# Patient Record
Sex: FEMALE | Race: WHITE | Employment: PART TIME | URBAN - NONMETROPOLITAN AREA
[De-identification: names, ages, dates, MRNs, and addresses within clinical notes are randomized per-mention and may not be internally consistent; named-entity substitution may affect disease eponyms.]

---

## 2017-01-30 ENCOUNTER — OFFICE VISIT (OUTPATIENT)
Dept: BARIATRICS/WEIGHT MGMT | Age: 27
End: 2017-01-30

## 2017-01-30 VITALS
SYSTOLIC BLOOD PRESSURE: 136 MMHG | RESPIRATION RATE: 18 BRPM | DIASTOLIC BLOOD PRESSURE: 84 MMHG | BODY MASS INDEX: 43.4 KG/M2 | HEART RATE: 96 BPM | TEMPERATURE: 99 F | WEIGHT: 293 LBS | HEIGHT: 69 IN

## 2017-01-30 DIAGNOSIS — N97.0 INFERTILITY ASSOCIATED WITH ANOVULATION: ICD-10-CM

## 2017-01-30 DIAGNOSIS — G47.30 SLEEP APNEA, UNSPECIFIED TYPE: ICD-10-CM

## 2017-01-30 DIAGNOSIS — E66.01 MORBID OBESITY DUE TO EXCESS CALORIES (HCC): Primary | ICD-10-CM

## 2017-01-30 DIAGNOSIS — J45.909 UNCOMPLICATED ASTHMA, UNSPECIFIED ASTHMA SEVERITY: ICD-10-CM

## 2017-01-30 DIAGNOSIS — F32.A DEPRESSION, UNSPECIFIED DEPRESSION TYPE: ICD-10-CM

## 2017-01-30 DIAGNOSIS — E11.9 TYPE 2 DIABETES MELLITUS WITHOUT COMPLICATION, UNSPECIFIED LONG TERM INSULIN USE STATUS: ICD-10-CM

## 2017-01-30 DIAGNOSIS — F41.9 ANXIETY: ICD-10-CM

## 2017-01-30 DIAGNOSIS — K21.9 GASTROESOPHAGEAL REFLUX DISEASE, ESOPHAGITIS PRESENCE NOT SPECIFIED: ICD-10-CM

## 2017-01-30 DIAGNOSIS — I10 ESSENTIAL HYPERTENSION: ICD-10-CM

## 2017-01-30 PROCEDURE — 99203 OFFICE O/P NEW LOW 30 MIN: CPT | Performed by: SURGERY

## 2017-01-30 RX ORDER — LABETALOL 100 MG/1
200 TABLET, FILM COATED ORAL 2 TIMES DAILY
Status: ON HOLD | COMMUNITY
Start: 2017-01-29 | End: 2018-03-06 | Stop reason: HOSPADM

## 2017-01-30 RX ORDER — VENLAFAXINE HYDROCHLORIDE 75 MG/1
75 CAPSULE, EXTENDED RELEASE ORAL DAILY
Status: ON HOLD | COMMUNITY
End: 2018-02-14 | Stop reason: HOSPADM

## 2017-01-30 RX ORDER — LEVOTHYROXINE SODIUM 0.07 MG/1
125 TABLET ORAL
COMMUNITY
Start: 2017-01-29 | End: 2018-02-05 | Stop reason: DRUGHIGH

## 2017-01-30 RX ORDER — TRAZODONE HYDROCHLORIDE 100 MG/1
TABLET ORAL
COMMUNITY
Start: 2017-01-29 | End: 2017-03-12 | Stop reason: ALTCHOICE

## 2017-01-30 RX ORDER — OMEPRAZOLE 20 MG/1
20 CAPSULE, DELAYED RELEASE ORAL DAILY
Status: ON HOLD | COMMUNITY
End: 2018-02-14 | Stop reason: HOSPADM

## 2017-01-30 RX ORDER — HYDROCHLOROTHIAZIDE 25 MG/1
TABLET ORAL
COMMUNITY
Start: 2017-01-29 | End: 2017-03-12 | Stop reason: ALTCHOICE

## 2017-01-30 ASSESSMENT — ENCOUNTER SYMPTOMS
EYES NEGATIVE: 1
DIARRHEA: 1
APNEA: 1
SHORTNESS OF BREATH: 0
CONSTIPATION: 1
BACK PAIN: 1
CHEST TIGHTNESS: 0

## 2017-02-22 ENCOUNTER — OFFICE VISIT (OUTPATIENT)
Dept: BARIATRICS/WEIGHT MGMT | Age: 27
End: 2017-02-22

## 2017-02-22 DIAGNOSIS — E66.9 OBESITY, UNSPECIFIED OBESITY SEVERITY, UNSPECIFIED OBESITY TYPE: Primary | ICD-10-CM

## 2017-02-22 DIAGNOSIS — Z13.21 MALNUTRITION SCREEN: Primary | ICD-10-CM

## 2017-02-22 DIAGNOSIS — E66.01 OBESITY, MORBID, BMI 50 OR HIGHER (HCC): ICD-10-CM

## 2017-02-22 DIAGNOSIS — Z01.818 PRE-OP TESTING: ICD-10-CM

## 2017-03-31 ENCOUNTER — OFFICE VISIT (OUTPATIENT)
Dept: BARIATRICS/WEIGHT MGMT | Age: 27
End: 2017-03-31

## 2017-03-31 VITALS
TEMPERATURE: 97.8 F | SYSTOLIC BLOOD PRESSURE: 118 MMHG | DIASTOLIC BLOOD PRESSURE: 78 MMHG | WEIGHT: 293 LBS | HEIGHT: 69 IN | BODY MASS INDEX: 43.4 KG/M2

## 2017-03-31 DIAGNOSIS — E66.01 OBESITY, MORBID, BMI 50 OR HIGHER (HCC): ICD-10-CM

## 2017-03-31 DIAGNOSIS — Z13.21 MALNUTRITION SCREEN: ICD-10-CM

## 2017-03-31 DIAGNOSIS — Z01.818 PRE-OP TESTING: ICD-10-CM

## 2017-03-31 DIAGNOSIS — E66.01 MORBID OBESITY WITH BMI OF 45.0-49.9, ADULT (HCC): Primary | ICD-10-CM

## 2017-03-31 DIAGNOSIS — E11.9 DIABETES MELLITUS TYPE II, NON INSULIN DEPENDENT (HCC): ICD-10-CM

## 2017-03-31 PROCEDURE — 99213 OFFICE O/P EST LOW 20 MIN: CPT | Performed by: PHYSICIAN ASSISTANT

## 2017-04-26 ENCOUNTER — OFFICE VISIT (OUTPATIENT)
Dept: BARIATRICS/WEIGHT MGMT | Age: 27
End: 2017-04-26

## 2017-04-26 VITALS — WEIGHT: 293 LBS | HEIGHT: 69 IN | BODY MASS INDEX: 43.4 KG/M2

## 2017-04-26 DIAGNOSIS — E66.9 OBESITY, UNSPECIFIED OBESITY SEVERITY, UNSPECIFIED OBESITY TYPE: Primary | ICD-10-CM

## 2017-04-27 ENCOUNTER — TELEPHONE (OUTPATIENT)
Age: 27
End: 2017-04-27

## 2017-05-02 ENCOUNTER — TELEPHONE (OUTPATIENT)
Age: 27
End: 2017-05-02

## 2017-05-02 DIAGNOSIS — K21.9 GASTROESOPHAGEAL REFLUX DISEASE, ESOPHAGITIS PRESENCE NOT SPECIFIED: Primary | ICD-10-CM

## 2017-05-02 DIAGNOSIS — E66.01 MORBID OBESITY DUE TO EXCESS CALORIES (HCC): ICD-10-CM

## 2017-05-10 ENCOUNTER — OFFICE VISIT (OUTPATIENT)
Dept: BARIATRICS/WEIGHT MGMT | Age: 27
End: 2017-05-10

## 2017-05-10 VITALS
SYSTOLIC BLOOD PRESSURE: 132 MMHG | TEMPERATURE: 97.8 F | HEIGHT: 69 IN | WEIGHT: 293 LBS | BODY MASS INDEX: 43.4 KG/M2 | HEART RATE: 74 BPM | DIASTOLIC BLOOD PRESSURE: 82 MMHG

## 2017-05-10 DIAGNOSIS — E66.9 OBESITY, UNSPECIFIED OBESITY SEVERITY, UNSPECIFIED OBESITY TYPE: Primary | ICD-10-CM

## 2017-05-10 DIAGNOSIS — E11.9 DIABETES MELLITUS TYPE II, NON INSULIN DEPENDENT (HCC): ICD-10-CM

## 2017-05-10 DIAGNOSIS — E66.01 MORBID OBESITY WITH BMI OF 45.0-49.9, ADULT (HCC): Primary | ICD-10-CM

## 2017-05-10 DIAGNOSIS — K21.9 GASTROESOPHAGEAL REFLUX DISEASE, ESOPHAGITIS PRESENCE NOT SPECIFIED: ICD-10-CM

## 2017-05-10 PROCEDURE — 99213 OFFICE O/P EST LOW 20 MIN: CPT | Performed by: PHYSICIAN ASSISTANT

## 2017-06-12 ENCOUNTER — OFFICE VISIT (OUTPATIENT)
Dept: BARIATRICS/WEIGHT MGMT | Age: 27
End: 2017-06-12

## 2017-06-12 VITALS
BODY MASS INDEX: 43.4 KG/M2 | SYSTOLIC BLOOD PRESSURE: 134 MMHG | HEIGHT: 69 IN | DIASTOLIC BLOOD PRESSURE: 78 MMHG | WEIGHT: 293 LBS | HEART RATE: 76 BPM | TEMPERATURE: 98.2 F

## 2017-06-12 VITALS — HEIGHT: 69 IN | WEIGHT: 293 LBS | BODY MASS INDEX: 43.4 KG/M2

## 2017-06-12 DIAGNOSIS — E66.9 OBESITY, UNSPECIFIED OBESITY SEVERITY, UNSPECIFIED OBESITY TYPE: Primary | ICD-10-CM

## 2017-06-12 DIAGNOSIS — E11.9 DIABETES MELLITUS TYPE II, NON INSULIN DEPENDENT (HCC): ICD-10-CM

## 2017-06-12 DIAGNOSIS — K21.9 GASTROESOPHAGEAL REFLUX DISEASE, ESOPHAGITIS PRESENCE NOT SPECIFIED: ICD-10-CM

## 2017-06-12 DIAGNOSIS — I10 ESSENTIAL HYPERTENSION: ICD-10-CM

## 2017-06-12 DIAGNOSIS — E66.01 MORBID OBESITY WITH BMI OF 45.0-49.9, ADULT (HCC): Primary | ICD-10-CM

## 2017-06-12 PROCEDURE — 99213 OFFICE O/P EST LOW 20 MIN: CPT | Performed by: PHYSICIAN ASSISTANT

## 2017-07-19 ENCOUNTER — HOSPITAL ENCOUNTER (EMERGENCY)
Age: 27
Discharge: HOME | End: 2017-07-20
Attending: FAMILY MEDICINE
Payer: COMMERCIAL

## 2017-07-19 DIAGNOSIS — M25.511 ACUTE PAIN OF RIGHT SHOULDER: Primary | ICD-10-CM

## 2017-07-19 PROCEDURE — 99283 EMERGENCY DEPT VISIT LOW MDM: CPT

## 2017-07-19 ASSESSMENT — PAIN DESCRIPTION - ONSET: ONSET: ON-GOING

## 2017-07-19 ASSESSMENT — PAIN DESCRIPTION - DIRECTION: RADIATING_TOWARDS: DOWN RIGHT ARM

## 2017-07-19 ASSESSMENT — PAIN DESCRIPTION - FREQUENCY: FREQUENCY: CONTINUOUS

## 2017-07-19 ASSESSMENT — PAIN DESCRIPTION - LOCATION: LOCATION: SHOULDER

## 2017-07-19 ASSESSMENT — PAIN DESCRIPTION - ORIENTATION: ORIENTATION: RIGHT

## 2017-07-19 ASSESSMENT — PAIN SCALES - GENERAL: PAINLEVEL_OUTOF10: 10

## 2017-07-19 ASSESSMENT — PAIN DESCRIPTION - PROGRESSION: CLINICAL_PROGRESSION: GRADUALLY WORSENING

## 2017-07-19 ASSESSMENT — PAIN DESCRIPTION - DESCRIPTORS: DESCRIPTORS: ACHING;DULL

## 2017-07-20 ENCOUNTER — APPOINTMENT (OUTPATIENT)
Dept: GENERAL RADIOLOGY | Age: 27
End: 2017-07-20
Payer: COMMERCIAL

## 2017-07-20 VITALS
DIASTOLIC BLOOD PRESSURE: 68 MMHG | SYSTOLIC BLOOD PRESSURE: 132 MMHG | RESPIRATION RATE: 18 BRPM | OXYGEN SATURATION: 100 % | BODY MASS INDEX: 41.95 KG/M2 | WEIGHT: 293 LBS | TEMPERATURE: 98 F | HEART RATE: 70 BPM | HEIGHT: 70 IN

## 2017-07-20 PROCEDURE — 73030 X-RAY EXAM OF SHOULDER: CPT

## 2017-07-20 PROCEDURE — 6370000000 HC RX 637 (ALT 250 FOR IP): Performed by: FAMILY MEDICINE

## 2017-07-20 RX ORDER — IBUPROFEN 800 MG/1
800 TABLET ORAL ONCE
Status: COMPLETED | OUTPATIENT
Start: 2017-07-20 | End: 2017-07-20

## 2017-07-20 RX ORDER — CYCLOBENZAPRINE HCL 10 MG
10 TABLET ORAL 3 TIMES DAILY PRN
Qty: 30 TABLET | Refills: 0 | Status: SHIPPED | OUTPATIENT
Start: 2017-07-20 | End: 2017-07-30

## 2017-07-20 RX ORDER — NAPROXEN 500 MG/1
500 TABLET ORAL 2 TIMES DAILY
Qty: 60 TABLET | Refills: 0 | Status: SHIPPED | OUTPATIENT
Start: 2017-07-20 | End: 2017-08-22

## 2017-07-20 RX ADMIN — IBUPROFEN 800 MG: 800 TABLET, FILM COATED ORAL at 00:34

## 2017-07-20 ASSESSMENT — ENCOUNTER SYMPTOMS
DIARRHEA: 0
BACK PAIN: 0
RHINORRHEA: 0
EYE DISCHARGE: 0
SHORTNESS OF BREATH: 0
EYE PAIN: 0
SORE THROAT: 0
ABDOMINAL PAIN: 0
NAUSEA: 0
COUGH: 0
VOMITING: 0
WHEEZING: 0

## 2017-07-20 ASSESSMENT — PAIN SCALES - GENERAL
PAINLEVEL_OUTOF10: 10
PAINLEVEL_OUTOF10: 10

## 2017-07-20 ASSESSMENT — PAIN DESCRIPTION - ORIENTATION: ORIENTATION: RIGHT

## 2017-07-20 ASSESSMENT — PAIN DESCRIPTION - LOCATION: LOCATION: SHOULDER

## 2017-07-20 ASSESSMENT — PAIN DESCRIPTION - PAIN TYPE: TYPE: CHRONIC PAIN

## 2017-07-24 ENCOUNTER — OFFICE VISIT (OUTPATIENT)
Dept: BARIATRICS/WEIGHT MGMT | Age: 27
End: 2017-07-24

## 2017-07-24 ENCOUNTER — OFFICE VISIT (OUTPATIENT)
Dept: BARIATRICS/WEIGHT MGMT | Age: 27
End: 2017-07-24
Payer: COMMERCIAL

## 2017-07-24 VITALS
RESPIRATION RATE: 18 BRPM | DIASTOLIC BLOOD PRESSURE: 78 MMHG | TEMPERATURE: 98.9 F | BODY MASS INDEX: 43.4 KG/M2 | SYSTOLIC BLOOD PRESSURE: 136 MMHG | HEIGHT: 69 IN | WEIGHT: 293 LBS | HEART RATE: 88 BPM

## 2017-07-24 VITALS — WEIGHT: 293 LBS | BODY MASS INDEX: 43.4 KG/M2 | HEIGHT: 69 IN

## 2017-07-24 DIAGNOSIS — E66.01 MORBID OBESITY WITH BMI OF 50.0-59.9, ADULT (HCC): Primary | ICD-10-CM

## 2017-07-24 DIAGNOSIS — K21.9 GASTROESOPHAGEAL REFLUX DISEASE, ESOPHAGITIS PRESENCE NOT SPECIFIED: ICD-10-CM

## 2017-07-24 DIAGNOSIS — E66.01 MORBID OBESITY, UNSPECIFIED OBESITY TYPE (HCC): Primary | ICD-10-CM

## 2017-07-24 DIAGNOSIS — I10 ESSENTIAL HYPERTENSION: ICD-10-CM

## 2017-07-24 DIAGNOSIS — E11.9 DIABETES MELLITUS TYPE II, NON INSULIN DEPENDENT (HCC): ICD-10-CM

## 2017-07-24 PROCEDURE — 99213 OFFICE O/P EST LOW 20 MIN: CPT | Performed by: PHYSICIAN ASSISTANT

## 2017-08-21 ENCOUNTER — HOSPITAL ENCOUNTER (OUTPATIENT)
Age: 27
Discharge: HOME | End: 2017-08-21
Payer: COMMERCIAL

## 2017-08-21 PROCEDURE — 93005 ELECTROCARDIOGRAM TRACING: CPT

## 2017-08-22 ENCOUNTER — OFFICE VISIT (OUTPATIENT)
Dept: BARIATRICS/WEIGHT MGMT | Age: 27
End: 2017-08-22
Payer: COMMERCIAL

## 2017-08-22 ENCOUNTER — OFFICE VISIT (OUTPATIENT)
Dept: BARIATRICS/WEIGHT MGMT | Age: 27
End: 2017-08-22

## 2017-08-22 VITALS
HEIGHT: 69 IN | SYSTOLIC BLOOD PRESSURE: 126 MMHG | TEMPERATURE: 98.9 F | WEIGHT: 293 LBS | RESPIRATION RATE: 18 BRPM | HEART RATE: 84 BPM | BODY MASS INDEX: 43.4 KG/M2 | DIASTOLIC BLOOD PRESSURE: 80 MMHG

## 2017-08-22 DIAGNOSIS — K21.9 GASTROESOPHAGEAL REFLUX DISEASE, ESOPHAGITIS PRESENCE NOT SPECIFIED: ICD-10-CM

## 2017-08-22 DIAGNOSIS — G25.81 RLS (RESTLESS LEGS SYNDROME): ICD-10-CM

## 2017-08-22 DIAGNOSIS — Z91.89 AT RISK FOR SLEEP APNEA: ICD-10-CM

## 2017-08-22 DIAGNOSIS — E66.01 MORBID OBESITY WITH BMI OF 50.0-59.9, ADULT (HCC): Primary | ICD-10-CM

## 2017-08-22 DIAGNOSIS — E66.01 MORBID OBESITY, UNSPECIFIED OBESITY TYPE (HCC): Primary | ICD-10-CM

## 2017-08-22 DIAGNOSIS — I10 ESSENTIAL HYPERTENSION: ICD-10-CM

## 2017-08-22 DIAGNOSIS — E11.9 DIABETES MELLITUS TYPE II, NON INSULIN DEPENDENT (HCC): ICD-10-CM

## 2017-08-22 LAB
EKG ATRIAL RATE: 70 BPM
EKG P AXIS: 29 DEGREES
EKG P-R INTERVAL: 180 MS
EKG Q-T INTERVAL: 366 MS
EKG QRS DURATION: 88 MS
EKG QTC CALCULATION (BAZETT): 395 MS
EKG R AXIS: 86 DEGREES
EKG T AXIS: 35 DEGREES
EKG VENTRICULAR RATE: 70 BPM

## 2017-08-22 PROCEDURE — 99213 OFFICE O/P EST LOW 20 MIN: CPT | Performed by: PHYSICIAN ASSISTANT

## 2017-09-01 ENCOUNTER — NURSE TRIAGE (OUTPATIENT)
Dept: ADMINISTRATIVE | Age: 27
End: 2017-09-01

## 2017-11-06 ENCOUNTER — OFFICE VISIT (OUTPATIENT)
Dept: BARIATRICS/WEIGHT MGMT | Age: 27
End: 2017-11-06

## 2017-11-06 ENCOUNTER — OFFICE VISIT (OUTPATIENT)
Dept: BARIATRICS/WEIGHT MGMT | Age: 27
End: 2017-11-06
Payer: COMMERCIAL

## 2017-11-06 VITALS
HEART RATE: 96 BPM | TEMPERATURE: 98.4 F | DIASTOLIC BLOOD PRESSURE: 84 MMHG | HEIGHT: 69 IN | BODY MASS INDEX: 43.4 KG/M2 | WEIGHT: 293 LBS | SYSTOLIC BLOOD PRESSURE: 132 MMHG | RESPIRATION RATE: 18 BRPM

## 2017-11-06 DIAGNOSIS — E11.9 DIABETES MELLITUS TYPE II, NON INSULIN DEPENDENT (HCC): ICD-10-CM

## 2017-11-06 DIAGNOSIS — I10 ESSENTIAL HYPERTENSION: ICD-10-CM

## 2017-11-06 DIAGNOSIS — E03.9 HYPOTHYROIDISM, UNSPECIFIED TYPE: ICD-10-CM

## 2017-11-06 DIAGNOSIS — G47.30 SLEEP APNEA, UNSPECIFIED TYPE: ICD-10-CM

## 2017-11-06 DIAGNOSIS — E66.01 MORBID OBESITY WITH BMI OF 50.0-59.9, ADULT (HCC): Primary | ICD-10-CM

## 2017-11-06 DIAGNOSIS — E66.01 MORBID OBESITY DUE TO EXCESS CALORIES (HCC): Primary | ICD-10-CM

## 2017-11-06 DIAGNOSIS — K21.9 GASTROESOPHAGEAL REFLUX DISEASE, ESOPHAGITIS PRESENCE NOT SPECIFIED: ICD-10-CM

## 2017-11-06 PROCEDURE — 3046F HEMOGLOBIN A1C LEVEL >9.0%: CPT | Performed by: PHYSICIAN ASSISTANT

## 2017-11-06 PROCEDURE — G8427 DOCREV CUR MEDS BY ELIG CLIN: HCPCS | Performed by: PHYSICIAN ASSISTANT

## 2017-11-06 PROCEDURE — G8484 FLU IMMUNIZE NO ADMIN: HCPCS | Performed by: PHYSICIAN ASSISTANT

## 2017-11-06 PROCEDURE — 99213 OFFICE O/P EST LOW 20 MIN: CPT | Performed by: PHYSICIAN ASSISTANT

## 2017-11-06 PROCEDURE — 1036F TOBACCO NON-USER: CPT | Performed by: PHYSICIAN ASSISTANT

## 2017-11-06 PROCEDURE — G8417 CALC BMI ABV UP PARAM F/U: HCPCS | Performed by: PHYSICIAN ASSISTANT

## 2017-11-06 NOTE — PATIENT INSTRUCTIONS
Goals: 1. I will increase my physical activity by using stationary bike at home at least two days a week! 2. I will begin using food journal again to record meal times, serving sizes and bring back to next dietitian visit. 3.  Great job on water intake - continue at least 64 oz a day  4. Remember important not to skip meals!

## 2017-11-06 NOTE — PROGRESS NOTES
Occupational 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     Social History Main Topics    Smoking status: Former Smoker     Packs/day: 0.50     Types: Cigarettes     Quit date: 5/18/2014    Smokeless tobacco: Never Used    Alcohol use No    Drug use: No    Sexual activity: Yes     Partners: Male     Other Topics Concern    Not on file     Social History Narrative    No narrative on file        Medications:   Current Outpatient Prescriptions   Medication Sig Dispense Refill    Norgestimate-Eth Estradiol (SPRINTEC 28 PO) Take by mouth      metFORMIN (GLUCOPHAGE) 500 MG tablet Take 500 mg by mouth 4 times daily 2 tablets in the morning, then 2 tablets at night      Calcium Carbonate-Vitamin D (OS-ADIN 500 + D PO) Take by mouth 2 times daily      omeprazole (PRILOSEC) 20 MG delayed release capsule Take 20 mg by mouth daily      venlafaxine (EFFEXOR XR) 75 MG extended release capsule Take 75 mg by mouth daily      levothyroxine (SYNTHROID) 75 MCG tablet 100 mcg       labetalol (NORMODYNE) 100 MG tablet 1.5 pills in morning, then one pill at night.  Albuterol Sulfate (PROVENTIL HFA IN) Inhale into the lungs      EPINEPHrine HCl, Anaphylaxis, (EPIPEN IM) Inject into the muscle       No current facility-administered medications for this visit. Allergies: Allergies   Allergen Reactions    Bee Venom Swelling    Bactrim [Sulfamethoxazole-Trimethoprim]     Mirapex [Pramipexole] Other (See Comments)     Leg cramps    Requip [Ropinirole] Nausea And Vomiting       Subjective:    Review of Systems:  Constitutional: Denies any fever, chills. (+) fatigue. Wound: Denies any rash, skin color changes or wound problems. Resp: Denies any cough, shortness of breath. CV: Denies any chest pain, orthopnea or syncope. GI: Denies any nausea, vomiting. intermittent diarrhea and constipation a/w IBS   : Denies any hematuria, hesitancy or dysuria.      Objective:      /84 (Site: Right Arm, Position: Sitting, Cuff Size: Large Adult)   Pulse 96   Temp 98.4 °F (36.9 °C) (Oral)   Resp 18   Ht 5' 9\" (1.753 m)   Wt (!) 341 lb 6.4 oz (154.9 kg)   BMI 50.42 kg/m²   Physical Examination:   Constitutional: Alert and oriented to person, place and time. Well-developed, well- nourished. Head: Normocephalic and atraumatic  Neck: Supple. Eyes: EOMI b/l. Conjunctivae normal.  No scleral icterus. Respiratory: Effort normal. No respiratory distress. Abd: Benign  Ext:  Movement x 4. No edema  Skin; Warm and dry, no visible rashes, lesions or ulcers. Neuro: Cranial Nerves Grossly Intact; nml coordination  Assessment:      1. Morbid obesity with BMI of 50.0-59.9, adult ([facility] [address]     2. Gastroesophageal reflux disease, esophagitis presence not specified     3. Diabetes mellitus type II, non insulin dependent ([facility] [address]     4. Essential hypertension     5. Sleep apnea, unspecified type     6. Hypothyroidism, unspecified type         Plan:    · Behavior modification discussed in detail in regards to dietary habits. · Nutritional education occurred during visit. Continue following recommendations per dietician  · Improvement in fitness/exercise discussed with patient and the need for this with/without surgery. · Completed orientation with Stuart Segovia, Exercise Physiologist, at the fitness center. · Encouraged to get back on track with dedicated physical activity and proper nutrition  · Completed EGD- wnl/ H. Pylori negative   · Encouraged support groups  · Has attended 3 support groups. · Completed sleep study- mild YOANDY- advised dental device. · Psych eval completed  · LOMN recieved  Return in about 1 month (around 12/6/2017) for Follow up. I spent over 15 minutes with the patient, with greater that 50% of that time spent on education, counseling and coordination of care.      Electronically signed by NYDIA Matta on 11/6/2017 at 1:38 PM

## 2017-11-06 NOTE — PROGRESS NOTES
food.   Snacks: limited snacking. Main Beverages: has reduced tea with splenda, and states water intake has increased and hitting 64 oz per day and greater now  -Impression of Dietary Intake: skipping meals, increased fast food. - Pt  is not  avoiding high fat/high sugar foods. Pt  is not  including protein at meals and snacks. Exercise:  Patient has attended Fitness Orientation  -Physical Activity is:  Does have stationary bike at home and is open to start using this. Little exercise over last couple months       Nutrition Diagnosis: Overweight/Obesity related to currently undergoing MNT as evidenced by BMI of 50.4    Intervention:   Patient has attended support group in the past and encouraged patient to start this again. Pt has been less focused on nutrition behavior changes over last couple months and recognizes this. Reminded patient post surgery implementing non food coping mechanisms in times of stress will be important to keep with weight loss efforts. New goals set with patient including importance of not skipping meals. Handouts given: Food Journal    Patient Instructions   Goals:  1. I will increase my physical activity by using stationary bike at home at least two days a week! 2. I will begin using food journal again to record meal times, serving sizes and bring back to next dietitian visit. 3.  Great job on water intake - continue at least 64 oz a day  4. Remember important not to skip meals!       -Followup visit: 4 weeks with dietitian.      Total time involved in direct patient education: 30 minutes    aMris Coughlin RD, LD  Dietitian- [facility]

## 2017-12-04 ENCOUNTER — OFFICE VISIT (OUTPATIENT)
Dept: BARIATRICS/WEIGHT MGMT | Age: 27
End: 2017-12-04

## 2017-12-04 ENCOUNTER — OFFICE VISIT (OUTPATIENT)
Dept: BARIATRICS/WEIGHT MGMT | Age: 27
End: 2017-12-04
Payer: COMMERCIAL

## 2017-12-04 VITALS
WEIGHT: 293 LBS | BODY MASS INDEX: 43.4 KG/M2 | RESPIRATION RATE: 18 BRPM | HEART RATE: 80 BPM | SYSTOLIC BLOOD PRESSURE: 124 MMHG | HEIGHT: 69 IN | TEMPERATURE: 98.3 F | DIASTOLIC BLOOD PRESSURE: 82 MMHG

## 2017-12-04 DIAGNOSIS — I10 ESSENTIAL HYPERTENSION: ICD-10-CM

## 2017-12-04 DIAGNOSIS — K21.9 GASTROESOPHAGEAL REFLUX DISEASE, ESOPHAGITIS PRESENCE NOT SPECIFIED: ICD-10-CM

## 2017-12-04 DIAGNOSIS — E03.9 HYPOTHYROIDISM, UNSPECIFIED TYPE: ICD-10-CM

## 2017-12-04 DIAGNOSIS — E66.01 MORBID OBESITY DUE TO EXCESS CALORIES (HCC): Primary | ICD-10-CM

## 2017-12-04 DIAGNOSIS — E66.01 MORBID OBESITY WITH BMI OF 50.0-59.9, ADULT (HCC): Primary | ICD-10-CM

## 2017-12-04 DIAGNOSIS — G47.30 SLEEP APNEA, UNSPECIFIED TYPE: ICD-10-CM

## 2017-12-04 DIAGNOSIS — E11.9 DIABETES MELLITUS TYPE II, NON INSULIN DEPENDENT (HCC): ICD-10-CM

## 2017-12-04 PROCEDURE — G8417 CALC BMI ABV UP PARAM F/U: HCPCS | Performed by: PHYSICIAN ASSISTANT

## 2017-12-04 PROCEDURE — G8427 DOCREV CUR MEDS BY ELIG CLIN: HCPCS | Performed by: PHYSICIAN ASSISTANT

## 2017-12-04 PROCEDURE — G8484 FLU IMMUNIZE NO ADMIN: HCPCS | Performed by: PHYSICIAN ASSISTANT

## 2017-12-04 PROCEDURE — 99213 OFFICE O/P EST LOW 20 MIN: CPT | Performed by: PHYSICIAN ASSISTANT

## 2017-12-04 PROCEDURE — 3046F HEMOGLOBIN A1C LEVEL >9.0%: CPT | Performed by: PHYSICIAN ASSISTANT

## 2017-12-04 PROCEDURE — 1036F TOBACCO NON-USER: CPT | Performed by: PHYSICIAN ASSISTANT

## 2017-12-04 NOTE — PROGRESS NOTES
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[employer]     Social History Main Topics    Smoking status: Former Smoker     Packs/day: 0.50     Types: Cigarettes     Quit date: 5/18/2014    Smokeless tobacco: Never Used    Alcohol use No    Drug use: No    Sexual activity: Yes     Partners: Male     Other Topics Concern    Not on file     Social History Narrative    No narrative on file        Medications:   Current Outpatient Prescriptions   Medication Sig Dispense Refill    aspirin 81 MG tablet Take 81 mg by mouth daily      Norgestimate-Eth Estradiol (SPRINTEC 28 PO) Take by mouth      metFORMIN (GLUCOPHAGE) 500 MG tablet Take 500 mg by mouth 4 times daily 2 tablets in the morning, then 2 tablets at night      Calcium Carbonate-Vitamin D (OS-ADIN 500 + D PO) Take by mouth 2 times daily      omeprazole (PRILOSEC) 20 MG delayed release capsule Take 20 mg by mouth daily      venlafaxine (EFFEXOR XR) 75 MG extended release capsule Take 75 mg by mouth daily      levothyroxine (SYNTHROID) 75 MCG tablet 125 mcg       labetalol (NORMODYNE) 100 MG tablet 200 mg 2 times daily 1.5 pills in morning, then one pill at night.  Albuterol Sulfate (PROVENTIL HFA IN) Inhale into the lungs      EPINEPHrine HCl, Anaphylaxis, (EPIPEN IM) Inject into the muscle       No current facility-administered medications for this visit. Allergies: Allergies   Allergen Reactions    Bee Venom Swelling    Bactrim [Sulfamethoxazole-Trimethoprim]     Mirapex [Pramipexole] Other (See Comments)     Leg cramps    Requip [Ropinirole] Nausea And Vomiting       Subjective:    Review of Systems:  Constitutional: Denies any fever, chills. (+) fatigue. Wound: Denies any rash, skin color changes or wound problems. Resp: Denies any cough, shortness of breath. CV: Denies any chest pain, orthopnea or syncope. GI: Denies any nausea, vomiting. intermittent diarrhea and constipation a/w IBS   : Denies any hematuria, hesitancy or dysuria.

## 2017-12-04 NOTE — PROGRESS NOTES
bottles or greater of water  -Impression of Dietary Intake: irregular meal times, skips meals. - Pt  is not consistently avoiding high fat/high sugar foods. Pt  is not consistently including protein at meals and snacks. Exercise:  Patient has attended Fitness Orientation  -Physical Activity is:  Does have stationary bike at home and this month states \"tries\" to use it ~ 2-3 times a week       Nutrition Diagnosis: Overweight/Obesity related to currently undergoing MNT as evidenced by BMI of 50.7    Intervention:   Patient has attended support group in past and strongly encouraged to begin attending again for reinforcement of lifestyle change. Has had some difficulty with consistently following nutrition behavior changes recommended for weight loss success post op. Reminded patient importance of not skipping meals and choosing protein foods first.  Reviwed list of foods to have on hand following surgery. Strategies discussed with split working shift and not going longer than 4-6 waking hours between meals. Suggested bringing protein shake to work. Benefits of regular physical activity reviewed. -  Patient Instructions   Goals:  1. Next support group is Tuesday December 12th at 5:30pm  2. Consistent meal times is important after surgery- can NOT skip meals. Try not to go longer than 4-6 waking hours between meals and remember order of how you want to eat- choose lean protein foods first, followed by vegetables and fruit, then starch food last if still hungry  3. I will aim for regular physical activity- at least three days a week on exercise bike.        -Followup visit: 4 weeks with dietitian and with Dr Justin Bustos next month for re-evaluation following completion of 9 months    Total time involved in direct patient education: 30 minutes    Alex Weldon, JAIRO, LD  Dietitian- [facility]

## 2017-12-04 NOTE — PATIENT INSTRUCTIONS
Goals: 1. Next support group is Tuesday December 12th at 5:30pm  2. Consistent meal times is important after surgery- can NOT skip meals. Try not to go longer than 4-6 waking hours between meals and remember order of how you want to eat- choose lean protein foods first, followed by vegetables and fruit, then starch food last if still hungry  3. I will aim for regular physical activity- at least three days a week on exercise bike.

## 2018-01-09 ENCOUNTER — OFFICE VISIT (OUTPATIENT)
Dept: BARIATRICS/WEIGHT MGMT | Age: 28
End: 2018-01-09

## 2018-01-09 VITALS — BODY MASS INDEX: 43.4 KG/M2 | HEIGHT: 69 IN | WEIGHT: 293 LBS

## 2018-01-09 DIAGNOSIS — E66.01 MORBID OBESITY DUE TO EXCESS CALORIES (HCC): Primary | ICD-10-CM

## 2018-01-09 NOTE — PROGRESS NOTES
of Dietary Intake: on average, 3 meals per day. - Pt  is   avoiding high fat/high sugar foods. Pt  is   including protein at meals and snacks. Exercise:  Patient has attended Fitness Orientation  -Physical Activity is:  Using exercise bike at homefor ~ 15-20 minutes    Frequency of Activity:  2-3 times a week         Nutrition Diagnosis: Overweight/Obesity related to currently undergoing MNT as evidenced by  Body mass index is 51.1 kg/m². .    Intervention:   Healthy behaviors: increase physical activity, not skipping meals and adequate fluid intake  Patient has attended support group times two. Pt advised to eliminate all carbonated beverages. Reviewed out of pocket cost for bariatric vitamins and protein powder. Samples of vitamins given to patient. Questions answered. Handouts given: Support Group and Nutrition Class Schedule, Bariatric Vitamins/Protein Cost and Recommendations and Vitamin Samples, Monthly Recipe    -  Patient Instructions   Goals:  1. Have office note [facility]ed [facility] from your pulmonologist in [facility] that you are cleared for bariatric surgery. 2.  Remember you will need to separate you liquids from solids after surgery. No liquids 30 minutes before your meals and no liquids 30 minutes after your meals. 3. You will need to take your time with meals after surgery and begin practicing this now - chew foods really well and take 20-30 minutes at each meal.  4.  Aim for consistent and set meal times- choose lean protein foods first, followed by vegetables and fruit, then starch food last if still hungry. Consistency is key following bariatric surgery. 5.  Continue regular physical activity- recommend stay as active as you can leading up to surgery and after surgery this will remain important for your weight loss efforts           -Followup visit: *completed 9 months of medical supervision with RD. Meeting with Dr Charlyn Bumpers this Friday.   To attend pre op teaching class upon insurance approval.     Total time involved in direct patient education: 30 minutes    Mercedes Mckeon RD, LD  Dietitian- [facility]

## 2018-01-11 DIAGNOSIS — K21.9 GASTROESOPHAGEAL REFLUX DISEASE WITHOUT ESOPHAGITIS: ICD-10-CM

## 2018-01-11 DIAGNOSIS — E66.01 MORBID OBESITY WITH BMI OF 45.0-49.9, ADULT (HCC): Primary | ICD-10-CM

## 2018-01-11 DIAGNOSIS — I10 HYPERTENSION, UNSPECIFIED TYPE: ICD-10-CM

## 2018-01-11 DIAGNOSIS — E11.8 TYPE 2 DIABETES MELLITUS WITH COMPLICATION, WITHOUT LONG-TERM CURRENT USE OF INSULIN (HCC): ICD-10-CM

## 2018-01-12 ENCOUNTER — OFFICE VISIT (OUTPATIENT)
Dept: BARIATRICS/WEIGHT MGMT | Age: 28
End: 2018-01-12
Payer: COMMERCIAL

## 2018-01-12 ENCOUNTER — HOSPITAL ENCOUNTER (OUTPATIENT)
Dept: GENERAL RADIOLOGY | Age: 28
Discharge: HOME | End: 2018-01-12
Payer: COMMERCIAL

## 2018-01-12 ENCOUNTER — HOSPITAL ENCOUNTER (OUTPATIENT)
Age: 28
Discharge: HOME | End: 2018-01-12
Payer: COMMERCIAL

## 2018-01-12 VITALS
DIASTOLIC BLOOD PRESSURE: 82 MMHG | HEART RATE: 88 BPM | HEIGHT: 69 IN | BODY MASS INDEX: 43.4 KG/M2 | SYSTOLIC BLOOD PRESSURE: 118 MMHG | RESPIRATION RATE: 18 BRPM | WEIGHT: 293 LBS | TEMPERATURE: 97.8 F

## 2018-01-12 DIAGNOSIS — I10 HYPERTENSION, UNSPECIFIED TYPE: ICD-10-CM

## 2018-01-12 DIAGNOSIS — J45.909 UNCOMPLICATED ASTHMA, UNSPECIFIED ASTHMA SEVERITY, UNSPECIFIED WHETHER PERSISTENT: ICD-10-CM

## 2018-01-12 DIAGNOSIS — E11.8 TYPE 2 DIABETES MELLITUS WITH COMPLICATION, WITHOUT LONG-TERM CURRENT USE OF INSULIN (HCC): ICD-10-CM

## 2018-01-12 DIAGNOSIS — K21.9 GASTROESOPHAGEAL REFLUX DISEASE WITHOUT ESOPHAGITIS: ICD-10-CM

## 2018-01-12 DIAGNOSIS — G47.30 SLEEP APNEA, UNSPECIFIED TYPE: ICD-10-CM

## 2018-01-12 DIAGNOSIS — F32.A DEPRESSION, UNSPECIFIED DEPRESSION TYPE: ICD-10-CM

## 2018-01-12 DIAGNOSIS — N97.0 INFERTILITY ASSOCIATED WITH ANOVULATION: ICD-10-CM

## 2018-01-12 DIAGNOSIS — E66.01 MORBID OBESITY WITH BMI OF 45.0-49.9, ADULT (HCC): ICD-10-CM

## 2018-01-12 DIAGNOSIS — G89.29 CHRONIC LOW BACK PAIN WITHOUT SCIATICA, UNSPECIFIED BACK PAIN LATERALITY: ICD-10-CM

## 2018-01-12 DIAGNOSIS — E66.01 MORBID OBESITY WITH BMI OF 45.0-49.9, ADULT (HCC): Primary | ICD-10-CM

## 2018-01-12 DIAGNOSIS — G25.81 RLS (RESTLESS LEGS SYNDROME): ICD-10-CM

## 2018-01-12 DIAGNOSIS — F41.9 ANXIETY: ICD-10-CM

## 2018-01-12 DIAGNOSIS — M54.50 CHRONIC LOW BACK PAIN WITHOUT SCIATICA, UNSPECIFIED BACK PAIN LATERALITY: ICD-10-CM

## 2018-01-12 DIAGNOSIS — E03.9 HYPOTHYROIDISM, UNSPECIFIED TYPE: ICD-10-CM

## 2018-01-12 LAB
ANION GAP SERPL CALCULATED.3IONS-SCNC: 17 MEQ/L (ref 8–16)
BASOPHILS # BLD: 0.9 %
BASOPHILS ABSOLUTE: 0.1 THOU/MM3 (ref 0–0.1)
BUN BLDV-MCNC: 9 MG/DL (ref 7–22)
CALCIUM SERPL-MCNC: 8.9 MG/DL (ref 8.5–10.5)
CHLORIDE BLD-SCNC: 101 MEQ/L (ref 98–111)
CO2: 24 MEQ/L (ref 23–33)
CREAT SERPL-MCNC: 0.7 MG/DL (ref 0.4–1.2)
EOSINOPHIL # BLD: 2.4 %
EOSINOPHILS ABSOLUTE: 0.2 THOU/MM3 (ref 0–0.4)
GFR SERPL CREATININE-BSD FRML MDRD: > 90 ML/MIN/1.73M2
GLUCOSE BLD-MCNC: 134 MG/DL (ref 70–108)
HCT VFR BLD CALC: 38 % (ref 37–47)
HEMOGLOBIN: 12.8 GM/DL (ref 12–16)
LYMPHOCYTES # BLD: 18.6 %
LYMPHOCYTES ABSOLUTE: 1.4 THOU/MM3 (ref 1–4.8)
MCH RBC QN AUTO: 31 PG (ref 27–31)
MCHC RBC AUTO-ENTMCNC: 33.7 GM/DL (ref 33–37)
MCV RBC AUTO: 92.1 FL (ref 81–99)
MONOCYTES # BLD: 9.9 %
MONOCYTES ABSOLUTE: 0.7 THOU/MM3 (ref 0.4–1.3)
NUCLEATED RED BLOOD CELLS: 0 /100 WBC
PDW BLD-RTO: 13.8 % (ref 11.5–14.5)
PLATELET # BLD: 192 THOU/MM3 (ref 130–400)
PMV BLD AUTO: 11.2 MCM (ref 7.4–10.4)
POTASSIUM SERPL-SCNC: 4.1 MEQ/L (ref 3.5–5.2)
RBC # BLD: 4.13 MILL/MM3 (ref 4.2–5.4)
SEG NEUTROPHILS: 68.2 %
SEGMENTED NEUTROPHILS ABSOLUTE COUNT: 5 THOU/MM3 (ref 1.8–7.7)
SODIUM BLD-SCNC: 142 MEQ/L (ref 135–145)
WBC # BLD: 7.3 THOU/MM3 (ref 4.8–10.8)

## 2018-01-12 PROCEDURE — G8484 FLU IMMUNIZE NO ADMIN: HCPCS | Performed by: SURGERY

## 2018-01-12 PROCEDURE — 3046F HEMOGLOBIN A1C LEVEL >9.0%: CPT | Performed by: SURGERY

## 2018-01-12 PROCEDURE — 80048 BASIC METABOLIC PNL TOTAL CA: CPT

## 2018-01-12 PROCEDURE — 1036F TOBACCO NON-USER: CPT | Performed by: SURGERY

## 2018-01-12 PROCEDURE — G8417 CALC BMI ABV UP PARAM F/U: HCPCS | Performed by: SURGERY

## 2018-01-12 PROCEDURE — 71046 X-RAY EXAM CHEST 2 VIEWS: CPT

## 2018-01-12 PROCEDURE — 85025 COMPLETE CBC W/AUTO DIFF WBC: CPT

## 2018-01-12 PROCEDURE — 99213 OFFICE O/P EST LOW 20 MIN: CPT | Performed by: SURGERY

## 2018-01-12 PROCEDURE — 36415 COLL VENOUS BLD VENIPUNCTURE: CPT

## 2018-01-12 PROCEDURE — G8427 DOCREV CUR MEDS BY ELIG CLIN: HCPCS | Performed by: SURGERY

## 2018-01-12 ASSESSMENT — ENCOUNTER SYMPTOMS
APNEA: 1
ALLERGIC/IMMUNOLOGIC NEGATIVE: 1
WHEEZING: 1
GASTROINTESTINAL NEGATIVE: 1
EYES NEGATIVE: 1

## 2018-01-12 NOTE — PROGRESS NOTES
the EG  junction was obtained and I could not definitively see a hiatal hernia. The  scope was straightened was brought back up to the EG junction, which was 38 cm  from the incisors. The scope was withdrawn through the length of the  esophagus that was normal.  The patient tolerated the procedure well.     Liana Morrell M.D. Patient Active Problem List   Diagnosis    Morbid obesity with BMI of 45.0-49.9, adult ([facility] [address]    Morbid obesity due to excess calories ([address]    Diabetes mellitus ([address]    Hypertension    GERD (gastroesophageal reflux disease)     Assessment:   1. Morbid obesity (BMI 50)  2. Diabetes mellitus  3. Sleep apnea  4. Hypertension  5. Asthma  6. GERD  7. Infertility  8. Depression  9. Anxiety    Plan:   1. Discussion again about the pros and cons of weight loss surgery. The risks benefits and alternatives to laparoscopic adjustable band, gastric sleeve and gastric Coby-en-Y bypass were discussed in detail. The pros and cons of robotic assisted, laparoscopic and open techniques were discussed. 2.  Behavior modification discussed in regards to dietary habits. 3.  Nutritional education occurred during visit. Follow-up with dietitian for further evaluation - follow recommendations as directed. 4.  Options for medical management of morbid obesity discussed. 5.  Improvement in fitness/exercise discussed with patient and the need for this with/without surgery. 6.  Medical necessity letter from PCP. 7.  Follow-up in one month at weight management program at [address] [place] [address]. 8.  Signs and symptoms reviewed with patient that would be concerning and need her to return to office for re-evaluation. Patient states she will call if she has questions or concerns. 9. Multivitamin  10. Psychology evaluation completed. Follow-up as needed. 11. EGD completed. Results reviewed. All questions answered. 12.  Encouraged support groups  13.   The pros and cons along with associated risks/complications/side effects of weight loss medications discussed again.   65. Send LOMN    --Patient states that she has not been able to lose enough adequate excess body weight with medical management only and would like to proceed with a sleeve gastrectomy/gastric bypass     -- More than 15 minutes spent with patient today. Greater than 50% of the time was involved with education, counseling and correlating care.

## 2018-01-17 ENCOUNTER — APPOINTMENT (OUTPATIENT)
Dept: GENERAL RADIOLOGY | Age: 28
End: 2018-01-17
Payer: COMMERCIAL

## 2018-01-17 ENCOUNTER — HOSPITAL ENCOUNTER (EMERGENCY)
Age: 28
Discharge: HOME | End: 2018-01-17
Attending: FAMILY MEDICINE
Payer: COMMERCIAL

## 2018-01-17 VITALS
WEIGHT: 293 LBS | DIASTOLIC BLOOD PRESSURE: 95 MMHG | TEMPERATURE: 98.1 F | HEART RATE: 96 BPM | HEIGHT: 70 IN | BODY MASS INDEX: 41.95 KG/M2 | SYSTOLIC BLOOD PRESSURE: 152 MMHG | RESPIRATION RATE: 16 BRPM | OXYGEN SATURATION: 96 %

## 2018-01-17 DIAGNOSIS — J45.21 MILD INTERMITTENT ASTHMA WITH EXACERBATION: Primary | ICD-10-CM

## 2018-01-17 LAB
FLU A ANTIGEN: NEGATIVE
FLU B ANTIGEN: NEGATIVE

## 2018-01-17 PROCEDURE — 87804 INFLUENZA ASSAY W/OPTIC: CPT

## 2018-01-17 PROCEDURE — 6370000000 HC RX 637 (ALT 250 FOR IP): Performed by: FAMILY MEDICINE

## 2018-01-17 PROCEDURE — 6360000002 HC RX W HCPCS

## 2018-01-17 PROCEDURE — 99285 EMERGENCY DEPT VISIT HI MDM: CPT

## 2018-01-17 PROCEDURE — 71046 X-RAY EXAM CHEST 2 VIEWS: CPT

## 2018-01-17 RX ORDER — OSELTAMIVIR PHOSPHATE 75 MG/1
75 CAPSULE ORAL ONCE
Status: COMPLETED | OUTPATIENT
Start: 2018-01-17 | End: 2018-01-17

## 2018-01-17 RX ORDER — OSELTAMIVIR PHOSPHATE 75 MG/1
75 CAPSULE ORAL 2 TIMES DAILY
Qty: 10 CAPSULE | Refills: 0 | Status: SHIPPED | OUTPATIENT
Start: 2018-01-17 | End: 2018-01-22

## 2018-01-17 RX ORDER — ALBUTEROL SULFATE 2.5 MG/3ML
SOLUTION RESPIRATORY (INHALATION)
Status: COMPLETED
Start: 2018-01-17 | End: 2018-01-17

## 2018-01-17 RX ORDER — IPRATROPIUM BROMIDE AND ALBUTEROL SULFATE 2.5; .5 MG/3ML; MG/3ML
1 SOLUTION RESPIRATORY (INHALATION) ONCE
Status: COMPLETED | OUTPATIENT
Start: 2018-01-17 | End: 2018-01-17

## 2018-01-17 RX ADMIN — ALBUTEROL SULFATE 2.5 MG: 2.5 SOLUTION RESPIRATORY (INHALATION) at 18:23

## 2018-01-17 RX ADMIN — OSELTAMIVIR PHOSPHATE 75 MG: 75 CAPSULE ORAL at 19:01

## 2018-01-17 RX ADMIN — IPRATROPIUM BROMIDE AND ALBUTEROL SULFATE 1 AMPULE: .5; 3 SOLUTION RESPIRATORY (INHALATION) at 17:49

## 2018-01-17 ASSESSMENT — PAIN SCALES - GENERAL: PAINLEVEL_OUTOF10: 4

## 2018-01-17 ASSESSMENT — PAIN DESCRIPTION - DESCRIPTORS: DESCRIPTORS: ACHING

## 2018-01-17 ASSESSMENT — PAIN DESCRIPTION - LOCATION: LOCATION: THROAT;CHEST

## 2018-01-17 ASSESSMENT — PAIN DESCRIPTION - PAIN TYPE: TYPE: ACUTE PAIN

## 2018-01-17 ASSESSMENT — PAIN DESCRIPTION - FREQUENCY: FREQUENCY: CONTINUOUS

## 2018-01-18 ASSESSMENT — ENCOUNTER SYMPTOMS
COUGH: 1
CHEST TIGHTNESS: 0
CONSTIPATION: 0
COLOR CHANGE: 0
NAUSEA: 0
DIARRHEA: 0
EYE PAIN: 0
FACIAL SWELLING: 0
BACK PAIN: 0
EYE REDNESS: 0
EYE DISCHARGE: 0
WHEEZING: 1
ABDOMINAL PAIN: 0
PHOTOPHOBIA: 0
VOMITING: 0
SORE THROAT: 1
RHINORRHEA: 0
SHORTNESS OF BREATH: 0
STRIDOR: 0
VOICE CHANGE: 0
ABDOMINAL DISTENTION: 0

## 2018-01-18 NOTE — ED PROVIDER NOTES
[address] [employer]       Chief Complaint   Patient presents with    Cough    Pharyngitis       Nurses Notes reviewed and I agree except as noted in the HPI. HISTORY OF PRESENT ILLNESS    Holger Reece is a 32 y.o. female who presents with worsening cough,mild sore throat. Patient notes wheezing. Patient only recently seen by PCP and started on prednisone. Patient continues to have wheezing. Admits to intermittent fevers. Denies nausea, vomiting. REVIEW OF SYSTEMS     Review of Systems   Constitutional: Positive for activity change and fever. Negative for appetite change, chills and diaphoresis. HENT: Positive for congestion and sore throat. Negative for dental problem, ear pain, facial swelling, rhinorrhea, tinnitus and voice change. Eyes: Negative for photophobia, pain, discharge and redness. Respiratory: Positive for cough and wheezing. Negative for chest tightness, shortness of breath and stridor. Cardiovascular: Negative for chest pain, palpitations and leg swelling. Gastrointestinal: Negative for abdominal distention, abdominal pain, constipation, diarrhea, nausea and vomiting. Genitourinary: Negative for difficulty urinating, dysuria, flank pain, genital sores, pelvic pain, urgency, vaginal bleeding and vaginal discharge. Musculoskeletal: Negative for arthralgias, back pain, joint swelling, myalgias and neck stiffness. Skin: Negative for color change and rash. Neurological: Negative for dizziness, tremors, seizures, syncope, weakness, numbness and headaches. Psychiatric/Behavioral: Negative for agitation, hallucinations, sleep disturbance and suicidal ideas. The patient is not nervous/anxious. All other systems reviewed and are negative. PAST MEDICAL HISTORY    has a past medical history of Anxiety;  Asthma; Chronic back pain; Depression; Diabetes mellitus ([facility] [address] GERD (gastroesophageal reflux disease); GERD (gastroesophageal reflux disease); Headache; Hypertension; Infertility, female; Ulcer ([address] and UTI (urinary tract infection). SURGICAL HISTORY      has a past surgical history that includes Breast surgery; Colonoscopy; and Upper gastrointestinal endoscopy. CURRENT MEDICATIONS       Discharge Medication List as of 2018  7:01 PM      CONTINUE these medications which have NOT CHANGED    Details   aspirin 81 MG tablet Take 81 mg by mouth dailyHistorical Med      Norgestimate-Eth Estradiol (SPRINTEC 28 PO) Take by mouthHistorical Med      metFORMIN (GLUCOPHAGE) 500 MG tablet Take 500 mg by mouth 4 times daily 2 tablets in the morning, then 2 tablets at nightHistorical Med      Calcium Carbonate-Vitamin D (OS-ADIN 500 + D PO) Take by mouth 2 times daily      omeprazole (PRILOSEC) 20 MG delayed release capsule Take 20 mg by mouth daily      venlafaxine (EFFEXOR XR) 75 MG extended release capsule Take 75 mg by mouth daily      levothyroxine (SYNTHROID) 75 MCG tablet 125 mcg Historical Med      labetalol (NORMODYNE) 100 MG tablet 200 mg 2 times daily 1.5 pills in morning, then one pill at night. Historical Med      Albuterol Sulfate (PROVENTIL HFA IN) Inhale into the lungs      EPINEPHrine HCl, Anaphylaxis, (EPIPEN IM) Inject into the muscle             ALLERGIES     is allergic to bee venom; bactrim [sulfamethoxazole-trimethoprim]; mirapex [pramipexole]; and requip [ropinirole]. FAMILY HISTORY     indicated that her mother is alive. She indicated that her father is alive. She indicated that her brother is alive. She indicated that her maternal grandmother is . She indicated that her maternal grandfather is . She indicated that her paternal grandmother is . She indicated that her paternal grandfather is .     family history includes Birth Defects in her mother; Diabetes in her father and maternal grandmother; Heart Disease in her maternal grandfather; High Blood Pressure in her brother and father; Obesity in her brother, father, and mother. SOCIAL HISTORY      reports that she quit smoking about 3 years ago. Her smoking use included Cigarettes. She smoked 0.50 packs per day. She has never used smokeless tobacco. She reports that she does not drink alcohol or use drugs. PHYSICAL EXAM     INITIAL VITALS:  height is 5' 9.5\" (1.765 m) and weight is 342 lb (155.1 kg) (abnormal). Her oral temperature is 98.1 °F (36.7 °C). Her blood pressure is 152/95 (abnormal) and her pulse is 96. Her respiration is 16 and oxygen saturation is 96%. Physical Exam   Constitutional: She is oriented to person, place, and time. She appears well-developed and well-nourished. No distress. HENT:   Head: Normocephalic and atraumatic. Right Ear: External ear normal.   Left Ear: External ear normal.   Nose: Nose normal.   Mouth/Throat: Oropharynx is clear and moist.   Eyes: Conjunctivae and EOM are normal. Pupils are equal, round, and reactive to light. Right eye exhibits no discharge. Left eye exhibits no discharge. No scleral icterus. Neck: Normal range of motion. Neck supple. No JVD present. No tracheal deviation present. No thyromegaly present. Cardiovascular: Normal rate, regular rhythm, normal heart sounds and intact distal pulses. Exam reveals no gallop and no friction rub. No murmur heard. Pulmonary/Chest: Effort normal. No stridor. No respiratory distress. She has wheezes. She has no rales. She exhibits no tenderness. Abdominal: Soft. Bowel sounds are normal. She exhibits no distension and no mass. There is no tenderness. There is no rebound and no guarding. Musculoskeletal: Normal range of motion. She exhibits no edema or tenderness. Lymphadenopathy:     She has no cervical adenopathy. Neurological: She is alert and oriented to person, place, and time. She has normal reflexes. No cranial nerve deficit. She exhibits normal muscle tone. Coordination normal.   Skin: Skin is warm and dry.  No rash (on

## 2018-01-25 ENCOUNTER — TELEPHONE (OUTPATIENT)
Dept: BARIATRICS/WEIGHT MGMT | Age: 28
End: 2018-01-25

## 2018-01-25 NOTE — TELEPHONE ENCOUNTER
Called LVM letting pt know she was approved for surgery, asking her to call us back to we can get a few things scheduled.

## 2018-01-29 ENCOUNTER — OFFICE VISIT (OUTPATIENT)
Dept: BARIATRICS/WEIGHT MGMT | Age: 28
End: 2018-01-29

## 2018-01-29 DIAGNOSIS — E66.01 MORBID OBESITY DUE TO EXCESS CALORIES (HCC): Primary | ICD-10-CM

## 2018-02-05 RX ORDER — LEVOTHYROXINE SODIUM 0.12 MG/1
100 TABLET ORAL EVERY MORNING
COMMUNITY

## 2018-02-05 RX ORDER — PHENOL 1.4 %
1 AEROSOL, SPRAY (ML) MUCOUS MEMBRANE NIGHTLY
COMMUNITY
End: 2018-08-14

## 2018-02-09 ENCOUNTER — OFFICE VISIT (OUTPATIENT)
Dept: BARIATRICS/WEIGHT MGMT | Age: 28
End: 2018-02-09
Payer: COMMERCIAL

## 2018-02-09 VITALS
TEMPERATURE: 97.9 F | DIASTOLIC BLOOD PRESSURE: 78 MMHG | SYSTOLIC BLOOD PRESSURE: 114 MMHG | HEIGHT: 69 IN | BODY MASS INDEX: 43.4 KG/M2 | HEART RATE: 86 BPM | WEIGHT: 293 LBS | RESPIRATION RATE: 18 BRPM

## 2018-02-09 DIAGNOSIS — N97.0 INFERTILITY ASSOCIATED WITH ANOVULATION: ICD-10-CM

## 2018-02-09 DIAGNOSIS — E11.8 TYPE 2 DIABETES MELLITUS WITH COMPLICATION, WITHOUT LONG-TERM CURRENT USE OF INSULIN (HCC): ICD-10-CM

## 2018-02-09 DIAGNOSIS — J45.909 UNCOMPLICATED ASTHMA, UNSPECIFIED ASTHMA SEVERITY, UNSPECIFIED WHETHER PERSISTENT: ICD-10-CM

## 2018-02-09 DIAGNOSIS — M54.50 CHRONIC LOW BACK PAIN WITHOUT SCIATICA, UNSPECIFIED BACK PAIN LATERALITY: ICD-10-CM

## 2018-02-09 DIAGNOSIS — E66.01 MORBID OBESITY DUE TO EXCESS CALORIES (HCC): Primary | ICD-10-CM

## 2018-02-09 DIAGNOSIS — G89.29 CHRONIC LOW BACK PAIN WITHOUT SCIATICA, UNSPECIFIED BACK PAIN LATERALITY: ICD-10-CM

## 2018-02-09 DIAGNOSIS — E03.9 HYPOTHYROIDISM, UNSPECIFIED TYPE: ICD-10-CM

## 2018-02-09 DIAGNOSIS — G47.30 SLEEP APNEA, UNSPECIFIED TYPE: ICD-10-CM

## 2018-02-09 DIAGNOSIS — F32.A DEPRESSION, UNSPECIFIED DEPRESSION TYPE: ICD-10-CM

## 2018-02-09 DIAGNOSIS — K21.9 GASTROESOPHAGEAL REFLUX DISEASE WITHOUT ESOPHAGITIS: ICD-10-CM

## 2018-02-09 DIAGNOSIS — G25.81 RLS (RESTLESS LEGS SYNDROME): ICD-10-CM

## 2018-02-09 DIAGNOSIS — I10 HYPERTENSION, UNSPECIFIED TYPE: ICD-10-CM

## 2018-02-09 DIAGNOSIS — F41.9 ANXIETY: ICD-10-CM

## 2018-02-09 PROCEDURE — G8427 DOCREV CUR MEDS BY ELIG CLIN: HCPCS | Performed by: SURGERY

## 2018-02-09 PROCEDURE — G8484 FLU IMMUNIZE NO ADMIN: HCPCS | Performed by: SURGERY

## 2018-02-09 PROCEDURE — 99213 OFFICE O/P EST LOW 20 MIN: CPT | Performed by: SURGERY

## 2018-02-09 PROCEDURE — 1036F TOBACCO NON-USER: CPT | Performed by: SURGERY

## 2018-02-09 PROCEDURE — G8417 CALC BMI ABV UP PARAM F/U: HCPCS | Performed by: SURGERY

## 2018-02-09 PROCEDURE — 3046F HEMOGLOBIN A1C LEVEL >9.0%: CPT | Performed by: SURGERY

## 2018-02-09 ASSESSMENT — ENCOUNTER SYMPTOMS
DIARRHEA: 1
CONSTIPATION: 1
NAUSEA: 0
VOMITING: 0
APNEA: 1
ALLERGIC/IMMUNOLOGIC NEGATIVE: 1
SHORTNESS OF BREATH: 1
EYES NEGATIVE: 1

## 2018-02-09 NOTE — PROGRESS NOTES
regular rhythm, S1 normal, S2 normal, normal heart sounds, intact distal pulses and normal pulses. Exam reveals no gallop. No murmur heard. Pulmonary/Chest: Effort normal and breath sounds normal. No stridor. No respiratory distress. She has no decreased breath sounds. She has no wheezes. She has no rales. She exhibits no tenderness and no deformity. Abdominal: Soft. Bowel sounds are normal. She exhibits no distension, no fluid wave, no abdominal bruit, no pulsatile midline mass and no mass. There is no hepatosplenomegaly. There is no tenderness. There is no rigidity, no rebound, no guarding and no CVA tenderness. No hernia. Hernia confirmed negative in the ventral area, confirmed negative in the right inguinal area and confirmed negative in the left inguinal area. Musculoskeletal: Normal range of motion. She exhibits no edema or tenderness. Lymphadenopathy:     She has no cervical adenopathy. She has no axillary adenopathy. Right: No inguinal and no supraclavicular adenopathy present. Left: No inguinal and no supraclavicular adenopathy present. Neurological: She is alert and oriented to person, place, and time. She has normal strength. No cranial nerve deficit or sensory deficit. Gait normal.   Skin: Skin is warm and dry. No abrasion, no bruising, no burn, no laceration and no rash noted. She is not diaphoretic. No cyanosis or erythema. No pallor. Nails show no clubbing. Psychiatric: She has a normal mood and affect.  Her speech is normal and behavior is normal. Judgment and thought content normal. Cognition and memory are normal.       CBC   Lab Results   Component Value Date    WBC 7.3 01/12/2018    RBC 4.13 01/12/2018    HGB 12.8 01/12/2018    HCT 38.0 01/12/2018    MCV 92.1 01/12/2018    MCH 31.0 01/12/2018    MCHC 33.7 01/12/2018    RDW 13.8 01/12/2018     01/12/2018    MPV 11.2 01/12/2018    RBCMORP NORMAL 11/03/2016    SEGSPCT 68.2 01/12/2018    LABLYMP 18.6 01/12/2018 degenerative changes are present in the thoracic spine.  Soft tissues are unremarkable.           Impression   Minimal left basilar atelectasis/infiltrate.      EGD:  DATE OF PROCEDURE:  05/16/2017     PREOPERATIVE DIAGNOSIS:  Morbid obesity.     POSTOPERATIVE DIAGNOSIS:  Normal esophagogastroduodenoscopy, no evidence of  gastric masses or ulcers, no evidence of hiatal hernia seen.     OPERATION:  EGD with biopsies for RAQUEL.     SURGEON: Aubrie Prater M.D.     ANESTHESIA:  General.     COMPLICATIONS:  None.     INDICATIONS FOR PROCEDURE:  The patient is a 20-year-old white female in the  weight management program under the direction of Dr. Jett Mckeon patient  is here for pre-gastric sleeve EGD.     FINDINGS:  The patient had no evidence of hiatal hernia.  The EG junction was  38 cm from the incisors.  There were no gastric masses or ulcers.  The pylorus  was patent.  Duodenum was normal.  Biopsies were taken for RAQUEL.     PROCEDURE:  The patient was brought into the endoscopy suite, placed in the  left lateral decubitus position.  After adequate IV anesthetic with propofol  was performed, the Olympus endoscope was inserted into the back of the throat  and easily slid down through the esophagus down to the EG junction, into the  body of stomach, where a patent pylorus was seen and the scope was passed  through this into the duodenum, which was normal.  Scope was withdrawn back  into the antrum where biopsies were taken for RAQUEL.  Retroflexed view of the EG  junction was obtained and I could not definitively see a hiatal hernia.  The  scope was straightened was brought back up to the EG junction, which was 38 cm  from the incisors.  The scope was withdrawn through the length of the  esophagus that was normal.  The patient tolerated the procedure well.     Aubrie Prater M.D.       Patient Active Problem List   Diagnosis    Morbid obesity with BMI of 45.0-49.9, adult ([facility] [address]    Morbid obesity due to excess discussed. All questions answered. She understands and wishes to proceed with surgical intervention. 16. Restrictions discussed with Jessica Began and she expresses understanding. 18. She is advised to call back directly if there are further questions/concerns, or if her symptoms worsen prior to surgery.     --Patient states that she has not been able to lose enough adequate excess body weight with medical management only and would like to proceed with a sleeve gastrectomy/gastric bypass     -- More than 15 minutes spent with patient today.  Greater than 50% of the time was involved with education, counseling and correlating care.

## 2018-02-10 NOTE — H&P
Father      High Blood Pressure Father      Obesity Father      High Blood Pressure Brother      Obesity Brother      Diabetes Maternal Grandmother      Heart Disease Maternal Grandfather              Social History   Social History            Social History    Marital status: Single       Spouse name: N/A    Number of children: N/A    Years of education: 15           Occupational 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[employer]            Social History Main Topics    Smoking status: Former Smoker       Packs/day: 0.50       Types: Cigarettes       Quit date: 5/18/2014    Smokeless tobacco: Never Used    Alcohol use No    Drug use: No    Sexual activity: Yes       Partners: Male           Other Topics Concern    Not on file          Social History Narrative    No narrative on file         /78 (Site: Right Arm, Position: Sitting, Cuff Size: Large Adult)   Pulse 86   Temp 97.9 °F (36.6 °C) (Oral)   Resp 18   Ht 5' 9\" (1.753 m)   Wt (!) 337 lb 6.4 oz (153 kg)   LMP 01/06/2018 (Exact Date)   BMI 49.83 kg/m²      Body mass index is 49.83 kg/m².     Objective:   Physical Exam   Constitutional: She is oriented to person, place, and time. She appears well-developed and well-nourished. She is active and cooperative. Non-toxic appearance. She does not appear ill. No distress. HENT:   Head: Normocephalic and atraumatic. Not macrocephalic and not microcephalic. Head is without raccoon's eyes, without Ferguson's sign, without abrasion, without contusion, without laceration, without right periorbital erythema and without left periorbital erythema. Right Ear: External ear normal.   Left Ear: External ear normal.   Nose: Nose normal.   Mouth/Throat: Oropharynx is clear and moist and mucous membranes are normal. No oropharyngeal exudate. Eyes: Conjunctivae and EOM are normal. Pupils are equal, round, and reactive to light. Right eye exhibits no discharge. Left eye exhibits no discharge.  No scleral icterus. Neck: Trachea normal, normal range of motion, full passive range of motion without pain and phonation normal. Neck supple. No JVD present. No tracheal tenderness present. No tracheal deviation present. No thyroid mass and no thyromegaly present. Cardiovascular: Normal rate, regular rhythm, S1 normal, S2 normal, normal heart sounds, intact distal pulses and normal pulses. Exam reveals no gallop. No murmur heard. Pulmonary/Chest: Effort normal and breath sounds normal. No stridor. No respiratory distress. She has no decreased breath sounds. She has no wheezes. She has no rales. She exhibits no tenderness and no deformity. Abdominal: Soft. Bowel sounds are normal. She exhibits no distension, no fluid wave, no abdominal bruit, no pulsatile midline mass and no mass. There is no hepatosplenomegaly. There is no tenderness. There is no rigidity, no rebound, no guarding and no CVA tenderness. No hernia. Hernia confirmed negative in the ventral area, confirmed negative in the right inguinal area and confirmed negative in the left inguinal area. Musculoskeletal: Normal range of motion. She exhibits no edema or tenderness. Lymphadenopathy:     She has no cervical adenopathy. She has no axillary adenopathy. Right: No inguinal and no supraclavicular adenopathy present. Left: No inguinal and no supraclavicular adenopathy present. Neurological: She is alert and oriented to person, place, and time. She has normal strength. No cranial nerve deficit or sensory deficit. Gait normal.   Skin: Skin is warm and dry. No abrasion, no bruising, no burn, no laceration and no rash noted. She is not diaphoretic. No cyanosis or erythema. No pallor. Nails show no clubbing. Psychiatric: She has a normal mood and affect.  Her speech is normal and behavior is normal. Judgment and thought content normal. Cognition and memory are normal.         CBC         Lab Results   Component Value Date     WBC 7.3

## 2018-02-12 ENCOUNTER — HOSPITAL ENCOUNTER (INPATIENT)
Age: 28
LOS: 2 days | Discharge: HOME | DRG: 403 | End: 2018-02-14
Attending: SURGERY | Admitting: SURGERY
Payer: COMMERCIAL

## 2018-02-12 ENCOUNTER — ANESTHESIA (OUTPATIENT)
Dept: OPERATING ROOM | Age: 28
DRG: 403 | End: 2018-02-12
Payer: COMMERCIAL

## 2018-02-12 ENCOUNTER — ANESTHESIA EVENT (OUTPATIENT)
Dept: OPERATING ROOM | Age: 28
DRG: 403 | End: 2018-02-12
Payer: COMMERCIAL

## 2018-02-12 VITALS — DIASTOLIC BLOOD PRESSURE: 129 MMHG | SYSTOLIC BLOOD PRESSURE: 201 MMHG | OXYGEN SATURATION: 93 % | TEMPERATURE: 96.8 F

## 2018-02-12 DIAGNOSIS — Z98.84 S/P LAPAROSCOPIC SLEEVE GASTRECTOMY: ICD-10-CM

## 2018-02-12 DIAGNOSIS — E66.01 MORBID OBESITY WITH BMI OF 45.0-49.9, ADULT (HCC): Primary | ICD-10-CM

## 2018-02-12 DIAGNOSIS — G89.18 ACUTE POSTOPERATIVE PAIN: Primary | ICD-10-CM

## 2018-02-12 PROBLEM — E66.9 OBESITY: Status: ACTIVE | Noted: 2018-02-12

## 2018-02-12 LAB
GLUCOSE BLD-MCNC: 110 MG/DL (ref 70–108)
GLUCOSE BLD-MCNC: 113 MG/DL (ref 70–108)
GLUCOSE BLD-MCNC: 95 MG/DL (ref 70–108)
POTASSIUM REFLEX MAGNESIUM: 4.2 MEQ/L (ref 3.5–5.2)
PREGNANCY, URINE: NEGATIVE

## 2018-02-12 PROCEDURE — 2500000003 HC RX 250 WO HCPCS: Performed by: SURGERY

## 2018-02-12 PROCEDURE — 6370000000 HC RX 637 (ALT 250 FOR IP): Performed by: SURGERY

## 2018-02-12 PROCEDURE — 8E0W3CZ ROBOTIC ASSISTED PROCEDURE OF TRUNK REGION, PERCUTANEOUS APPROACH: ICD-10-PCS | Performed by: SURGERY

## 2018-02-12 PROCEDURE — S2900 ROBOTIC SURGICAL SYSTEM: HCPCS | Performed by: SURGERY

## 2018-02-12 PROCEDURE — 6360000002 HC RX W HCPCS: Performed by: NURSE ANESTHETIST, CERTIFIED REGISTERED

## 2018-02-12 PROCEDURE — 84132 ASSAY OF SERUM POTASSIUM: CPT

## 2018-02-12 PROCEDURE — 1200000000 HC SEMI PRIVATE

## 2018-02-12 PROCEDURE — 6360000002 HC RX W HCPCS: Performed by: SURGERY

## 2018-02-12 PROCEDURE — 2720000010 HC SURG SUPPLY STERILE: Performed by: SURGERY

## 2018-02-12 PROCEDURE — 2580000003 HC RX 258: Performed by: SURGERY

## 2018-02-12 PROCEDURE — C9113 INJ PANTOPRAZOLE SODIUM, VIA: HCPCS | Performed by: SURGERY

## 2018-02-12 PROCEDURE — 3700000001 HC ADD 15 MINUTES (ANESTHESIA): Performed by: SURGERY

## 2018-02-12 PROCEDURE — 3600000019 HC SURGERY ROBOT ADDTL 15MIN: Performed by: SURGERY

## 2018-02-12 PROCEDURE — 81025 URINE PREGNANCY TEST: CPT

## 2018-02-12 PROCEDURE — 7100000001 HC PACU RECOVERY - ADDTL 15 MIN: Performed by: SURGERY

## 2018-02-12 PROCEDURE — 6360000002 HC RX W HCPCS: Performed by: ANESTHESIOLOGY

## 2018-02-12 PROCEDURE — 6360000002 HC RX W HCPCS

## 2018-02-12 PROCEDURE — S0028 INJECTION, FAMOTIDINE, 20 MG: HCPCS | Performed by: SURGERY

## 2018-02-12 PROCEDURE — 2500000003 HC RX 250 WO HCPCS: Performed by: NURSE ANESTHETIST, CERTIFIED REGISTERED

## 2018-02-12 PROCEDURE — 82948 REAGENT STRIP/BLOOD GLUCOSE: CPT

## 2018-02-12 PROCEDURE — 2780000010 HC IMPLANT OTHER: Performed by: SURGERY

## 2018-02-12 PROCEDURE — 43775 LAP SLEEVE GASTRECTOMY: CPT | Performed by: SURGERY

## 2018-02-12 PROCEDURE — 3600000009 HC SURGERY ROBOT BASE: Performed by: SURGERY

## 2018-02-12 PROCEDURE — 88300 SURGICAL PATH GROSS: CPT

## 2018-02-12 PROCEDURE — 2500000003 HC RX 250 WO HCPCS

## 2018-02-12 PROCEDURE — 0DB64Z3 EXCISION OF STOMACH, PERCUTANEOUS ENDOSCOPIC APPROACH, VERTICAL: ICD-10-PCS | Performed by: SURGERY

## 2018-02-12 PROCEDURE — 3700000000 HC ANESTHESIA ATTENDED CARE: Performed by: SURGERY

## 2018-02-12 PROCEDURE — 36415 COLL VENOUS BLD VENIPUNCTURE: CPT

## 2018-02-12 PROCEDURE — 7100000000 HC PACU RECOVERY - FIRST 15 MIN: Performed by: SURGERY

## 2018-02-12 PROCEDURE — C9250 ARTISS FIBRIN SEALANT: HCPCS | Performed by: SURGERY

## 2018-02-12 RX ORDER — ALBUTEROL SULFATE 90 UG/1
2 AEROSOL, METERED RESPIRATORY (INHALATION) EVERY 6 HOURS PRN
Status: DISCONTINUED | OUTPATIENT
Start: 2018-02-12 | End: 2018-02-14 | Stop reason: HOSPADM

## 2018-02-12 RX ORDER — ACETAMINOPHEN 325 MG/1
650 TABLET ORAL EVERY 4 HOURS PRN
Status: DISCONTINUED | OUTPATIENT
Start: 2018-02-12 | End: 2018-02-14 | Stop reason: HOSPADM

## 2018-02-12 RX ORDER — HYOSCYAMINE SULFATE 0.125 MG
125 TABLET,DISINTEGRATING ORAL EVERY 4 HOURS PRN
Status: DISCONTINUED | OUTPATIENT
Start: 2018-02-12 | End: 2018-02-14 | Stop reason: HOSPADM

## 2018-02-12 RX ORDER — LABETALOL HYDROCHLORIDE 5 MG/ML
5 INJECTION, SOLUTION INTRAVENOUS EVERY 10 MIN PRN
Status: DISCONTINUED | OUTPATIENT
Start: 2018-02-12 | End: 2018-02-12 | Stop reason: HOSPADM

## 2018-02-12 RX ORDER — SODIUM CHLORIDE 9 MG/ML
INJECTION, SOLUTION INTRAVENOUS CONTINUOUS
Status: DISCONTINUED | OUTPATIENT
Start: 2018-02-12 | End: 2018-02-12

## 2018-02-12 RX ORDER — ONDANSETRON 4 MG/1
4 TABLET, FILM COATED ORAL EVERY 4 HOURS PRN
Qty: 30 TABLET | Refills: 0 | Status: ON HOLD | OUTPATIENT
Start: 2018-02-12 | End: 2018-03-06 | Stop reason: HOSPADM

## 2018-02-12 RX ORDER — SODIUM CHLORIDE 0.9 % (FLUSH) 0.9 %
10 SYRINGE (ML) INJECTION PRN
Status: DISCONTINUED | OUTPATIENT
Start: 2018-02-12 | End: 2018-02-12 | Stop reason: HOSPADM

## 2018-02-12 RX ORDER — OMEPRAZOLE 40 MG/1
40 CAPSULE, DELAYED RELEASE ORAL DAILY
Qty: 30 CAPSULE | Refills: 2 | Status: ON HOLD | OUTPATIENT
Start: 2018-02-12 | End: 2018-03-02

## 2018-02-12 RX ORDER — MIDAZOLAM HYDROCHLORIDE 1 MG/ML
2 INJECTION INTRAMUSCULAR; INTRAVENOUS ONCE
Status: COMPLETED | OUTPATIENT
Start: 2018-02-12 | End: 2018-02-12

## 2018-02-12 RX ORDER — PROMETHAZINE HYDROCHLORIDE 25 MG/ML
6.25 INJECTION, SOLUTION INTRAMUSCULAR; INTRAVENOUS EVERY 6 HOURS PRN
Status: DISCONTINUED | OUTPATIENT
Start: 2018-02-12 | End: 2018-02-14 | Stop reason: HOSPADM

## 2018-02-12 RX ORDER — SCOLOPAMINE TRANSDERMAL SYSTEM 1 MG/1
1 PATCH, EXTENDED RELEASE TRANSDERMAL
Status: DISCONTINUED | OUTPATIENT
Start: 2018-02-12 | End: 2018-02-14 | Stop reason: HOSPADM

## 2018-02-12 RX ORDER — FENTANYL CITRATE 50 UG/ML
INJECTION, SOLUTION INTRAMUSCULAR; INTRAVENOUS PRN
Status: DISCONTINUED | OUTPATIENT
Start: 2018-02-12 | End: 2018-02-12 | Stop reason: SDUPTHER

## 2018-02-12 RX ORDER — PROMETHAZINE HYDROCHLORIDE 25 MG/ML
INJECTION, SOLUTION INTRAMUSCULAR; INTRAVENOUS
Status: COMPLETED
Start: 2018-02-12 | End: 2018-02-12

## 2018-02-12 RX ORDER — BUPIVACAINE HYDROCHLORIDE AND EPINEPHRINE 5; 5 MG/ML; UG/ML
INJECTION, SOLUTION EPIDURAL; INTRACAUDAL; PERINEURAL PRN
Status: DISCONTINUED | OUTPATIENT
Start: 2018-02-12 | End: 2018-02-12 | Stop reason: HOSPADM

## 2018-02-12 RX ORDER — SODIUM CHLORIDE 9 MG/ML
INJECTION, SOLUTION INTRAVENOUS CONTINUOUS
Status: DISCONTINUED | OUTPATIENT
Start: 2018-02-12 | End: 2018-02-14 | Stop reason: HOSPADM

## 2018-02-12 RX ORDER — SCOLOPAMINE TRANSDERMAL SYSTEM 1 MG/1
1 PATCH, EXTENDED RELEASE TRANSDERMAL
Status: DISCONTINUED | OUTPATIENT
Start: 2018-02-12 | End: 2018-02-12

## 2018-02-12 RX ORDER — DEXTROSE AND SODIUM CHLORIDE 5; .9 G/100ML; G/100ML
100 INJECTION, SOLUTION INTRAVENOUS PRN
Status: DISCONTINUED | OUTPATIENT
Start: 2018-02-12 | End: 2018-02-14 | Stop reason: HOSPADM

## 2018-02-12 RX ORDER — METOCLOPRAMIDE HYDROCHLORIDE 5 MG/ML
10 INJECTION INTRAMUSCULAR; INTRAVENOUS EVERY 6 HOURS PRN
Status: DISCONTINUED | OUTPATIENT
Start: 2018-02-12 | End: 2018-02-14 | Stop reason: HOSPADM

## 2018-02-12 RX ORDER — SUCCINYLCHOLINE CHLORIDE 20 MG/ML
INJECTION INTRAMUSCULAR; INTRAVENOUS PRN
Status: DISCONTINUED | OUTPATIENT
Start: 2018-02-12 | End: 2018-02-12 | Stop reason: SDUPTHER

## 2018-02-12 RX ORDER — SODIUM CHLORIDE 0.9 % (FLUSH) 0.9 %
10 SYRINGE (ML) INJECTION PRN
Status: DISCONTINUED | OUTPATIENT
Start: 2018-02-12 | End: 2018-02-14 | Stop reason: HOSPADM

## 2018-02-12 RX ORDER — ASPIRIN 81 MG
100 TABLET, DELAYED RELEASE (ENTERIC COATED) ORAL 2 TIMES DAILY
Qty: 30 TABLET | Refills: 1 | Status: ON HOLD | OUTPATIENT
Start: 2018-02-12 | End: 2018-03-06 | Stop reason: HOSPADM

## 2018-02-12 RX ORDER — NEOSTIGMINE METHYLSULFATE 1 MG/ML
INJECTION, SOLUTION INTRAVENOUS PRN
Status: DISCONTINUED | OUTPATIENT
Start: 2018-02-12 | End: 2018-02-12 | Stop reason: SDUPTHER

## 2018-02-12 RX ORDER — KETAMINE HYDROCHLORIDE 50 MG/ML
INJECTION, SOLUTION, CONCENTRATE INTRAMUSCULAR; INTRAVENOUS PRN
Status: DISCONTINUED | OUTPATIENT
Start: 2018-02-12 | End: 2018-02-12 | Stop reason: SDUPTHER

## 2018-02-12 RX ORDER — ONDANSETRON 2 MG/ML
INJECTION INTRAMUSCULAR; INTRAVENOUS PRN
Status: DISCONTINUED | OUTPATIENT
Start: 2018-02-12 | End: 2018-02-12 | Stop reason: SDUPTHER

## 2018-02-12 RX ORDER — DEXAMETHASONE SODIUM PHOSPHATE 4 MG/ML
INJECTION, SOLUTION INTRA-ARTICULAR; INTRALESIONAL; INTRAMUSCULAR; INTRAVENOUS; SOFT TISSUE PRN
Status: DISCONTINUED | OUTPATIENT
Start: 2018-02-12 | End: 2018-02-12 | Stop reason: SDUPTHER

## 2018-02-12 RX ORDER — DEXTROSE MONOHYDRATE 25 G/50ML
12.5 INJECTION, SOLUTION INTRAVENOUS PRN
Status: DISCONTINUED | OUTPATIENT
Start: 2018-02-12 | End: 2018-02-14 | Stop reason: HOSPADM

## 2018-02-12 RX ORDER — SODIUM CHLORIDE 0.9 % (FLUSH) 0.9 %
10 SYRINGE (ML) INJECTION EVERY 12 HOURS SCHEDULED
Status: DISCONTINUED | OUTPATIENT
Start: 2018-02-12 | End: 2018-02-14 | Stop reason: HOSPADM

## 2018-02-12 RX ORDER — 0.9 % SODIUM CHLORIDE 0.9 %
10 VIAL (ML) INJECTION DAILY
Status: DISCONTINUED | OUTPATIENT
Start: 2018-02-12 | End: 2018-02-14 | Stop reason: SDUPTHER

## 2018-02-12 RX ORDER — METOCLOPRAMIDE 10 MG/1
10 TABLET ORAL EVERY 6 HOURS PRN
Qty: 30 TABLET | Refills: 0 | Status: ON HOLD | OUTPATIENT
Start: 2018-02-12 | End: 2018-03-06 | Stop reason: HOSPADM

## 2018-02-12 RX ORDER — PROMETHAZINE HYDROCHLORIDE 25 MG/ML
25 INJECTION, SOLUTION INTRAMUSCULAR; INTRAVENOUS ONCE
Status: COMPLETED | OUTPATIENT
Start: 2018-02-12 | End: 2018-02-12

## 2018-02-12 RX ORDER — LIDOCAINE HYDROCHLORIDE 20 MG/ML
INJECTION, SOLUTION INFILTRATION; PERINEURAL PRN
Status: DISCONTINUED | OUTPATIENT
Start: 2018-02-12 | End: 2018-02-12 | Stop reason: SDUPTHER

## 2018-02-12 RX ORDER — PROMETHAZINE HYDROCHLORIDE 25 MG/1
25 SUPPOSITORY RECTAL EVERY 6 HOURS PRN
Status: DISCONTINUED | OUTPATIENT
Start: 2018-02-12 | End: 2018-02-14 | Stop reason: HOSPADM

## 2018-02-12 RX ORDER — NICOTINE POLACRILEX 4 MG
15 LOZENGE BUCCAL PRN
Status: DISCONTINUED | OUTPATIENT
Start: 2018-02-12 | End: 2018-02-14 | Stop reason: HOSPADM

## 2018-02-12 RX ORDER — ROCURONIUM BROMIDE 10 MG/ML
INJECTION, SOLUTION INTRAVENOUS PRN
Status: DISCONTINUED | OUTPATIENT
Start: 2018-02-12 | End: 2018-02-12 | Stop reason: SDUPTHER

## 2018-02-12 RX ORDER — MORPHINE SULFATE 4 MG/ML
4 INJECTION, SOLUTION INTRAMUSCULAR; INTRAVENOUS
Status: DISCONTINUED | OUTPATIENT
Start: 2018-02-12 | End: 2018-02-14 | Stop reason: HOSPADM

## 2018-02-12 RX ORDER — PANTOPRAZOLE SODIUM 40 MG/10ML
40 INJECTION, POWDER, LYOPHILIZED, FOR SOLUTION INTRAVENOUS DAILY
Status: DISCONTINUED | OUTPATIENT
Start: 2018-02-12 | End: 2018-02-14 | Stop reason: SDUPTHER

## 2018-02-12 RX ORDER — MIDAZOLAM HYDROCHLORIDE 1 MG/ML
INJECTION INTRAMUSCULAR; INTRAVENOUS PRN
Status: DISCONTINUED | OUTPATIENT
Start: 2018-02-12 | End: 2018-02-12 | Stop reason: SDUPTHER

## 2018-02-12 RX ORDER — SODIUM CHLORIDE 0.9 % (FLUSH) 0.9 %
10 SYRINGE (ML) INJECTION EVERY 12 HOURS SCHEDULED
Status: DISCONTINUED | OUTPATIENT
Start: 2018-02-12 | End: 2018-02-12 | Stop reason: HOSPADM

## 2018-02-12 RX ORDER — KETOROLAC TROMETHAMINE 30 MG/ML
30 INJECTION, SOLUTION INTRAMUSCULAR; INTRAVENOUS EVERY 6 HOURS
Status: DISCONTINUED | OUTPATIENT
Start: 2018-02-12 | End: 2018-02-14 | Stop reason: HOSPADM

## 2018-02-12 RX ORDER — KETOROLAC TROMETHAMINE 30 MG/ML
INJECTION, SOLUTION INTRAMUSCULAR; INTRAVENOUS PRN
Status: DISCONTINUED | OUTPATIENT
Start: 2018-02-12 | End: 2018-02-12 | Stop reason: SDUPTHER

## 2018-02-12 RX ORDER — GLYCOPYRROLATE 0.2 MG/ML
INJECTION INTRAMUSCULAR; INTRAVENOUS PRN
Status: DISCONTINUED | OUTPATIENT
Start: 2018-02-12 | End: 2018-02-12 | Stop reason: SDUPTHER

## 2018-02-12 RX ORDER — LABETALOL HYDROCHLORIDE 5 MG/ML
INJECTION, SOLUTION INTRAVENOUS
Status: COMPLETED
Start: 2018-02-12 | End: 2018-02-12

## 2018-02-12 RX ORDER — MORPHINE SULFATE 2 MG/ML
2 INJECTION, SOLUTION INTRAMUSCULAR; INTRAVENOUS
Status: DISCONTINUED | OUTPATIENT
Start: 2018-02-12 | End: 2018-02-14 | Stop reason: HOSPADM

## 2018-02-12 RX ORDER — ONDANSETRON 2 MG/ML
4 INJECTION INTRAMUSCULAR; INTRAVENOUS ONCE
Status: COMPLETED | OUTPATIENT
Start: 2018-02-12 | End: 2018-02-12

## 2018-02-12 RX ORDER — LEVOTHYROXINE SODIUM 0.12 MG/1
125 TABLET ORAL EVERY MORNING
Status: DISCONTINUED | OUTPATIENT
Start: 2018-02-13 | End: 2018-02-14 | Stop reason: HOSPADM

## 2018-02-12 RX ORDER — ONDANSETRON 2 MG/ML
4 INJECTION INTRAMUSCULAR; INTRAVENOUS EVERY 6 HOURS
Status: DISCONTINUED | OUTPATIENT
Start: 2018-02-12 | End: 2018-02-14 | Stop reason: HOSPADM

## 2018-02-12 RX ADMIN — KETOROLAC TROMETHAMINE 30 MG: 30 INJECTION, SOLUTION INTRAMUSCULAR; INTRAVENOUS at 13:34

## 2018-02-12 RX ADMIN — METOCLOPRAMIDE 10 MG: 5 INJECTION, SOLUTION INTRAMUSCULAR; INTRAVENOUS at 21:43

## 2018-02-12 RX ADMIN — GLYCOPYRROLATE 0.8 MG: 0.2 INJECTION, SOLUTION INTRAMUSCULAR; INTRAVENOUS at 13:34

## 2018-02-12 RX ADMIN — CEFOXITIN 2 G: 2 INJECTION, POWDER, FOR SOLUTION INTRAVENOUS at 12:40

## 2018-02-12 RX ADMIN — HYDROMORPHONE HYDROCHLORIDE 0.5 MG: 1 INJECTION, SOLUTION INTRAMUSCULAR; INTRAVENOUS; SUBCUTANEOUS at 14:05

## 2018-02-12 RX ADMIN — ONDANSETRON 4 MG: 2 INJECTION INTRAMUSCULAR; INTRAVENOUS at 16:57

## 2018-02-12 RX ADMIN — FENTANYL CITRATE 50 MCG: 50 INJECTION INTRAMUSCULAR; INTRAVENOUS at 13:36

## 2018-02-12 RX ADMIN — MIDAZOLAM HYDROCHLORIDE 2 MG: 1 INJECTION, SOLUTION INTRAMUSCULAR; INTRAVENOUS at 12:30

## 2018-02-12 RX ADMIN — FENTANYL CITRATE 100 MCG: 50 INJECTION INTRAMUSCULAR; INTRAVENOUS at 12:33

## 2018-02-12 RX ADMIN — MORPHINE SULFATE 2 MG: 2 INJECTION, SOLUTION INTRAMUSCULAR; INTRAVENOUS at 15:58

## 2018-02-12 RX ADMIN — ONDANSETRON 4 MG: 2 INJECTION INTRAMUSCULAR; INTRAVENOUS at 12:41

## 2018-02-12 RX ADMIN — LABETALOL HYDROCHLORIDE 5 MG: 5 INJECTION INTRAVENOUS at 14:50

## 2018-02-12 RX ADMIN — PROMETHAZINE HYDROCHLORIDE 25 MG: 25 INJECTION, SOLUTION INTRAMUSCULAR; INTRAVENOUS at 14:10

## 2018-02-12 RX ADMIN — MORPHINE SULFATE 2 MG: 2 INJECTION, SOLUTION INTRAMUSCULAR; INTRAVENOUS at 16:53

## 2018-02-12 RX ADMIN — DEXAMETHASONE SODIUM PHOSPHATE 8 MG: 4 INJECTION, SOLUTION INTRAMUSCULAR; INTRAVENOUS at 12:41

## 2018-02-12 RX ADMIN — SUCCINYLCHOLINE CHLORIDE 160 MG: 20 INJECTION, SOLUTION INTRAMUSCULAR; INTRAVENOUS at 12:33

## 2018-02-12 RX ADMIN — HYDROMORPHONE HYDROCHLORIDE 0.5 MG: 1 INJECTION, SOLUTION INTRAMUSCULAR; INTRAVENOUS; SUBCUTANEOUS at 14:30

## 2018-02-12 RX ADMIN — HYDROMORPHONE HYDROCHLORIDE 0.5 MG: 1 INJECTION, SOLUTION INTRAMUSCULAR; INTRAVENOUS; SUBCUTANEOUS at 14:20

## 2018-02-12 RX ADMIN — SODIUM CHLORIDE: 9 INJECTION, SOLUTION INTRAVENOUS at 21:42

## 2018-02-12 RX ADMIN — PROPOFOL 200 MG: 10 INJECTION, EMULSION INTRAVENOUS at 12:33

## 2018-02-12 RX ADMIN — PANTOPRAZOLE SODIUM 40 MG: 40 INJECTION, POWDER, FOR SOLUTION INTRAVENOUS at 16:58

## 2018-02-12 RX ADMIN — LIDOCAINE HYDROCHLORIDE 60 MG: 20 INJECTION, SOLUTION INFILTRATION; PERINEURAL at 12:33

## 2018-02-12 RX ADMIN — FAMOTIDINE 20 MG: 10 INJECTION, SOLUTION INTRAVENOUS at 10:33

## 2018-02-12 RX ADMIN — SODIUM CHLORIDE: 9 INJECTION, SOLUTION INTRAVENOUS at 13:00

## 2018-02-12 RX ADMIN — MORPHINE SULFATE 4 MG: 2 INJECTION, SOLUTION INTRAMUSCULAR; INTRAVENOUS at 19:34

## 2018-02-12 RX ADMIN — HYOSCYAMINE SULFATE 125 MCG: 0.12 TABLET, ORALLY DISINTEGRATING ORAL at 16:35

## 2018-02-12 RX ADMIN — SODIUM CHLORIDE: 9 INJECTION, SOLUTION INTRAVENOUS at 14:40

## 2018-02-12 RX ADMIN — MIDAZOLAM 2 MG: 1 INJECTION INTRAMUSCULAR; INTRAVENOUS at 11:32

## 2018-02-12 RX ADMIN — Medication 40 MG: at 12:40

## 2018-02-12 RX ADMIN — ONDANSETRON 4 MG: 2 INJECTION INTRAMUSCULAR; INTRAVENOUS at 10:27

## 2018-02-12 RX ADMIN — SODIUM CHLORIDE: 9 INJECTION, SOLUTION INTRAVENOUS at 10:32

## 2018-02-12 RX ADMIN — Medication 10 MG: at 13:15

## 2018-02-12 RX ADMIN — SODIUM CHLORIDE: 9 INJECTION, SOLUTION INTRAVENOUS at 12:26

## 2018-02-12 RX ADMIN — Medication 10 ML: at 16:58

## 2018-02-12 RX ADMIN — KETOROLAC TROMETHAMINE 30 MG: 30 INJECTION, SOLUTION INTRAMUSCULAR at 18:42

## 2018-02-12 RX ADMIN — PROMETHAZINE HYDROCHLORIDE 25 MG: 25 INJECTION INTRAMUSCULAR; INTRAVENOUS at 14:10

## 2018-02-12 RX ADMIN — KETAMINE HYDROCHLORIDE 50 MG: 50 INJECTION, SOLUTION INTRAMUSCULAR; INTRAVENOUS at 12:42

## 2018-02-12 RX ADMIN — HYDROMORPHONE HYDROCHLORIDE 0.5 MG: 1 INJECTION, SOLUTION INTRAMUSCULAR; INTRAVENOUS; SUBCUTANEOUS at 14:10

## 2018-02-12 RX ADMIN — LABETALOL HYDROCHLORIDE 5 MG: 5 INJECTION, SOLUTION INTRAVENOUS at 14:50

## 2018-02-12 RX ADMIN — HYOSCYAMINE SULFATE 125 MCG: 0.12 TABLET, ORALLY DISINTEGRATING ORAL at 20:34

## 2018-02-12 RX ADMIN — FENTANYL CITRATE 50 MCG: 50 INJECTION INTRAMUSCULAR; INTRAVENOUS at 13:53

## 2018-02-12 RX ADMIN — NEOSTIGMINE METHYLSULFATE 4 MG: 1 INJECTION, SOLUTION INTRAVENOUS at 13:34

## 2018-02-12 ASSESSMENT — PULMONARY FUNCTION TESTS
PIF_VALUE: 28
PIF_VALUE: 24
PIF_VALUE: 2
PIF_VALUE: 31
PIF_VALUE: 1
PIF_VALUE: 21
PIF_VALUE: 27
PIF_VALUE: 25
PIF_VALUE: 27
PIF_VALUE: 27
PIF_VALUE: 26
PIF_VALUE: 3
PIF_VALUE: 24
PIF_VALUE: 32
PIF_VALUE: 2
PIF_VALUE: 27
PIF_VALUE: 3
PIF_VALUE: 27
PIF_VALUE: 28
PIF_VALUE: 28
PIF_VALUE: 3
PIF_VALUE: 1
PIF_VALUE: 27
PIF_VALUE: 24
PIF_VALUE: 26
PIF_VALUE: 27
PIF_VALUE: 28
PIF_VALUE: 29
PIF_VALUE: 31
PIF_VALUE: 7
PIF_VALUE: 21
PIF_VALUE: 13
PIF_VALUE: 24
PIF_VALUE: 24
PIF_VALUE: 2
PIF_VALUE: 26
PIF_VALUE: 27
PIF_VALUE: 24
PIF_VALUE: 5
PIF_VALUE: 7
PIF_VALUE: 27
PIF_VALUE: 1
PIF_VALUE: 4
PIF_VALUE: 28
PIF_VALUE: 26
PIF_VALUE: 27
PIF_VALUE: 27
PIF_VALUE: 25
PIF_VALUE: 2
PIF_VALUE: 25
PIF_VALUE: 20
PIF_VALUE: 28
PIF_VALUE: 27
PIF_VALUE: 27
PIF_VALUE: 0
PIF_VALUE: 0
PIF_VALUE: 27
PIF_VALUE: 26
PIF_VALUE: 29
PIF_VALUE: 21
PIF_VALUE: 27
PIF_VALUE: 23
PIF_VALUE: 26
PIF_VALUE: 27
PIF_VALUE: 27
PIF_VALUE: 24
PIF_VALUE: 31
PIF_VALUE: 24
PIF_VALUE: 2
PIF_VALUE: 3
PIF_VALUE: 19
PIF_VALUE: 27
PIF_VALUE: 23
PIF_VALUE: 29
PIF_VALUE: 26
PIF_VALUE: 24
PIF_VALUE: 27
PIF_VALUE: 27
PIF_VALUE: 26
PIF_VALUE: 26
PIF_VALUE: 2
PIF_VALUE: 27
PIF_VALUE: 8

## 2018-02-12 ASSESSMENT — PAIN SCALES - GENERAL
PAINLEVEL_OUTOF10: 10
PAINLEVEL_OUTOF10: 8
PAINLEVEL_OUTOF10: 10
PAINLEVEL_OUTOF10: 8
PAINLEVEL_OUTOF10: 10

## 2018-02-12 ASSESSMENT — PAIN DESCRIPTION - FREQUENCY: FREQUENCY: INTERMITTENT

## 2018-02-12 ASSESSMENT — PAIN DESCRIPTION - PAIN TYPE
TYPE: SURGICAL PAIN

## 2018-02-12 ASSESSMENT — PAIN DESCRIPTION - DESCRIPTORS
DESCRIPTORS: ACHING;DISCOMFORT
DESCRIPTORS: ACHING;DISCOMFORT

## 2018-02-12 ASSESSMENT — PAIN DESCRIPTION - ORIENTATION
ORIENTATION: MID
ORIENTATION: MID

## 2018-02-12 ASSESSMENT — PAIN DESCRIPTION - LOCATION
LOCATION: ABDOMEN

## 2018-02-12 ASSESSMENT — PAIN - FUNCTIONAL ASSESSMENT: PAIN_FUNCTIONAL_ASSESSMENT: 0-10

## 2018-02-12 NOTE — PROGRESS NOTES
1040: Patient admitted to [facility] room 17. Family at bedside. Arm bands applied. Bilat. Socks, SCD's applied. Call light in reach. Consent signed.

## 2018-02-12 NOTE — BRIEF OP NOTE
Brief Postoperative Note    Lula Salinas  YOB: 1990  [id number]    Pre-operative Diagnosis: 1. Morbid obesity 2. BMI 48   3. Sleep apnea   4. Diabetes mellitus   5. Hypertension    Post-operative Diagnosis: Same    Procedure: 1. Robotic sleeve gastrectomy    Anesthesia: General and Local    Surgeons/Assistants: Drake. Alu    Estimated Blood Loss: 10 ml    Complications: None    Specimens: Was Obtained: stomach    Findings: as above    Electronically signed by Yanet Abdi MD on 2/12/2018 at 6:32 AM

## 2018-02-12 NOTE — INTERVAL H&P NOTE
[address]  History and Physical Update    Pt Name: Jamaal Caceres  MRN: [id number]  YOB: 1990  Date of evaluation: 2/12/2018    [x] I have examined the patient and reviewed the H&P/Consult and there are no changes to the patient or plans.     [] I have examined the patient and reviewed the H&P/Consult and have noted the following changes:        Delicia Trinh  Electronically signed 2/12/2018 at 11:39 AM

## 2018-02-12 NOTE — ANESTHESIA PRE PROCEDURE
142 01/12/2018    K 4.2 02/12/2018     01/12/2018    CO2 24 01/12/2018    BUN 9 01/12/2018    CREATININE 0.7 01/12/2018    LABGLOM >90 01/12/2018    GLUCOSE 134 01/12/2018    PROT 7.3 11/03/2016    CALCIUM 8.9 01/12/2018    BILITOT 0.3 11/03/2016    ALKPHOS 76 11/03/2016    AST 31 11/03/2016    ALT 31 11/03/2016       POC Tests:   Recent Labs      02/12/18   0936   POCGLU  95       Coags:   Lab Results   Component Value Date    INR 0.88 03/31/2017    APTT 28.2 03/31/2017       HCG (If Applicable):   Lab Results   Component Value Date    PREGTESTUR NEGATIVE 02/12/2018    PREGSERUM NEGATIVE 11/03/2016        ABGs: No results found for: PHART, PO2ART, PQM9BSO, VLO3FGM, BEART, N0YFCIAV     Type & Screen (If Applicable):  No results found for: LABABO, [address]    Anesthesia Evaluation  Patient summary reviewed and Nursing notes reviewed no history of anesthetic complications:   Airway: Mallampati: II  TM distance: >3 FB   Neck ROM: full  Mouth opening: > = 3 FB Dental:          Pulmonary: breath sounds clear to auscultation  (+) asthma:                            Cardiovascular:  Exercise tolerance: good (>4 METS),   (+) hypertension:,         Rhythm: regular  Rate: normal                    Neuro/Psych:   (+) headaches:, psychiatric history:            GI/Hepatic/Renal:   (+) GERD:,           Endo/Other:                     Abdominal:       Abdomen: soft. Vascular:                                        Anesthesia Plan      general     ASA 3       Induction: intravenous. MIPS: Postoperative opioids intended and Prophylactic antiemetics administered. Anesthetic plan and risks discussed with patient and spouse.       Plan discussed with CRNA, attending and surgical team.                  Inna Munoz DO   2/12/2018

## 2018-02-13 ENCOUNTER — APPOINTMENT (OUTPATIENT)
Dept: GENERAL RADIOLOGY | Age: 28
DRG: 403 | End: 2018-02-13
Attending: SURGERY
Payer: COMMERCIAL

## 2018-02-13 LAB
ANION GAP SERPL CALCULATED.3IONS-SCNC: 14 MEQ/L (ref 8–16)
BUN BLDV-MCNC: 8 MG/DL (ref 7–22)
CALCIUM SERPL-MCNC: 8.7 MG/DL (ref 8.5–10.5)
CHLORIDE BLD-SCNC: 105 MEQ/L (ref 98–111)
CO2: 19 MEQ/L (ref 23–33)
CREAT SERPL-MCNC: 0.7 MG/DL (ref 0.4–1.2)
GFR SERPL CREATININE-BSD FRML MDRD: > 90 ML/MIN/1.73M2
GLUCOSE BLD-MCNC: 101 MG/DL (ref 70–108)
GLUCOSE BLD-MCNC: 81 MG/DL (ref 70–108)
GLUCOSE BLD-MCNC: 91 MG/DL (ref 70–108)
HCT VFR BLD CALC: 37.2 % (ref 37–47)
HEMOGLOBIN: 12.4 GM/DL (ref 12–16)
POTASSIUM REFLEX MAGNESIUM: 4.3 MEQ/L (ref 3.5–5.2)
SODIUM BLD-SCNC: 138 MEQ/L (ref 135–145)

## 2018-02-13 PROCEDURE — 74240 X-RAY XM UPR GI TRC 1CNTRST: CPT

## 2018-02-13 PROCEDURE — 85018 HEMOGLOBIN: CPT

## 2018-02-13 PROCEDURE — 36415 COLL VENOUS BLD VENIPUNCTURE: CPT

## 2018-02-13 PROCEDURE — 2580000003 HC RX 258: Performed by: SURGERY

## 2018-02-13 PROCEDURE — 6370000000 HC RX 637 (ALT 250 FOR IP): Performed by: NURSE PRACTITIONER

## 2018-02-13 PROCEDURE — 2700000000 HC OXYGEN THERAPY PER DAY

## 2018-02-13 PROCEDURE — 1200000000 HC SEMI PRIVATE

## 2018-02-13 PROCEDURE — 6360000002 HC RX W HCPCS: Performed by: SURGERY

## 2018-02-13 PROCEDURE — 80048 BASIC METABOLIC PNL TOTAL CA: CPT

## 2018-02-13 PROCEDURE — 82948 REAGENT STRIP/BLOOD GLUCOSE: CPT

## 2018-02-13 PROCEDURE — 99024 POSTOP FOLLOW-UP VISIT: CPT | Performed by: NURSE PRACTITIONER

## 2018-02-13 PROCEDURE — C9113 INJ PANTOPRAZOLE SODIUM, VIA: HCPCS | Performed by: SURGERY

## 2018-02-13 PROCEDURE — 6360000004 HC RX CONTRAST MEDICATION: Performed by: SURGERY

## 2018-02-13 PROCEDURE — 85014 HEMATOCRIT: CPT

## 2018-02-13 PROCEDURE — 6370000000 HC RX 637 (ALT 250 FOR IP): Performed by: SURGERY

## 2018-02-13 PROCEDURE — 2500000003 HC RX 250 WO HCPCS: Performed by: SURGERY

## 2018-02-13 RX ORDER — LABETALOL 200 MG/1
200 TABLET, FILM COATED ORAL 2 TIMES DAILY
Status: DISCONTINUED | OUTPATIENT
Start: 2018-02-13 | End: 2018-02-14 | Stop reason: HOSPADM

## 2018-02-13 RX ORDER — DOCUSATE SODIUM 100 MG/1
100 CAPSULE, LIQUID FILLED ORAL 2 TIMES DAILY
Status: DISCONTINUED | OUTPATIENT
Start: 2018-02-13 | End: 2018-02-14 | Stop reason: HOSPADM

## 2018-02-13 RX ORDER — OXYCODONE HYDROCHLORIDE 5 MG/1
5 TABLET ORAL EVERY 4 HOURS PRN
Status: DISCONTINUED | OUTPATIENT
Start: 2018-02-13 | End: 2018-02-14 | Stop reason: HOSPADM

## 2018-02-13 RX ADMIN — ONDANSETRON 4 MG: 2 INJECTION INTRAMUSCULAR; INTRAVENOUS at 18:26

## 2018-02-13 RX ADMIN — MORPHINE SULFATE 4 MG: 2 INJECTION, SOLUTION INTRAMUSCULAR; INTRAVENOUS at 14:54

## 2018-02-13 RX ADMIN — MORPHINE SULFATE 4 MG: 2 INJECTION, SOLUTION INTRAMUSCULAR; INTRAVENOUS at 04:21

## 2018-02-13 RX ADMIN — MORPHINE SULFATE 4 MG: 2 INJECTION, SOLUTION INTRAMUSCULAR; INTRAVENOUS at 00:57

## 2018-02-13 RX ADMIN — OXYCODONE HYDROCHLORIDE 5 MG: 5 TABLET ORAL at 21:01

## 2018-02-13 RX ADMIN — HYOSCYAMINE SULFATE 125 MCG: 0.12 TABLET, ORALLY DISINTEGRATING ORAL at 06:52

## 2018-02-13 RX ADMIN — HYOSCYAMINE SULFATE 125 MCG: 0.12 TABLET, ORALLY DISINTEGRATING ORAL at 18:34

## 2018-02-13 RX ADMIN — OXYCODONE HYDROCHLORIDE 5 MG: 5 TABLET ORAL at 11:46

## 2018-02-13 RX ADMIN — LABETALOL HCL 200 MG: 200 TABLET, FILM COATED ORAL at 12:51

## 2018-02-13 RX ADMIN — MORPHINE SULFATE 4 MG: 2 INJECTION, SOLUTION INTRAMUSCULAR; INTRAVENOUS at 08:18

## 2018-02-13 RX ADMIN — DIATRIZOATE MEGLUMINE AND DIATRIZOATE SODIUM 30 ML: 600; 100 SOLUTION ORAL; RECTAL at 09:38

## 2018-02-13 RX ADMIN — ONDANSETRON 4 MG: 2 INJECTION INTRAMUSCULAR; INTRAVENOUS at 06:44

## 2018-02-13 RX ADMIN — PANTOPRAZOLE SODIUM 40 MG: 40 INJECTION, POWDER, FOR SOLUTION INTRAVENOUS at 12:53

## 2018-02-13 RX ADMIN — METOCLOPRAMIDE 10 MG: 5 INJECTION, SOLUTION INTRAMUSCULAR; INTRAVENOUS at 08:27

## 2018-02-13 RX ADMIN — KETOROLAC TROMETHAMINE 30 MG: 30 INJECTION, SOLUTION INTRAMUSCULAR at 18:26

## 2018-02-13 RX ADMIN — KETOROLAC TROMETHAMINE 30 MG: 30 INJECTION, SOLUTION INTRAMUSCULAR at 00:34

## 2018-02-13 RX ADMIN — Medication 10 ML: at 12:52

## 2018-02-13 RX ADMIN — SODIUM CHLORIDE: 9 INJECTION, SOLUTION INTRAVENOUS at 17:45

## 2018-02-13 RX ADMIN — LABETALOL HCL 200 MG: 200 TABLET, FILM COATED ORAL at 21:01

## 2018-02-13 RX ADMIN — ENOXAPARIN SODIUM 40 MG: 40 INJECTION SUBCUTANEOUS at 12:52

## 2018-02-13 RX ADMIN — DOCUSATE SODIUM 100 MG: 100 CAPSULE ORAL at 12:51

## 2018-02-13 RX ADMIN — KETOROLAC TROMETHAMINE 30 MG: 30 INJECTION, SOLUTION INTRAMUSCULAR at 12:52

## 2018-02-13 RX ADMIN — ENOXAPARIN SODIUM 40 MG: 40 INJECTION SUBCUTANEOUS at 19:34

## 2018-02-13 RX ADMIN — BARIUM SULFATE 60 ML: 0.6 SUSPENSION ORAL at 09:38

## 2018-02-13 RX ADMIN — ONDANSETRON 4 MG: 2 INJECTION INTRAMUSCULAR; INTRAVENOUS at 12:52

## 2018-02-13 RX ADMIN — ONDANSETRON 4 MG: 2 INJECTION INTRAMUSCULAR; INTRAVENOUS at 00:34

## 2018-02-13 RX ADMIN — SODIUM CHLORIDE: 9 INJECTION, SOLUTION INTRAVENOUS at 05:39

## 2018-02-13 RX ADMIN — LEVOTHYROXINE SODIUM 125 MCG: 125 TABLET ORAL at 06:45

## 2018-02-13 RX ADMIN — KETOROLAC TROMETHAMINE 30 MG: 30 INJECTION, SOLUTION INTRAMUSCULAR at 06:47

## 2018-02-13 ASSESSMENT — PAIN DESCRIPTION - ORIENTATION
ORIENTATION: MID

## 2018-02-13 ASSESSMENT — PAIN DESCRIPTION - PAIN TYPE
TYPE: SURGICAL PAIN

## 2018-02-13 ASSESSMENT — PAIN SCALES - GENERAL
PAINLEVEL_OUTOF10: 7
PAINLEVEL_OUTOF10: 8
PAINLEVEL_OUTOF10: 7
PAINLEVEL_OUTOF10: 8
PAINLEVEL_OUTOF10: 7

## 2018-02-13 ASSESSMENT — PAIN DESCRIPTION - DESCRIPTORS
DESCRIPTORS: ACHING;DISCOMFORT

## 2018-02-13 ASSESSMENT — PAIN DESCRIPTION - LOCATION
LOCATION: ABDOMEN

## 2018-02-13 ASSESSMENT — PAIN DESCRIPTION - FREQUENCY: FREQUENCY: INTERMITTENT

## 2018-02-13 NOTE — PROGRESS NOTES
[facility]  Postoperative Bariatric Progress Note    Pt Name: Rito Vernon  Medical Record Number: [id number]  Date of Birth 1990   Today's Date: 2/13/2018    ASSESSMENT   1. POD # 1 S/p robotic assisted sleeve gastrectomy  2. Morbid obesity (BMI 48)  3. Sleep apnea  4. Hypertension  5. Diabetes Mellitus    has a past medical history of Anxiety; Asthma; Chronic back pain; Depression; Diabetes mellitus ([address] GERD (gastroesophageal reflux disease); Headache; Hypertension; Infertility, female; Thyroid disease; Ulcer ([address] and UTI (urinary tract infection). PLAN   1. Upper GI (-) reviewed without evidence of leak or obstruction  2. Okay to advance to sugar free clear liquids. Strict I&Os. 3. IV hydration  4. Chem ACHS with SS insulin as needed  5. Repeat blood pressure home medications   6. Encouraged incentive spirometer   7. Voiding spontaneously   8. DVT & GI prophylaxis  9. Routine incisional care  10. Pain & nausea control  11. Stool softeners  12. Ambulation  13. Labs reviewed. Repeat as needed. 14. Clinically, looks good. If tolerating liquids well and pain controlled then okay for discharge later today. Will check on patient later. SUBJECTIVE   Patient was stable overnight. Chart reviewed. Updated by nursing staff. Denies chest discomfort or dyspnea. No N/V this morning. (+) belching. No flatus or BM. Tolerating Diet NPO Effective Now Exceptions are: Ice Chips, Sips with Meds diet. Pain controlled with Morphine. Up ad sarah. Incisions are clean, dry and intact. Abdominal binder in place.    CURRENT MEDICATIONS   Scheduled Meds:   levothyroxine  125 mcg Oral QAM    sodium chloride flush  10 mL Intravenous 2 times per day    ketorolac  30 mg Intravenous Q6H    ondansetron  4 mg Intravenous Q6H    scopolamine  1 patch Transdermal Q72H    enoxaparin  40 mg Subcutaneous 2 times per day    pantoprazole  40 mg Intravenous Daily    And    sodium chloride (PF)  10 mL Intravenous Daily    insulin lispro  0-6 Units Subcutaneous TID WC    insulin lispro  0-3 Units Subcutaneous Nightly     Continuous Infusions:   sodium chloride 125 mL/hr at 18 0539    dextrose 5 % and 0.9 % NaCl       PRN Meds:.diatrizoate meglumine-sodium, albuterol sulfate HFA, sodium chloride flush, acetaminophen, morphine **OR** morphine, promethazine, promethazine, metoclopramide, hyoscyamine, glucose, dextrose, glucagon (rDNA), dextrose 5 % and 0.9 % NaCl  OBJECTIVE   CURRENT VITALS:  height is 5' 9.5\" (1.765 m) and weight is 335 lb (152 kg) (abnormal). Her oral temperature is 98.4 °F (36.9 °C). Her blood pressure is 128/68 and her pulse is 104. Her respiration is 20 and oxygen saturation is 93%. Temperature Range (24h):Temp: 98.4 °F (36.9 °C) Temp  Av.5 °F (36.4 °C)  Min: 96.8 °F (36 °C)  Max: 98.6 °F (37 °C)  BP Range (42X): Systolic (37XWV), NCS:952 , Min:105 , XLK:202     Diastolic (58RYW), ASC:34, Min:49, Max:129    Pulse Range (24h): Pulse  Av  Min: 61  Max: 104  Respiration Range (24h): Resp  Av.5  Min: 0  Max: 38  Current Pulse Ox (24h):  SpO2: 93 %  Pulse Ox Range (24h):  SpO2  Av.2 %  Min: 82 %  Max: 100 %  Oxygen Amount and Delivery: O2 Flow Rate (L/min): 3 L/min  Incentive Spirometry Tx:   Treatment Tolerance: Other (Comment) (encouraged pt to use)        GENERAL: alert, no distress  LUNGS: clear to ausculation, without wheezes, rales or rhonci  HEART: normal rate and regular rhythm  ABDOMEN: non-distended, soft, incisional tenderness, bowel sounds hypoactive in all 4 quadrants and no guarding or peritoneal signs  INCISION: healing well, no significant drainage, no significant erythema  EXTERMITY: no cyanosis, clubbing or edema  In: 3070.2 [P.O.:220; I.V.:2850.2]  Out: 550 [Urine:550]    Date 18 0000 - 18 0094   Shift 7237-8221 2313-8949 2756-5932 24 Hour Total   I  N  T  A  K  E   P. O. 50 20  70    I.V.  (mL/kg/hr) 983  (0.8) 382.2  1365.2    Shift Total  (mL/kg) 1033  (6.8) 402.2  (2.6)  1435.2  (9.4)   O  U  T  P  U  T   Urine  (mL/kg/hr) 300  (0.2)   300    Shift Total  (mL/kg) 300  (2)   300  (2)   Weight (kg) 152 152 152 152     LABS     Recent Labs      02/12/18   0937  02/13/18   0547   HGB   --   12.4   HCT   --   37.2   NA   --   138   K  4.2  4.3   CL   --   105   CO2   --   19*   BUN   --   8   CREATININE   --   0.7   CALCIUM   --   8.7      RADIOLOGY     PROCEDURE: FL UGI       CLINICAL INFORMATION: Obesity, status post sleeve gastrectomy       TECHNIQUE: A preliminary image of the abdomen was obtained. Following the oral ingestion of dilute Gastrografin and thin barium, the anatomy of the distal esophagus, stomach and duodenum were evaluated in a limited upper GI examination. A total of 12    images were obtained. Total fluoroscopy time 1.0 minutes.       COMPARISON: None       FINDINGS:        Preliminary image demonstrates a nonobstructive bowel gas pattern. There is surgical suture material in the left upper quadrant of the abdomen. Osseous structures are unremarkable.       There is no stenosis or gastroesophageal reflux in the distal esophagus. No large hiatal hernia is identified. There are surgical changes in the stomach of prior sleeve gastrectomy. There is normal transit of contrast through the stomach and into the    duodenum. There is no extravasation of contrast to suggest a leak. The duodenal sweep is normal.           Impression       No evidence of obstruction or leak following sleeve gastrectomy.       Final report electronically signed by Dr. Rudy Hodges on 2/13/2018 10:01 AM         Electronically signed by Rubén Galaviz CNP on 2/13/2018 at 10:47 AM     Patient seen and examined independently by me early this AM. Above discussed and I agree with Fred Lainez CNP. See my additional comments below for updated orders and plan. Labs, cultures, and radiographs where available were reviewed.   I discussed patient concerns with the patient's nurse and instructions were given. Please see our orders for the updated patient care plan. - Upper GI okay. Clear liquids. DVT prophylaxis. Voiding spontaneously. IV fluid hydration. Ambulating. Monitor diabetes and treat as needed with sliding scale insulin. Pain control. Nausea control. Home later today/tomorrow depending how she progresses.     Electronically signed by Frandy Lobo MD on 2/13/2018 at 11:21 AM

## 2018-02-13 NOTE — OP NOTE
was signed and placed  on the chart, the patient was taken back to the operating room and placed  supine on the operating room table. General anesthesia was induced. She  tolerated this well throughout the case. All pressure points were padded. She was on preoperative antibiotics. Bilateral lower extremity sequential  compression devices were placed prior to the incision. Her abdomen and  pelvis were prepped and draped in the usual sterile standard fashion. A  time-out occurred prior to the operation, which not only identified the  patient but also the planned procedure to be performed. At the end of the  time-out, there were no questions or concerns. I began the operation  by making a small transverse incision just above  into the right of the umbilicus with the 09-BUDBW scalpel. Using low-flow  insufflation and the Optiview, the abdomen was entered into without  difficulty. Abdomen was insufflated to a pressure of approximately 15 mmHg  with carbon dioxide gas. The patient tolerated the insufflation well. Laparoscope was then reinserted. Upon initial evaluation, there was no  hollow viscus, solid organ, or major vascular injury with the first trocar  placement. Four separate 8-mm trocars then were placed under direct vision  in the standard location. A 5-mm trocar was placed in the far right  lateral abdominal region under direct vision. Liver retractor was brought  in and placed under direct vision without difficulty. The 11-mm trocar was  then replaced by the 15-mm trocar for the stapler. The patient was placed  in steep reverse Trendelenburg. Robot brought in and docked. Instruments  were placed by myself under direct vision. Once everything was aligned and  in order, I then unscrubbed and went back to the console. I began the operation first by evaluating the hiatus. There appeared to be  no significant hiatal hernia.   The lesser sac was then entered into with  the vessel sealer going through the greater curvature mesentery. This was  then unzipped distally to about 5 to 6 cm from the pylorus. It was then  unzipped proximal up to and around the fundus all way to the hiatus. The  left ke was visualized. Short gastrics were all taken down. Hemostasis  was adequate. Fundus was then completely mobilized and floppy. A 36-Macedonian bougie was then placed under direct vision by Anesthesia and  positioned at the appropriate spot. A 60 Reinforced black cartridge was  then fired distally hugging the bougie, but then ensuring not to encroach  there at the gastric angle distally. Several firings of the [employer] App  purple Reinforced 60 cartridges were then fired next to the bougie heading  more proximal towards the left ke. The last firing ensured to stay close  to the bougie and take off all the excess fundus but also not to encroach  there at GE junction. Gastric remnant was then placed off to the side. Staple line was then tested under irrigation with it being clamped distally  and air insufflation through the bougie. No air bubble was identified. There was no significant/concern for leak. Irrigation was then removed. Bougie was then taken off of suction after the air had been removed and  then bougie itself was removed itself. Evicel was then placed around the  anastomosis to ensure hemostasis and also to give it some added support. An 0 Ethibond was then placed to the gastric remnant and brought up to the  15-mm trocar. No other abnormalities were identified. Instruments were  removed. Robot undocked and I scrubbed back into the case. Gastric  remnant was then removed from the 15-mm trocar site and sent to pathology  for permanent. Using the Storz suture passer and 0 Vicryl suture, I placed  this through-and-through the fascia at the large trocar site under direct  vision. This was done x2.   These were then tied and at the completion of  this there were no fascial

## 2018-02-13 NOTE — CARE COORDINATION
MD  Readmission:   no  Risk Score: 9.75     Discharge Planning  Current Residence:  Private Residence  Living Arrangements:  Spouse/Significant Other   Support Systems:  Spouse/Significant Other, Family Members  Current Services PTA:     Potential Assistance Needed:  N/A  Potential Assistance Purchasing Medications:  No  Does patient want to participate in local refill/ meds to beds program?  No  Type of Home Care Services:  None  Patient expects to be discharged to:  home  Expected Discharge date:  02/14/18  Follow Up Appointment: Best Day/ Time: Tuesday AM    Discharge Plan: Mckenziepranav Hien said that she is going home with her SO at discharge. [facility] nursing visit ordered and I called referral to [facility]. No other discharge needs voiced.       Evaluation: no

## 2018-02-14 ENCOUNTER — TELEPHONE (OUTPATIENT)
Dept: BARIATRICS/WEIGHT MGMT | Age: 28
End: 2018-02-14

## 2018-02-14 VITALS
RESPIRATION RATE: 18 BRPM | WEIGHT: 293 LBS | HEART RATE: 83 BPM | TEMPERATURE: 98.1 F | HEIGHT: 70 IN | BODY MASS INDEX: 41.95 KG/M2 | DIASTOLIC BLOOD PRESSURE: 71 MMHG | OXYGEN SATURATION: 94 % | SYSTOLIC BLOOD PRESSURE: 140 MMHG

## 2018-02-14 LAB
GLUCOSE BLD-MCNC: 103 MG/DL (ref 70–108)
GLUCOSE BLD-MCNC: 89 MG/DL (ref 70–108)

## 2018-02-14 PROCEDURE — 6370000000 HC RX 637 (ALT 250 FOR IP): Performed by: NURSE PRACTITIONER

## 2018-02-14 PROCEDURE — 6370000000 HC RX 637 (ALT 250 FOR IP): Performed by: SURGERY

## 2018-02-14 PROCEDURE — 6360000002 HC RX W HCPCS: Performed by: SURGERY

## 2018-02-14 PROCEDURE — 2580000003 HC RX 258: Performed by: SURGERY

## 2018-02-14 PROCEDURE — 99024 POSTOP FOLLOW-UP VISIT: CPT | Performed by: NURSE PRACTITIONER

## 2018-02-14 PROCEDURE — 82948 REAGENT STRIP/BLOOD GLUCOSE: CPT

## 2018-02-14 RX ORDER — PANTOPRAZOLE SODIUM 40 MG/1
40 TABLET, DELAYED RELEASE ORAL
Status: DISCONTINUED | OUTPATIENT
Start: 2018-02-14 | End: 2018-02-14 | Stop reason: HOSPADM

## 2018-02-14 RX ORDER — OXYCODONE HYDROCHLORIDE 5 MG/1
5 TABLET ORAL EVERY 6 HOURS PRN
Qty: 28 TABLET | Refills: 0 | Status: SHIPPED | OUTPATIENT
Start: 2018-02-14 | End: 2018-02-21

## 2018-02-14 RX ORDER — KETOROLAC TROMETHAMINE 10 MG/1
10 TABLET, FILM COATED ORAL EVERY 8 HOURS PRN
Qty: 20 TABLET | Refills: 0 | Status: ON HOLD | OUTPATIENT
Start: 2018-02-14 | End: 2018-03-06 | Stop reason: HOSPADM

## 2018-02-14 RX ADMIN — KETOROLAC TROMETHAMINE 30 MG: 30 INJECTION, SOLUTION INTRAMUSCULAR at 06:49

## 2018-02-14 RX ADMIN — OXYCODONE HYDROCHLORIDE 5 MG: 5 TABLET ORAL at 05:12

## 2018-02-14 RX ADMIN — DOCUSATE SODIUM 100 MG: 100 CAPSULE ORAL at 08:09

## 2018-02-14 RX ADMIN — LEVOTHYROXINE SODIUM 125 MCG: 125 TABLET ORAL at 06:49

## 2018-02-14 RX ADMIN — LABETALOL HCL 200 MG: 200 TABLET, FILM COATED ORAL at 08:09

## 2018-02-14 RX ADMIN — KETOROLAC TROMETHAMINE 30 MG: 30 INJECTION, SOLUTION INTRAMUSCULAR at 00:55

## 2018-02-14 RX ADMIN — SODIUM CHLORIDE: 9 INJECTION, SOLUTION INTRAVENOUS at 01:47

## 2018-02-14 RX ADMIN — PANTOPRAZOLE SODIUM 40 MG: 40 TABLET, DELAYED RELEASE ORAL at 12:06

## 2018-02-14 RX ADMIN — HYOSCYAMINE SULFATE 125 MCG: 0.12 TABLET, ORALLY DISINTEGRATING ORAL at 00:03

## 2018-02-14 RX ADMIN — ENOXAPARIN SODIUM 40 MG: 40 INJECTION SUBCUTANEOUS at 08:12

## 2018-02-14 RX ADMIN — ONDANSETRON 4 MG: 2 INJECTION INTRAMUSCULAR; INTRAVENOUS at 06:49

## 2018-02-14 RX ADMIN — ONDANSETRON 4 MG: 2 INJECTION INTRAMUSCULAR; INTRAVENOUS at 00:55

## 2018-02-14 ASSESSMENT — PAIN DESCRIPTION - PAIN TYPE
TYPE: SURGICAL PAIN

## 2018-02-14 ASSESSMENT — PAIN DESCRIPTION - DESCRIPTORS
DESCRIPTORS: ACHING
DESCRIPTORS: ACHING;DISCOMFORT

## 2018-02-14 ASSESSMENT — PAIN SCALES - GENERAL
PAINLEVEL_OUTOF10: 7
PAINLEVEL_OUTOF10: 8

## 2018-02-14 ASSESSMENT — PAIN DESCRIPTION - LOCATION
LOCATION: ABDOMEN

## 2018-02-14 ASSESSMENT — PAIN DESCRIPTION - ORIENTATION
ORIENTATION: MID
ORIENTATION: MID

## 2018-02-14 NOTE — TELEPHONE ENCOUNTER
----- Message from Frandy Lobo MD sent at 2/14/2018  2:11 PM EST -----  Ok to take  ----- Message -----  From: Tanner Torres MA  Sent: 2/14/2018   2:09 PM  To: Frandy Lobo MD    Patient was discharged post surgery today. Her mother called and wants to clarify whether patient is to resume taking Effexor. Her discharge papers state she is to stop that med, however mom states you told her that patient could resume taking at home. Please advise.

## 2018-02-14 NOTE — PROGRESS NOTES
Subcutaneous TID WC    insulin lispro  0-3 Units Subcutaneous Nightly     Continuous Infusions:   sodium chloride 125 mL/hr at 18 0147    dextrose 5 % and 0.9 % NaCl       PRN Meds:.diatrizoate meglumine-sodium, oxyCODONE, albuterol sulfate HFA, sodium chloride flush, acetaminophen, morphine **OR** morphine, promethazine, promethazine, metoclopramide, hyoscyamine, glucose, dextrose, glucagon (rDNA), dextrose 5 % and 0.9 % NaCl  OBJECTIVE   CURRENT VITALS:  height is 5' 9.5\" (1.765 m) and weight is 335 lb (152 kg) (abnormal). Her oral temperature is 98.1 °F (36.7 °C). Her blood pressure is 140/71 (abnormal) and her pulse is 83. Her respiration is 18 and oxygen saturation is 94%.    Temperature Range (24h):Temp: 98.1 °F (36.7 °C) Temp  Av.8 °F (37.1 °C)  Min: 98.1 °F (36.7 °C)  Max: 101.2 °F (38.4 °C)  BP Range (67S): Systolic (96OAQ), TJC:519 , Min:131 , VDQ:984     Diastolic (12DEZ), QQM:30, Min:66, Max:72    Pulse Range (24h): Pulse  Av.6  Min: 80  Max: 83  Respiration Range (24h): Resp  Av.4  Min: 16  Max: 20  Current Pulse Ox (24h):  SpO2: 94 %  Pulse Ox Range (24h):  SpO2  Av %  Min: 93 %  Max: 98 %  Oxygen Amount and Delivery: O2 Flow Rate (L/min): 3 L/min  Incentive Spirometry Tx:   Treatment Tolerance: Other (Comment) (encouraged pt to use)        GENERAL: alert, no distress  LUNGS: clear to ausculation, without wheezes, rales or rhonci  HEART: normal rate and regular rhythm  ABDOMEN: non-distended, soft, incisional tenderness, bowel sounds active in all 4 quadrants and no guarding or peritoneal signs  INCISION: healing well, no significant drainage, no significant erythema  EXTERMITY: no cyanosis, clubbing or edema  In: 3129 [P.O.:1060; I.V.:]  Out: 300 [Urine:300]    Date 18 0000 - 18   Shift  24 Hour Total   I  N  T  A  K  E   P.O.  (mL/kg/hr) 100  (0.1)   100    I.V.  (mL/kg) 1152  (7.6)   1152  (7.6)    Shift Total  (mL/kg)

## 2018-02-15 ENCOUNTER — APPOINTMENT (OUTPATIENT)
Dept: CT IMAGING | Age: 28
End: 2018-02-15
Payer: COMMERCIAL

## 2018-02-15 ENCOUNTER — TELEPHONE (OUTPATIENT)
Dept: BARIATRICS/WEIGHT MGMT | Age: 28
End: 2018-02-15

## 2018-02-15 ENCOUNTER — HOSPITAL ENCOUNTER (EMERGENCY)
Age: 28
Discharge: HOME | End: 2018-02-15
Attending: EMERGENCY MEDICINE
Payer: COMMERCIAL

## 2018-02-15 DIAGNOSIS — S36.029A SPLEEN HEMATOMA, INITIAL ENCOUNTER: ICD-10-CM

## 2018-02-15 DIAGNOSIS — R50.9 FEVER, UNSPECIFIED FEVER CAUSE: Primary | ICD-10-CM

## 2018-02-15 DIAGNOSIS — J18.9 PNEUMONIA DUE TO ORGANISM: ICD-10-CM

## 2018-02-15 LAB
ALBUMIN SERPL-MCNC: 2.9 GM/DL (ref 3.4–5)
ALP BLD-CCNC: 62 U/L (ref 46–116)
ALT SERPL-CCNC: 17 U/L (ref 14–63)
AMORPHOUS: ABNORMAL
ANION GAP: 12 MEQ/L (ref 8–16)
AST SERPL-CCNC: 15 U/L (ref 15–37)
BACTERIA: ABNORMAL
BASOPHILS # BLD: 0.6 % (ref 0–3)
BILIRUB SERPL-MCNC: 0.7 MG/DL (ref 0.2–1)
BILIRUBIN URINE: ABNORMAL
BLOOD, URINE: ABNORMAL
BUN BLDV-MCNC: 9 MG/DL (ref 7–18)
CASTS UA: ABNORMAL /LPF
CHARACTER, URINE: CLEAR
CHLORIDE BLD-SCNC: 104 MEQ/L (ref 98–107)
CO2: 24 MEQ/L (ref 21–32)
COLOR: ABNORMAL
CREAT SERPL-MCNC: 0.8 MG/DL (ref 0.6–1.3)
CRYSTALS, UA: ABNORMAL
EOSINOPHILS RELATIVE PERCENT: 2.7 % (ref 0–4)
EPITHELIAL CELLS, UA: ABNORMAL /HPF
FLU A ANTIGEN: NEGATIVE
FLU B ANTIGEN: NEGATIVE
GFR, ESTIMATED: 90 ML/MIN/1.73M2
GLUCOSE BLD-MCNC: 87 MG/DL (ref 74–106)
GLUCOSE, URINE: NEGATIVE MG/DL
HCT VFR BLD CALC: 34.3 % (ref 37–47)
HEMOGLOBIN: 11.7 GM/DL (ref 12–16)
ICTOTEST: NEGATIVE
KETONES, URINE: >= 80
LEUKOCYTE ESTERASE, URINE: NEGATIVE
LYMPHOCYTES # BLD: 18.3 % (ref 15–47)
MCH RBC QN AUTO: 31.1 PG (ref 27–31)
MCHC RBC AUTO-ENTMCNC: 34.1 GM/DL (ref 33–37)
MCV RBC AUTO: 91.2 FL (ref 81–99)
MONOCYTES: 10.3 % (ref 0–12)
MUCUS: ABNORMAL
NITRITE, URINE: NEGATIVE
PDW BLD-RTO: 11.9 % (ref 11.5–14.5)
PH UA: 6 (ref 5–9)
PLATELET # BLD: 204 THOU/MM3 (ref 130–400)
PMV BLD AUTO: 8.8 FL (ref 7.4–10.4)
POC CALCIUM: 9 MG/DL (ref 8.5–10.1)
POTASSIUM SERPL-SCNC: 3.3 MEQ/L (ref 3.5–5.1)
PROTEIN UA: 30 MG/DL
RBC # BLD: 3.76 MILL/MM3 (ref 4.2–5.4)
RBC UA: ABNORMAL /HPF
REFLEX TO URINE C & S: ABNORMAL
SEGS: 68.1 % (ref 43–75)
SODIUM BLD-SCNC: 140 MEQ/L (ref 136–145)
SPECIFIC GRAVITY UA: < 1.005 (ref 1–1.03)
TOTAL PROTEIN: 7.2 GM/DL (ref 6.4–8.2)
UROBILINOGEN, URINE: 0.2 EU/DL (ref 0–1)
WBC # BLD: 11.1 THOU/MM3 (ref 4.8–10.8)
WBC UA: ABNORMAL /HPF

## 2018-02-15 PROCEDURE — 2580000003 HC RX 258: Performed by: EMERGENCY MEDICINE

## 2018-02-15 PROCEDURE — 81001 URINALYSIS AUTO W/SCOPE: CPT

## 2018-02-15 PROCEDURE — 80053 COMPREHEN METABOLIC PANEL: CPT

## 2018-02-15 PROCEDURE — 6360000004 HC RX CONTRAST MEDICATION: Performed by: EMERGENCY MEDICINE

## 2018-02-15 PROCEDURE — 87804 INFLUENZA ASSAY W/OPTIC: CPT

## 2018-02-15 PROCEDURE — 87040 BLOOD CULTURE FOR BACTERIA: CPT

## 2018-02-15 PROCEDURE — 96367 TX/PROPH/DG ADDL SEQ IV INF: CPT

## 2018-02-15 PROCEDURE — 96365 THER/PROPH/DIAG IV INF INIT: CPT

## 2018-02-15 PROCEDURE — 85025 COMPLETE CBC W/AUTO DIFF WBC: CPT

## 2018-02-15 PROCEDURE — 99284 EMERGENCY DEPT VISIT MOD MDM: CPT

## 2018-02-15 PROCEDURE — 74177 CT ABD & PELVIS W/CONTRAST: CPT

## 2018-02-15 PROCEDURE — 6360000002 HC RX W HCPCS: Performed by: EMERGENCY MEDICINE

## 2018-02-15 PROCEDURE — 36415 COLL VENOUS BLD VENIPUNCTURE: CPT

## 2018-02-15 PROCEDURE — 71275 CT ANGIOGRAPHY CHEST: CPT

## 2018-02-15 RX ORDER — 0.9 % SODIUM CHLORIDE 0.9 %
1000 INTRAVENOUS SOLUTION INTRAVENOUS ONCE
Status: COMPLETED | OUTPATIENT
Start: 2018-02-15 | End: 2018-02-15

## 2018-02-15 RX ORDER — LEVOFLOXACIN 500 MG/1
500 TABLET, FILM COATED ORAL DAILY
Qty: 10 TABLET | Refills: 0 | Status: ON HOLD | OUTPATIENT
Start: 2018-02-15 | End: 2018-03-06 | Stop reason: HOSPADM

## 2018-02-15 RX ORDER — LEVOFLOXACIN 5 MG/ML
500 INJECTION, SOLUTION INTRAVENOUS ONCE
Status: COMPLETED | OUTPATIENT
Start: 2018-02-15 | End: 2018-02-15

## 2018-02-15 RX ADMIN — WATER 1 G: 1 INJECTION INTRAMUSCULAR; INTRAVENOUS; SUBCUTANEOUS at 22:28

## 2018-02-15 RX ADMIN — IOHEXOL 50 ML: 240 INJECTION, SOLUTION INTRATHECAL; INTRAVASCULAR; INTRAVENOUS; ORAL at 20:49

## 2018-02-15 RX ADMIN — LEVOFLOXACIN 500 MG: 5 INJECTION, SOLUTION INTRAVENOUS at 22:29

## 2018-02-15 RX ADMIN — IOPAMIDOL 100 ML: 755 INJECTION, SOLUTION INTRAVENOUS at 20:48

## 2018-02-15 RX ADMIN — SODIUM CHLORIDE 1000 ML: 9 INJECTION, SOLUTION INTRAVENOUS at 19:57

## 2018-02-15 ASSESSMENT — PAIN DESCRIPTION - LOCATION: LOCATION: ABDOMEN

## 2018-02-15 ASSESSMENT — ENCOUNTER SYMPTOMS
WHEEZING: 0
SHORTNESS OF BREATH: 0
BLOOD IN STOOL: 0
ABDOMINAL PAIN: 1
EYE PAIN: 0
NAUSEA: 0
EYE DISCHARGE: 0
DIARRHEA: 0
VOMITING: 0

## 2018-02-15 ASSESSMENT — PAIN DESCRIPTION - PAIN TYPE: TYPE: ACUTE PAIN

## 2018-02-15 ASSESSMENT — PAIN SCALES - GENERAL: PAINLEVEL_OUTOF10: 5

## 2018-02-15 NOTE — TELEPHONE ENCOUNTER
----- Message from NYDIA Maya sent at 2/15/2018 11:10 AM EST -----  Contact: [phone]  Advised to continue monitoring temperature- if increases, let us know. If other symptoms come up, let us know.  ----- Message -----  From: Carissa Miles MA  Sent: 2/15/2018   9:30 AM  To: NYDIA Maya    Patient's mother called stating patient has chills this morning and running fever of 99.9 degrees. Wants to know if this is anything to be concerned about. Please advise.

## 2018-02-15 NOTE — PROGRESS NOTES
Discharge teaching done with pt and son who verbalized understanding. Left floor via wheelchair en route to home with transport.

## 2018-02-15 NOTE — DISCHARGE SUMMARY
02/12/2018  PROVIDER:     Kaycee Hughes M.D.     DATE OF PROCEDURE:  02/12/2018     PREOPERATIVE DIAGNOSES:  1.  Morbid obesity. 2.  BMI 48.  3.  Sleep apnea. 4.  Diabetes mellitus. 5.  Hypertension.     POSTOPERATIVE DIAGNOSES:  1.  Morbid obesity. 2.  BMI 48.  3.  Sleep apnea. 4.  Diabetes mellitus. 5.  Hypertension.     PROCEDURE:  Robotic sleeve gastrectomy.     SURGEON:  Kaycee Hughes M.D.     ASSISTANT:  Darius Dove. Lau, [facility]     ANESTHESIA:  General/local.     ESTIMATED BLOOD LOSS:  10 mL.     DRAINS:  None.     COMPLICATIONS:  None.     DISPOSITION:  Stable to the recovery room.     INDICATIONS:  The patient is a 68-year-old female who I had been seen in  the weight management program several times secondary to her morbid  obesity. She was not able to lose enough adequate excess body weight with  medical management only and is wishing to proceed with a robotic sleeve  gastrectomy. Risks of surgery were then further discussed. Some of the  risks included but were not limited to bleeding, infection, the need for  reoperation, severe chronic postoperative pain or numbness, major vascular  or nerve injury, cardiopulmonary complications, anesthetic complications,  seroma/hematoma formation, wound breakdown, trocar site herniation, staple  line breakdown/leak, staple line bleed, stricture, chronic pain, chronic  nausea, and death. After all of the questions were answered in their  entirety and the patient was completely aware of the current situation, she  elected to proceed with the procedure.     DESCRIPTION OF THE PROCEDURE:  After informed consent was signed and placed  on the chart, the patient was taken back to the operating room and placed  supine on the operating room table. General anesthesia was induced. She  tolerated this well throughout the case. All pressure points were padded. She was on preoperative antibiotics.   Bilateral lower extremity sequential  compression devices were the injection of local anesthetic without difficulty. Sponge, needle, and instrumentation count was correct at the end of the  procedure. The patient tolerated the procedure well with no apparent  complications and only about 5 to 10 mL of blood loss. She was easily  brought out of general anesthesia and transferred to the postanesthesia  care unit in stable condition.     PROCEDURE: FL UGI       CLINICAL INFORMATION: Obesity, status post sleeve gastrectomy       TECHNIQUE: A preliminary image of the abdomen was obtained. Following the oral ingestion of dilute Gastrografin and thin barium, the anatomy of the distal esophagus, stomach and duodenum were evaluated in a limited upper GI examination. A total of 12    images were obtained. Total fluoroscopy time 1.0 minutes.       COMPARISON: None       FINDINGS:        Preliminary image demonstrates a nonobstructive bowel gas pattern. There is surgical suture material in the left upper quadrant of the abdomen. Osseous structures are unremarkable.       There is no stenosis or gastroesophageal reflux in the distal esophagus. No large hiatal hernia is identified. There are surgical changes in the stomach of prior sleeve gastrectomy. There is normal transit of contrast through the stomach and into the    duodenum. There is no extravasation of contrast to suggest a leak. The duodenal sweep is normal.           Impression       No evidence of obstruction or leak following sleeve gastrectomy.       Final report electronically signed by Dr. Madeline Estes on 2/13/2018 10:01 AM       Hospital Course: Derrell Hardy is a 77-year-old female patient who presented to the weight management program for her morbid obesity, BMI 48. She decided to electively undergo robotic assisted sleeve gastrectomy with Dr. Mahin Rehman on 2/12/18.  She was admitted to [facility] for postoperative care and was initially managed with bowel rest, analgesics for pain control, IV fluid hydration, GI and DVT medications. You may also experience constipation which can be relieved with stool softners or laxatives. [x]You may resume your daily prescription medication schedule unless otherwise specified. [x]Do not take 325mg Aspirin or other blood thinners such as Coumadin or Plavix for 5 days. Do not resume your diabetic medications unless your blood glucose level is consistently greater than 200 mg/dL. Please make an appointment to see the physician who manages your diabetes. Take the Zofran exactly as prescribed to control nausea and vomiting. Take the Reglan exactly as prescribed to control nausea and vomiting. Take the Lovenox exactly as prescribed. Use Colace and MiraLAX as prescribed to prevent constipation. Do not allow yourself to become constipated. Drink at least 64 ounces of liquids per day. If constipated despite adequate fluid intake and no results with Colace and MiraLAX, may use magnesium citrate 1 bottle as needed. Poor over ice and sip at slowly over several hours. WOUND/DRESSING INSTRUCTIONS:  Always ensure you and your care giver clean hands before and after caring for the wound. [] Keep dressing clean and dry for 48 hours. Change when soiled or wet. [x] Allow steri-strips to fall off on their own. [x] Ice operative site for 20 minutes 4 times a day as needed. [x] May wash over incision in shower, but do not soak in a bath.  [] Take sitz bath for 20 minutes twice daily and after bowel movements. [x] Keep the abdominal binder in place during the day. May remove to shower and at night. ABDOMINAL/LAPAROSCOPIC SURGERY  [x]You are encouraged to get up and move around as this helps with circulation, prevents blood clots from forming and speeds up the healing process. Call the office if you develop pain or swelling in your legs. Do not massage sore muscles in the legs. [x]Breath deeply and cough from time to time.  This helps to clear your lungs and helps prevent oxyCODONE 5 MG immediate release tablet  Commonly known as:  ROXICODONE  Take 1 tablet by mouth every 6 hours as needed for Pain for up to 7 days.         CHANGE how you take these medications    omeprazole 40 MG delayed release capsule  Commonly known as:  PRILOSEC  Take 1 capsule by mouth daily Open capsule and take in liquid  What changed:  · medication strength  · how much to take  · additional instructions        CONTINUE taking these medications    EPIPEN IM     FLAXSEED OIL PO     labetalol 100 MG tablet  Commonly known as:  NORMODYNE     levothyroxine 125 MCG tablet  Commonly known as:  SYNTHROID     Lysine 500 MG Tabs     Melatonin 10 MG Tabs     OS-ADIN 500 + D PO     PRE- PO     PROVENTIL HFA IN     SPRINTEC 28 PO        STOP taking these medications    aspirin 81 MG tablet     metFORMIN 500 MG tablet  Commonly known as:  GLUCOPHAGE     venlafaxine 75 MG extended release capsule  Commonly known as:  EFFEXOR XR           Where to Get Your Medications      These medications were sent to [facility] [phone]  [address] [id number]    Phone:  [phone]   · Docusate Sodium 100 MG Tabs  · enoxaparin 40 MG/0.4ML injection  · ketorolac 10 MG tablet  · metoclopramide 10 MG tablet  · omeprazole 40 MG delayed release capsule  · ondansetron 4 MG tablet  · oxyCODONE 5 MG immediate release tablet       Diet: Clear Liquid diet as tolerated    Activity: no lifting more than 10-20 lbs for next two weeks    Wound Care: as directed     Follow-up:  in 7 days with Caitlin Ward MD  Follow up: in weight management in 1 week     Aristeo Redmond CNP   Electronincally signed 2/15/2018 at 2:04 PM

## 2018-02-16 ENCOUNTER — TELEPHONE (OUTPATIENT)
Dept: BARIATRICS/WEIGHT MGMT | Age: 28
End: 2018-02-16

## 2018-02-16 VITALS
DIASTOLIC BLOOD PRESSURE: 78 MMHG | BODY MASS INDEX: 43.4 KG/M2 | RESPIRATION RATE: 18 BRPM | HEIGHT: 69 IN | TEMPERATURE: 99.9 F | OXYGEN SATURATION: 96 % | WEIGHT: 293 LBS | HEART RATE: 78 BPM | SYSTOLIC BLOOD PRESSURE: 147 MMHG

## 2018-02-16 NOTE — TELEPHONE ENCOUNTER
Lula's mom calls \"Lula has a temp of 101.9\"  I asked if she had taken any liquid tylenol \"no-can she take that? \"  Yes- she can take liquid tylenol to reduce her fever. Pt received IV antibiotic(Patient received IV fluids, Rocephin and Levaquin IV.)   in ED last evening . Mother asked if she can take generic brand of tylenol \"yes\". I asked if pt had used her IS today \"no\". Advised patient to use IS four times daily (schedule twice before noon and twice after noon). Asked mother if there were any other concerns-\"no\".

## 2018-02-16 NOTE — ED PROVIDER NOTES
[facility]  eMERGENCY dEPARTMENT eNCOUnter          [address] [facility]       Chief Complaint   Patient presents with    Fever       Nurses Notes reviewed and I agree except as noted in the HPI. HISTORY OF PRESENT ILLNESS    Brenda Dockery is a 25 y.o. female who presented via private vehicle with the chief complaint mentioned above. She is status post laparoscopic gastric sleeve surgery by Dr. Cristina Morgan  3 days ago. She was discharged from the hospital yesterday. She presented with intermittent fever and chills since yesterday. Her abdominal pain has not changed. She has no shortness of breath or cough. She has no dysuria, hematuria. REVIEW OF SYSTEMS     Review of Systems   Constitutional: Positive for chills and fever. Eyes: Negative for pain and discharge. Respiratory: Negative for shortness of breath and wheezing. Cardiovascular: Negative for chest pain and palpitations. Gastrointestinal: Positive for abdominal pain. Negative for blood in stool, diarrhea, nausea and vomiting. Genitourinary: Negative for dysuria and hematuria. Musculoskeletal: Negative for neck pain and neck stiffness. Neurological: Negative for seizures, syncope and headaches. Psychiatric/Behavioral: Negative for confusion. PAST MEDICAL HISTORY    has a past medical history of Anxiety; Asthma; Chronic back pain; Depression; Diabetes mellitus ([address] GERD (gastroesophageal reflux disease); Headache; Hypertension; Infertility, female; Thyroid disease; Ulcer ([address] and UTI (urinary tract infection). SURGICAL HISTORY      has a past surgical history that includes Upper gastrointestinal endoscopy (2015?); Breast surgery (Right, 2016); Colonoscopy (2015?); Upper gastrointestinal endoscopy (2017); and lap,stomach,other,w/o tube (N/A, 2/12/2018).     CURRENT MEDICATIONS       Previous Medications    ALBUTEROL SULFATE (PROVENTIL HFA IN)    Inhale 2 puffs into the lungs every 4-6 hours as CULTURE - Abnormal; Notable for the following:     Bilirubin Urine SMALL (*)     Blood, Urine LARGE (*)     Protein, UA 30 (*)     Color, UA DARK YELLOW (*)     All other components within normal limits   COMPREHENSIVE METABOLIC PANEL - Abnormal; Notable for the following:     Potassium 3.3 (*)     Alb 2.9 (*)     All other components within normal limits   RAPID INFLUENZA A/B ANTIGENS   CULTURE BLOOD #1   CULTURE BLOOD #2   GLOMERULAR FILTRATION RATE, ESTIMATED   ANION GAP   ICTOTEST, URINE       EMERGENCY DEPARTMENT COURSE:   Vitals:    Vitals:    02/15/18 1918 02/15/18 2000 02/15/18 2153   BP: (!) 160/75 (!) 160/85 (!) 157/88   Pulse: 96 85 87   Resp: 20 20 20   Temp: 98.3 °F (36.8 °C) 98.7 °F (37.1 °C) 98.9 °F (37.2 °C)   TempSrc: Oral Oral Oral   SpO2: 97% 96% 95%   Weight: (!) 335 lb (152 kg)     Height: 5' 9\" (1.753 m)       Patient received IV fluids, Rocephin and Levaquin IV. She remained clinically stable. She had no respiratory distress. She wanted to be treated as an outpatient    CONSULTS:  I discussed the case, including CT findings with Dr. Taina Mack said he feels comfortable seeing the patient in the office in 4 days. FINAL IMPRESSION      1. Fever, unspecified fever cause    2. Pneumonia due to organism    3. Spleen hematoma, initial encounter          DISPOSITION/PLAN   She was discharged home in stable condition, advised to return if worse or new symptoms.     PATIENT REFERRED TO:  Aldo Hobson MD  [address]  [phone]    In 4 days        DISCHARGE MEDICATIONS:  New Prescriptions    LEVOFLOXACIN (LEVAQUIN) 500 MG TABLET    Take 1 tablet by mouth daily for 10 days       (Please note that portions of this note were completed with a voice recognition program.  Efforts were made to edit the dictations but occasionally words are mis-transcribed.)    Sherial Klinefelter, MD Sherial Klinefelter, MD  02/16/18 0035

## 2018-02-19 ENCOUNTER — OFFICE VISIT (OUTPATIENT)
Dept: BARIATRICS/WEIGHT MGMT | Age: 28
End: 2018-02-19

## 2018-02-19 VITALS
SYSTOLIC BLOOD PRESSURE: 145 MMHG | WEIGHT: 293 LBS | BODY MASS INDEX: 43.4 KG/M2 | TEMPERATURE: 97.7 F | DIASTOLIC BLOOD PRESSURE: 75 MMHG | RESPIRATION RATE: 18 BRPM | HEIGHT: 69 IN | HEART RATE: 91 BPM

## 2018-02-19 DIAGNOSIS — L25.8 CONTACT DERMATITIS DUE TO OTHER AGENT, UNSPECIFIED CONTACT DERMATITIS TYPE: ICD-10-CM

## 2018-02-19 DIAGNOSIS — E66.01 MORBID OBESITY WITH BMI OF 45.0-49.9, ADULT (HCC): Primary | ICD-10-CM

## 2018-02-19 DIAGNOSIS — Z98.84 S/P LAPAROSCOPIC SLEEVE GASTRECTOMY: ICD-10-CM

## 2018-02-19 PROCEDURE — 99024 POSTOP FOLLOW-UP VISIT: CPT | Performed by: PHYSICIAN ASSISTANT

## 2018-02-19 NOTE — PROGRESS NOTES
[facility]  [facility]  [address] , [address]  [address] [address]  Dept: [phone]  Loc: [phone]      Visit Date:  2/19/2018  Weight Management Postop Follow-up    HPI:      Marilou Rubalcava is a 25 y.o. female who is here today for 1 weeks follow up since  robotic-assisted sleeve gastrectomy performed by Dr. Josh García  on 2/12/18. Doing better today. She went to the ER over the weekend and received IV fluids and dx with pneumonia. Started antibiotics. Denies any cough or shortness of breath. No fever since going to the ER. Using incentive spirometer. No abdominal pain. Pt reports that she is drinking 30 oz of fluid and 46 grams of protein daily. Gave patient a 2 oz cup of water and kristian to tolerate all at once. Feels that this will help her get fluids and protein shakes in. Sleeping 11-12 hours per night. Advised to not go more than 8 hours without drinking. Set an alarm. No issue with bowel movements. No nausea- as long as she is taking nausea meds. No emesis in last few days. Denies heartburn/ GERD - continues to take PPI. Denies sx of dehydration. Denies CP/SOB. No Abdominal pain. No incisional discomfort. C/o rash to abdominal area- has been using hydrocortisone cream and taking benadryl with benefit. Taking 2 calcium and 1-2 MV and B12 daily. Struggling with the taste of vitamins. Off all pain meds. Taking Lovenox, as rx. Total weight loss since surgery is 15 pounds. Physical Activity:  walking  Days per week: daily  Time per session: 30 minutes    Current BMI: Body mass index is 47.58 kg/m².   Current Weight:   Wt Readings from Last 3 Encounters:   02/19/18 (!) 322 lb 3.2 oz (146.1 kg)   02/15/18 (!) 335 lb (152 kg)   02/12/18 (!) 335 lb (152 kg)     Pre-op Body Weight : 337      Past Medical History:  Past Medical History:   Diagnosis Date    Anxiety     Asthma     Chronic back pain     Depression     Diabetes mellitus (HCC)     GERD (gastroesophageal reflux disease)     Headache     Hypertension     Infertility, female     Thyroid disease     Ulcer ([address]     UTI (urinary tract infection)        Past Surgical History:  Past Surgical History:   Procedure Laterality Date    BREAST SURGERY Right 2016    biopsy    COLONOSCOPY  2015? Dr. Juan Ni LAP,STOMACH,OTHER,W/O TUBE N/A 2/12/2018    GASTRECTOMY SLEEVE LAPAROSCOPIC ROBOTIC performed by Delicia Trinh MD at [facility]  2015?     Dr. Alexis Siddiqi  2017    Dr. Jarad Gutierres       Past Social History:  Social History     Social History    Marital status: Single     Spouse name: N/A    Number of children: N/A    Years of education: 12     Occupational 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[employer]     Social History Main Topics    Smoking status: Former Smoker     Packs/day: 0.50     Types: Cigarettes     Quit date: 5/18/2014    Smokeless tobacco: Never Used    Alcohol use No    Drug use: No    Sexual activity: Yes     Partners: Male     Other Topics Concern    Not on file     Social History Narrative    No narrative on file        Medications:   Current Outpatient Prescriptions   Medication Sig Dispense Refill    levofloxacin (LEVAQUIN) 500 MG tablet Take 1 tablet by mouth daily for 10 days 10 tablet 0    metoclopramide (REGLAN) 10 MG tablet Take 1 tablet by mouth every 6 hours as needed (nausea/vomiting) 30 tablet 0    enoxaparin (LOVENOX) 40 MG/0.4ML injection Inject 0.4 mLs into the skin daily for 14 days 14 Syringe 0    Docusate Sodium 100 MG TABS Take 100 mg by mouth 2 times daily Take as needed to prevent constipation 30 tablet 1    omeprazole (PRILOSEC) 40 MG delayed release capsule Take 1 capsule by mouth daily Open capsule and take in liquid 30 capsule 2    ondansetron (ZOFRAN) 4 MG tablet Take 1 tablet by mouth every 4 hours as needed for Nausea or Vomiting 30 tablet 0    levothyroxine (SYNTHROID) 125 MCG tablet Take 125 mcg by mouth every morning      Melatonin 10 MG TABS Take 1 tablet by mouth nightly      Norgestimate-Eth Estradiol (SPRINTEC 28 PO) Take by mouth daily       Calcium Carbonate-Vitamin D (OS-ADIN 500 + D PO) Take by mouth 2 times daily      labetalol (NORMODYNE) 100 MG tablet 200 mg 2 times daily       Albuterol Sulfate (PROVENTIL HFA IN) Inhale 2 puffs into the lungs every 4-6 hours as needed       EPINEPHrine HCl, Anaphylaxis, (EPIPEN IM) Inject into the muscle      oxyCODONE (ROXICODONE) 5 MG immediate release tablet Take 1 tablet by mouth every 6 hours as needed for Pain for up to 7 days. 28 tablet 0    ketorolac (TORADOL) 10 MG tablet Take 1 tablet by mouth every 8 hours as needed for Pain 20 tablet 0    Prenatal Multivit-Min-Fe-FA (PRE-CHANTEL PO) Take 1 tablet by mouth daily      Flaxseed, Linseed, (FLAXSEED OIL PO) Take 1,500 mg by mouth every morning      Lysine 500 MG TABS Take 500 mg by mouth nightly       No current facility-administered medications for this visit. Allergies: Allergies   Allergen Reactions    Bee Venom Swelling    Bactrim [Sulfamethoxazole-Trimethoprim] Hives    Mirapex [Pramipexole] Other (See Comments)     Leg cramps    Oxycodone Rash    Requip [Ropinirole] Nausea And Vomiting       Subjective:    Review of Systems:  Constitutional: Denies any fever, chills,(+) fatigue. Wound: Denies any rash, skin color changes or wound problems. Resp: Denies any cough, shortness of breath. CV: Denies any chest pain, orthopnea or syncope. MS: Denies myalgias, arthralgias. GI: Denies any nausea, vomiting, diarrhea, constipation, melena, hematochezia. MInimal incisional discomfort. : Denies any hematuria, hesitancy or dysuria. NEURO: Denies seizures, headache.   Objective:    BP (!) 145/75 (Site: Right Arm, Position: Sitting, Cuff Size: Large Adult)   Pulse 91   Temp 97.7 °F (36.5 °C) (Oral)   Resp 18   Ht 5' 9\" (1.753 m)   Wt (!) VITD25 32 03/31/2017        VITAMIN B1/ THIAMINE   Lab Results   Component Value Date    NBWQ6PZIQOD 113 03/31/2017        RBC FOLATE   Lab Results   Component Value Date    FOLATE > 20.0 03/31/2017        LIPID SCREEN (FASTING)   Lab Results   Component Value Date    CHOL 184 03/31/2017    TRIG 200 03/31/2017    HDL 53 03/31/2017    LDLCALC 91 03/31/2017   ,     HGA1C (T2DM ONLY)   No results found for: LABA1C, AVGG     TSH   Lab Results   Component Value Date    TSH 1.500 03/31/2017        IRON   Lab Results   Component Value Date    IRON 88 03/31/2017        IONIZED CALCIUM   No results found for: DAVIS GALLEGOS      Assessment:      1. Morbid obesity with BMI of 45.0-49.9, adult ([address]     2. S/P laparoscopic sleeve gastrectomy     3. Contact dermatitis due to other agent, unspecified contact dermatitis type       Plan:    Stay well hydrated. Drink a minimum of 64 oz of non-carbonated, non-caffeinated fluids daily. Nutritional education occurred during visit. Tolerating diet. Continue following  with dietitian and follow their recommendations as  directed. Continue  60-80  grams of protein each day. Signs and symptoms reviewed with patient that would be concerning and need her to return to office for re-evaluation. Patient will call if any questions or  concerns arrise. No lifting, pushing, pulling over 20#  No abdominal exercises  Wear abdominal binder prn  Complete Lovenox as rx  Importance of physical activity discussed with patient. Advised to increase activity as tolerated. Take Multivitamin, Calcium and B12 as directed- may blend vitamins into protein shake. Continue PPI for at least 3 months post-op. Encouraged to attend support groups  May start soft foods over weekend- yogurt, cottage cheese, mashed potatoes, applesauce  Follow-up in 1 week with provider and dietitian  Continue hydrocortisone cream and Benadryl prn for contact dermatitis. May use cold compress to decrease itching.    2 oz cup given. Use prn. Can drink 2 oz every 15 minutes. Advised to not go longer than 8 hours without drinking. Use incentive spirometer 10 times daily. Return in about 1 week (around 2/26/2018) for postop follow up. I spent over 15 minutes with the patient, with greater that 50% of that time spent on face counseling for nutrition and exercise.     Electronically signed by NYDIA Black on 2/19/2018 at 4:13 PM

## 2018-02-20 ENCOUNTER — TELEPHONE (OUTPATIENT)
Dept: BARIATRICS/WEIGHT MGMT | Age: 28
End: 2018-02-20

## 2018-02-21 ENCOUNTER — APPOINTMENT (OUTPATIENT)
Dept: GENERAL RADIOLOGY | Age: 28
DRG: 711 | End: 2018-02-21
Payer: COMMERCIAL

## 2018-02-21 ENCOUNTER — HOSPITAL ENCOUNTER (EMERGENCY)
Age: 28
Discharge: HOME | DRG: 711 | End: 2018-02-21
Attending: EMERGENCY MEDICINE
Payer: COMMERCIAL

## 2018-02-21 ENCOUNTER — TELEPHONE (OUTPATIENT)
Dept: BARIATRICS/WEIGHT MGMT | Age: 28
End: 2018-02-21

## 2018-02-21 VITALS
OXYGEN SATURATION: 95 % | RESPIRATION RATE: 24 BRPM | HEART RATE: 97 BPM | DIASTOLIC BLOOD PRESSURE: 91 MMHG | WEIGHT: 293 LBS | BODY MASS INDEX: 41.95 KG/M2 | TEMPERATURE: 100.3 F | HEIGHT: 70 IN | SYSTOLIC BLOOD PRESSURE: 134 MMHG

## 2018-02-21 DIAGNOSIS — K91.2 POSTOPERATIVE INTESTINAL MALABSORPTION: ICD-10-CM

## 2018-02-21 DIAGNOSIS — E11.8 TYPE 2 DIABETES MELLITUS WITH COMPLICATION, WITHOUT LONG-TERM CURRENT USE OF INSULIN (HCC): ICD-10-CM

## 2018-02-21 DIAGNOSIS — Z98.84 S/P LAPAROSCOPIC SLEEVE GASTRECTOMY: Primary | ICD-10-CM

## 2018-02-21 DIAGNOSIS — E86.0 DEHYDRATION: ICD-10-CM

## 2018-02-21 DIAGNOSIS — Z98.84 S/P LAPAROSCOPIC SLEEVE GASTRECTOMY: ICD-10-CM

## 2018-02-21 DIAGNOSIS — E66.01 MORBID OBESITY WITH BMI OF 45.0-49.9, ADULT (HCC): ICD-10-CM

## 2018-02-21 DIAGNOSIS — E86.0 DEHYDRATION: Primary | ICD-10-CM

## 2018-02-21 DIAGNOSIS — E86.0 DEHYDRATION, MILD: Primary | ICD-10-CM

## 2018-02-21 LAB
ANION GAP SERPL CALCULATED.3IONS-SCNC: 14 MEQ/L (ref 8–16)
BASOPHILS # BLD: 0.8 %
BASOPHILS ABSOLUTE: 0 THOU/MM3 (ref 0–0.1)
BUN BLDV-MCNC: 9 MG/DL (ref 7–22)
CALCIUM SERPL-MCNC: 9.3 MG/DL (ref 8.5–10.5)
CHLORIDE BLD-SCNC: 100 MEQ/L (ref 98–111)
CO2: 25 MEQ/L (ref 23–33)
CREAT SERPL-MCNC: 0.8 MG/DL (ref 0.4–1.2)
EKG ATRIAL RATE: 101 BPM
EKG P AXIS: 7 DEGREES
EKG P-R INTERVAL: 172 MS
EKG Q-T INTERVAL: 332 MS
EKG QRS DURATION: 94 MS
EKG QTC CALCULATION (BAZETT): 430 MS
EKG R AXIS: 56 DEGREES
EKG T AXIS: 2 DEGREES
EKG VENTRICULAR RATE: 101 BPM
EOSINOPHIL # BLD: 0.4 %
EOSINOPHILS ABSOLUTE: 0 THOU/MM3 (ref 0–0.4)
GFR SERPL CREATININE-BSD FRML MDRD: 84 ML/MIN/1.73M2
GLUCOSE BLD-MCNC: 115 MG/DL (ref 70–108)
HCT VFR BLD CALC: 40.1 % (ref 37–47)
HEMOGLOBIN: 13.6 GM/DL (ref 12–16)
LYMPHOCYTES # BLD: 10.3 %
LYMPHOCYTES ABSOLUTE: 0.6 THOU/MM3 (ref 1–4.8)
MCH RBC QN AUTO: 31.1 PG (ref 27–31)
MCHC RBC AUTO-ENTMCNC: 34 GM/DL (ref 33–37)
MCV RBC AUTO: 91.5 FL (ref 81–99)
MONOCYTES # BLD: 7.5 %
MONOCYTES ABSOLUTE: 0.4 THOU/MM3 (ref 0.4–1.3)
NUCLEATED RED BLOOD CELLS: 0 /100 WBC
OSMOLALITY CALCULATION: 277.1 MOSMOL/KG (ref 275–300)
PDW BLD-RTO: 13.4 % (ref 11.5–14.5)
PLATELET # BLD: 208 THOU/MM3 (ref 130–400)
PMV BLD AUTO: 10.1 FL (ref 7.4–10.4)
POTASSIUM SERPL-SCNC: 4.4 MEQ/L (ref 3.5–5.2)
RBC # BLD: 4.38 MILL/MM3 (ref 4.2–5.4)
SEG NEUTROPHILS: 81 %
SEGMENTED NEUTROPHILS ABSOLUTE COUNT: 4.5 THOU/MM3 (ref 1.8–7.7)
SODIUM BLD-SCNC: 139 MEQ/L (ref 135–145)
WBC # BLD: 5.6 THOU/MM3 (ref 4.8–10.8)

## 2018-02-21 PROCEDURE — 96374 THER/PROPH/DIAG INJ IV PUSH: CPT

## 2018-02-21 PROCEDURE — 99284 EMERGENCY DEPT VISIT MOD MDM: CPT

## 2018-02-21 PROCEDURE — 6360000002 HC RX W HCPCS: Performed by: EMERGENCY MEDICINE

## 2018-02-21 PROCEDURE — 6370000000 HC RX 637 (ALT 250 FOR IP): Performed by: EMERGENCY MEDICINE

## 2018-02-21 PROCEDURE — 96361 HYDRATE IV INFUSION ADD-ON: CPT

## 2018-02-21 PROCEDURE — 93005 ELECTROCARDIOGRAM TRACING: CPT | Performed by: EMERGENCY MEDICINE

## 2018-02-21 PROCEDURE — 36415 COLL VENOUS BLD VENIPUNCTURE: CPT

## 2018-02-21 PROCEDURE — 85025 COMPLETE CBC W/AUTO DIFF WBC: CPT

## 2018-02-21 PROCEDURE — 2580000003 HC RX 258: Performed by: EMERGENCY MEDICINE

## 2018-02-21 PROCEDURE — 80048 BASIC METABOLIC PNL TOTAL CA: CPT

## 2018-02-21 PROCEDURE — 71045 X-RAY EXAM CHEST 1 VIEW: CPT

## 2018-02-21 RX ORDER — ACETAMINOPHEN 160 MG/5ML
650 SOLUTION ORAL ONCE
Status: COMPLETED | OUTPATIENT
Start: 2018-02-21 | End: 2018-02-21

## 2018-02-21 RX ORDER — ONDANSETRON 2 MG/ML
4 INJECTION INTRAMUSCULAR; INTRAVENOUS ONCE
Status: COMPLETED | OUTPATIENT
Start: 2018-02-21 | End: 2018-02-21

## 2018-02-21 RX ORDER — 0.9 % SODIUM CHLORIDE 0.9 %
500 INTRAVENOUS SOLUTION INTRAVENOUS ONCE
Status: COMPLETED | OUTPATIENT
Start: 2018-02-21 | End: 2018-02-21

## 2018-02-21 RX ORDER — ACETAMINOPHEN 325 MG/1
650 TABLET ORAL EVERY 4 HOURS PRN
Status: DISCONTINUED | OUTPATIENT
Start: 2018-02-21 | End: 2018-02-21

## 2018-02-21 RX ORDER — 0.9 % SODIUM CHLORIDE 0.9 %
1000 INTRAVENOUS SOLUTION INTRAVENOUS ONCE
Status: COMPLETED | OUTPATIENT
Start: 2018-02-21 | End: 2018-02-21

## 2018-02-21 RX ADMIN — SODIUM CHLORIDE 500 ML: 9 INJECTION, SOLUTION INTRAVENOUS at 12:43

## 2018-02-21 RX ADMIN — ACETAMINOPHEN 650 MG: 650 SOLUTION ORAL at 14:31

## 2018-02-21 RX ADMIN — ONDANSETRON 4 MG: 2 INJECTION INTRAMUSCULAR; INTRAVENOUS at 12:43

## 2018-02-21 RX ADMIN — SODIUM CHLORIDE 1000 ML: 9 INJECTION, SOLUTION INTRAVENOUS at 15:00

## 2018-02-21 ASSESSMENT — ENCOUNTER SYMPTOMS
VOMITING: 1
DIARRHEA: 0
SHORTNESS OF BREATH: 0
WHEEZING: 0
RHINORRHEA: 0
EYE PAIN: 0
EYE DISCHARGE: 0
BACK PAIN: 0
SORE THROAT: 0
ABDOMINAL PAIN: 0
COUGH: 0
NAUSEA: 1

## 2018-02-21 ASSESSMENT — PAIN SCALES - GENERAL
PAINLEVEL_OUTOF10: 6
PAINLEVEL_OUTOF10: 6

## 2018-02-21 NOTE — TELEPHONE ENCOUNTER
Patient called today c/o of fever of 101/102, extreme nausea, lightheadedness. Took nausea meds this morning, hasn't gotten much fluids in today, 40 oz. Yesterday. No vomittin yet, regular BMs - last one 2 days ago. Spoke with Nu Burden who is ordering IV fluids for today and tomorrow. Called to get this scheduled. Patient can go to [facility] this. Today can go now, tomorrow scheduleD for 12:00 NOON. Called patient back but got VM. Left message with above information as well as advised to push fluids as much as possible going forward. Also told to call back with any questions.

## 2018-02-21 NOTE — ED NOTES
Pt updated on plan of care and answered questions as able. IV fluids infusing. Pt  at home.      Altagracia Hull RN  02/21/18 [id number]

## 2018-02-21 NOTE — CARE COORDINATION
Brief ED Social Work Assessment  2/21/18, 2:04 PM    Patient stated that she has been okay getting around since her surgery. She stated that she is struggling to get all the fluids in that she needs. Patient denied any needs at this time.

## 2018-02-21 NOTE — ED TRIAGE NOTES
Pt presents to ER with generalized weakness that started this morning at 0700. Pt had gastric sleeve surgery by Dr Harleen More on February 12, pt states surgery went as planned with no complications.

## 2018-02-21 NOTE — ED PROVIDER NOTES
[facility]  eMERGENCY dEPARTMENT eNCOUnter          CHIEF COMPLAINT       Chief Complaint   Patient presents with    Fatigue       Nurses Notes reviewed and I agree except as noted in the HPI. HISTORY OF PRESENT ILLNESS    Marcia Brandt is a 25 y.o. female who presents to the Emergency Department for the evaluation of fatigue onset this morning. She reports associated lightheadedness, weakness, nausea, vomiting and fever as high as 102. She denies abdominal pain. Patient had a gastric sleeve surgery by Dr. Raji Jimenez on 02/12/18. She states the surgery went well with no complications. She states she is not allowed to eat food, just drink which she can't keep down. The HPI was provided by the patient. REVIEW OF SYSTEMS     Review of Systems   Constitutional: Positive for fatigue and fever (102). Negative for appetite change and chills. HENT: Negative for congestion, ear pain, rhinorrhea and sore throat. Eyes: Negative for pain, discharge and visual disturbance. Respiratory: Negative for cough, shortness of breath and wheezing. Cardiovascular: Negative for chest pain, palpitations and leg swelling. Gastrointestinal: Positive for nausea and vomiting. Negative for abdominal pain and diarrhea. Genitourinary: Negative for difficulty urinating, dysuria and vaginal discharge. Musculoskeletal: Negative for arthralgias, back pain, joint swelling and neck pain. Skin: Negative for pallor and rash. Neurological: Positive for weakness and light-headedness. Negative for dizziness, syncope and headaches. Hematological: Negative for adenopathy. Psychiatric/Behavioral: Negative for confusion, dysphoric mood and suicidal ideas. The patient is not nervous/anxious. PAST MEDICAL HISTORY    has a past medical history of Anxiety; Asthma; Chronic back pain; Depression; Diabetes mellitus ([address] GERD (gastroesophageal reflux disease); Headache; Hypertension;  Infertility, female; signed by Dr. Sánchez Decker on 2/21/2018 2:53 PM          LABS:   Labs Reviewed   CBC WITH AUTO DIFFERENTIAL - Abnormal; Notable for the following:        Result Value    MCH 31.1 (*)     Lymphocytes # 0.6 (*)     All other components within normal limits   BASIC METABOLIC PANEL - Abnormal; Notable for the following:     Glucose 115 (*)     All other components within normal limits   GLOMERULAR FILTRATION RATE, ESTIMATED - Abnormal; Notable for the following:     Est, Glom Filt Rate 84 (*)     All other components within normal limits   ANION GAP   OSMOLALITY       EMERGENCY DEPARTMENT COURSE:   Vitals:    Vitals:    02/21/18 1220   BP: (!) 152/99   Pulse: 110   Resp: 20   Temp: 99.5 °F (37.5 °C)   TempSrc: Oral   SpO2: 95%   Weight: (!) 322 lb (146.1 kg)   Height: 5' 9.5\" (1.765 m)       12:34 PM: The patient was seen and evaluated. MDM:  The patient was seen and evaluated within the ED today following fatigue. Within the department, I observed the patient's vital signs to be within acceptable range, with the exception of hypertension and tachycardia. On exam, I appreciated no acute findings. Radiological studies revealed no acute findings. Laboratory work was reassuring. Within the department, the patient was treated with IV bolus, Zofran and Tylenol. I observed the patient's condition to improve during the duration of their stay. I explained my proposed course of treatment to the patient, and they were amenable to my decision. They were discharged home, and will return to the ED if their symptoms become more severe in nature, or otherwise change. The patient is to follow up with her PCP and Dr. Rajesh Spencer. CRITICAL CARE:   None     CONSULTS:  1839 Discussed with Dr. Rajesh Spencer and he suggested IVF and to re-evaluate. 0965 Will discharge patient home and suggest follow up with PMD    PROCEDURES:  None     FINAL IMPRESSION      1.  Dehydration, mild          DISPOSITION/PLAN   Patient was discharged in

## 2018-02-21 NOTE — ED NOTES
Reassessment of the patients Fatigue   is unchanged, the patients pain reassessment is a 0/10, Side rails up times 2, call light in reach, will continue to monitor.        Marisol Cardoza RN  02/21/18 9259

## 2018-02-22 ENCOUNTER — HOSPITAL ENCOUNTER (OUTPATIENT)
Dept: GENERAL RADIOLOGY | Age: 28
Discharge: HOME | DRG: 711 | End: 2018-02-22
Payer: COMMERCIAL

## 2018-02-22 VITALS
DIASTOLIC BLOOD PRESSURE: 85 MMHG | HEART RATE: 84 BPM | RESPIRATION RATE: 18 BRPM | OXYGEN SATURATION: 96 % | TEMPERATURE: 97.6 F | SYSTOLIC BLOOD PRESSURE: 133 MMHG

## 2018-02-22 DIAGNOSIS — E86.0 DEHYDRATION: ICD-10-CM

## 2018-02-22 LAB
BLOOD CULTURE, ROUTINE: NORMAL
BLOOD CULTURE, ROUTINE: NORMAL

## 2018-02-22 PROCEDURE — 96360 HYDRATION IV INFUSION INIT: CPT

## 2018-02-22 PROCEDURE — 93010 ELECTROCARDIOGRAM REPORT: CPT | Performed by: INTERNAL MEDICINE

## 2018-02-22 PROCEDURE — 2580000003 HC RX 258: Performed by: PHYSICIAN ASSISTANT

## 2018-02-22 PROCEDURE — 96361 HYDRATE IV INFUSION ADD-ON: CPT

## 2018-02-22 RX ORDER — 0.9 % SODIUM CHLORIDE 0.9 %
2000 INTRAVENOUS SOLUTION INTRAVENOUS DAILY
Status: COMPLETED | OUTPATIENT
Start: 2018-02-22 | End: 2018-02-22

## 2018-02-22 RX ORDER — 0.9 % SODIUM CHLORIDE 0.9 %
2000 INTRAVENOUS SOLUTION INTRAVENOUS DAILY
Status: CANCELLED | OUTPATIENT
Start: 2018-02-22

## 2018-02-22 RX ADMIN — SODIUM CHLORIDE 1000 ML: 9 INJECTION, SOLUTION INTRAVENOUS at 12:10

## 2018-02-22 RX ADMIN — SODIUM CHLORIDE 1000 ML: 9 INJECTION, SOLUTION INTRAVENOUS at 13:15

## 2018-02-22 NOTE — PROGRESS NOTES
IV 0.9 NS infused and discontinued, total infused=2000 ml. bandaid to site, Pulse regular. Extremities warm. Respirations regular and quiet. Mucous membranes pink & moist. Alert and oriented times 3. No nausea or vomiting. Range of motion within patient's limits. Skin pink, warm and dry. Calm and cooperative.

## 2018-02-22 NOTE — TELEPHONE ENCOUNTER
Called patient this morning after seeing that she went to [place] [address] ED yesterday for treatment of dehydration. Patient stated she is feeling better today. I confirmed with her that she will be going to [facility] at noon today for another round of IV fluids. She stated she will go there at noon. Also stressed again the need for pushing her fluid intake.

## 2018-02-22 NOTE — PLAN OF CARE
Problem: Intellectual/Education/Knowledge Deficit  Intervention: Verbal/written education provided  Pt. Here for IV fluids, pt. States she had \"gastric sleeve\" done 2/12/2018 and not able to eat, has lost 30 #, pt. Went to ER yesterday and rec'd fluids. Pt. Declines any questions.

## 2018-02-23 ENCOUNTER — HOSPITAL ENCOUNTER (OUTPATIENT)
Age: 28
Discharge: HOME | DRG: 711 | End: 2018-02-23
Payer: COMMERCIAL

## 2018-02-23 ENCOUNTER — HOSPITAL ENCOUNTER (INPATIENT)
Age: 28
LOS: 11 days | Discharge: HOME | DRG: 711 | End: 2018-03-06
Attending: EMERGENCY MEDICINE | Admitting: SURGERY
Payer: COMMERCIAL

## 2018-02-23 ENCOUNTER — HOSPITAL ENCOUNTER (OUTPATIENT)
Dept: GENERAL RADIOLOGY | Age: 28
Discharge: HOME | DRG: 711 | End: 2018-02-23
Payer: COMMERCIAL

## 2018-02-23 ENCOUNTER — APPOINTMENT (OUTPATIENT)
Dept: GENERAL RADIOLOGY | Age: 28
DRG: 711 | End: 2018-02-23
Payer: COMMERCIAL

## 2018-02-23 ENCOUNTER — TELEPHONE (OUTPATIENT)
Dept: BARIATRICS/WEIGHT MGMT | Age: 28
End: 2018-02-23

## 2018-02-23 DIAGNOSIS — R50.9 FEVER, UNSPECIFIED FEVER CAUSE: ICD-10-CM

## 2018-02-23 DIAGNOSIS — R11.0 NAUSEA: ICD-10-CM

## 2018-02-23 DIAGNOSIS — E66.01 MORBID OBESITY WITH BMI OF 45.0-49.9, ADULT (HCC): ICD-10-CM

## 2018-02-23 DIAGNOSIS — T81.43XA POSTPROCEDURAL INTRAABDOMINAL ABSCESS: ICD-10-CM

## 2018-02-23 DIAGNOSIS — Z98.84 S/P LAPAROSCOPIC SLEEVE GASTRECTOMY: ICD-10-CM

## 2018-02-23 DIAGNOSIS — Z98.84 STATUS POST BARIATRIC SURGERY: ICD-10-CM

## 2018-02-23 DIAGNOSIS — N30.00 ACUTE CYSTITIS WITHOUT HEMATURIA: ICD-10-CM

## 2018-02-23 DIAGNOSIS — Z98.84 S/P LAPAROSCOPIC SLEEVE GASTRECTOMY: Primary | ICD-10-CM

## 2018-02-23 DIAGNOSIS — R50.9 FEBRILE ILLNESS, ACUTE: Primary | ICD-10-CM

## 2018-02-23 DIAGNOSIS — E11.8 TYPE 2 DIABETES MELLITUS WITH COMPLICATION, WITHOUT LONG-TERM CURRENT USE OF INSULIN (HCC): ICD-10-CM

## 2018-02-23 DIAGNOSIS — G89.18 ACUTE POSTOPERATIVE PAIN: ICD-10-CM

## 2018-02-23 LAB
ANION GAP SERPL CALCULATED.3IONS-SCNC: 16 MEQ/L (ref 8–16)
BACTERIA: ABNORMAL /HPF
BASOPHILS # BLD: 0.3 %
BASOPHILS ABSOLUTE: 0 THOU/MM3 (ref 0–0.1)
BILIRUBIN URINE: ABNORMAL
BLOOD, URINE: NEGATIVE
BUN BLDV-MCNC: 7 MG/DL (ref 7–22)
CALCIUM SERPL-MCNC: 9.7 MG/DL (ref 8.5–10.5)
CASTS 2: ABNORMAL /LPF
CASTS UA: ABNORMAL /LPF
CHARACTER, URINE: ABNORMAL
CHLORIDE BLD-SCNC: 100 MEQ/L (ref 98–111)
CO2: 24 MEQ/L (ref 23–33)
COLOR: ABNORMAL
CREAT SERPL-MCNC: 0.9 MG/DL (ref 0.4–1.2)
CRYSTALS, UA: ABNORMAL
EOSINOPHIL # BLD: 0.7 %
EOSINOPHILS ABSOLUTE: 0 THOU/MM3 (ref 0–0.4)
EPITHELIAL CELLS, UA: ABNORMAL /HPF
FLU A ANTIGEN: NEGATIVE
FLU B ANTIGEN: NEGATIVE
GFR SERPL CREATININE-BSD FRML MDRD: 73 ML/MIN/1.73M2
GLUCOSE BLD-MCNC: 109 MG/DL (ref 70–108)
GLUCOSE URINE: NEGATIVE MG/DL
GROUP A STREP CULTURE, REFLEX: NEGATIVE
HCT VFR BLD CALC: 41.8 % (ref 37–47)
HEMOGLOBIN: 13.9 GM/DL (ref 12–16)
ICTOTEST: NEGATIVE
KETONES, URINE: 40
LACTIC ACID: 1.2 MMOL/L (ref 0.5–2.2)
LEUKOCYTE ESTERASE, URINE: ABNORMAL
LYMPHOCYTES # BLD: 19.1 %
LYMPHOCYTES ABSOLUTE: 0.7 THOU/MM3 (ref 1–4.8)
MCH RBC QN AUTO: 30.2 PG (ref 27–31)
MCHC RBC AUTO-ENTMCNC: 33.3 GM/DL (ref 33–37)
MCV RBC AUTO: 90.8 FL (ref 81–99)
MISCELLANEOUS 2: ABNORMAL
MONOCYTES # BLD: 4.8 %
MONOCYTES ABSOLUTE: 0.2 THOU/MM3 (ref 0.4–1.3)
NITRITE, URINE: NEGATIVE
NUCLEATED RED BLOOD CELLS: 0 /100 WBC
PDW BLD-RTO: 14.1 % (ref 11.5–14.5)
PH UA: 5.5
PLATELET # BLD: 198 THOU/MM3 (ref 130–400)
PLATELET ESTIMATE: ADEQUATE
PMV BLD AUTO: 10.4 FL (ref 7.4–10.4)
POTASSIUM SERPL-SCNC: 4.3 MEQ/L (ref 3.5–5.2)
PROCALCITONIN: 0.43 NG/ML (ref 0.01–0.09)
PROTEIN UA: 100
RBC # BLD: 4.6 MILL/MM3 (ref 4.2–5.4)
RBC URINE: ABNORMAL /HPF
REFLEX THROAT C + S: NORMAL
RENAL EPITHELIAL, UA: ABNORMAL
SCAN OF BLOOD SMEAR: NORMAL
SEG NEUTROPHILS: 75.1 %
SEGMENTED NEUTROPHILS ABSOLUTE COUNT: 2.9 THOU/MM3 (ref 1.8–7.7)
SODIUM BLD-SCNC: 140 MEQ/L (ref 135–145)
SPECIFIC GRAVITY, URINE: 1.03 (ref 1–1.03)
UROBILINOGEN, URINE: 1 EU/DL
WBC # BLD: 3.8 THOU/MM3 (ref 4.8–10.8)
WBC UA: ABNORMAL /HPF
YEAST: ABNORMAL

## 2018-02-23 PROCEDURE — 87070 CULTURE OTHR SPECIMN AEROBIC: CPT

## 2018-02-23 PROCEDURE — 81001 URINALYSIS AUTO W/SCOPE: CPT

## 2018-02-23 PROCEDURE — 36415 COLL VENOUS BLD VENIPUNCTURE: CPT

## 2018-02-23 PROCEDURE — 2580000003 HC RX 258: Performed by: EMERGENCY MEDICINE

## 2018-02-23 PROCEDURE — 6360000002 HC RX W HCPCS: Performed by: EMERGENCY MEDICINE

## 2018-02-23 PROCEDURE — 1200000000 HC SEMI PRIVATE

## 2018-02-23 PROCEDURE — 87804 INFLUENZA ASSAY W/OPTIC: CPT

## 2018-02-23 PROCEDURE — G0378 HOSPITAL OBSERVATION PER HR: HCPCS

## 2018-02-23 PROCEDURE — 99284 EMERGENCY DEPT VISIT MOD MDM: CPT

## 2018-02-23 PROCEDURE — 6360000002 HC RX W HCPCS: Performed by: SURGERY

## 2018-02-23 PROCEDURE — 74240 X-RAY XM UPR GI TRC 1CNTRST: CPT

## 2018-02-23 PROCEDURE — 96374 THER/PROPH/DIAG INJ IV PUSH: CPT

## 2018-02-23 PROCEDURE — 84145 PROCALCITONIN (PCT): CPT

## 2018-02-23 PROCEDURE — 87040 BLOOD CULTURE FOR BACTERIA: CPT

## 2018-02-23 PROCEDURE — C9113 INJ PANTOPRAZOLE SODIUM, VIA: HCPCS | Performed by: SURGERY

## 2018-02-23 PROCEDURE — 87880 STREP A ASSAY W/OPTIC: CPT

## 2018-02-23 PROCEDURE — 83605 ASSAY OF LACTIC ACID: CPT

## 2018-02-23 PROCEDURE — 87086 URINE CULTURE/COLONY COUNT: CPT

## 2018-02-23 PROCEDURE — 6370000000 HC RX 637 (ALT 250 FOR IP): Performed by: SURGERY

## 2018-02-23 PROCEDURE — 6370000000 HC RX 637 (ALT 250 FOR IP): Performed by: EMERGENCY MEDICINE

## 2018-02-23 PROCEDURE — 71046 X-RAY EXAM CHEST 2 VIEWS: CPT

## 2018-02-23 PROCEDURE — 2580000003 HC RX 258: Performed by: SURGERY

## 2018-02-23 RX ORDER — SODIUM CHLORIDE 0.9 % (FLUSH) 0.9 %
10 SYRINGE (ML) INJECTION EVERY 12 HOURS SCHEDULED
Status: DISCONTINUED | OUTPATIENT
Start: 2018-02-23 | End: 2018-02-25 | Stop reason: SDUPTHER

## 2018-02-23 RX ORDER — ACETAMINOPHEN 650 MG/1
650 SUPPOSITORY RECTAL ONCE
Status: COMPLETED | OUTPATIENT
Start: 2018-02-23 | End: 2018-02-23

## 2018-02-23 RX ORDER — 0.9 % SODIUM CHLORIDE 0.9 %
10 VIAL (ML) INJECTION DAILY
Status: DISCONTINUED | OUTPATIENT
Start: 2018-02-23 | End: 2018-02-25

## 2018-02-23 RX ORDER — PANTOPRAZOLE SODIUM 40 MG/10ML
40 INJECTION, POWDER, LYOPHILIZED, FOR SOLUTION INTRAVENOUS DAILY
Status: DISCONTINUED | OUTPATIENT
Start: 2018-02-23 | End: 2018-02-25

## 2018-02-23 RX ORDER — KETOROLAC TROMETHAMINE 30 MG/ML
15 INJECTION, SOLUTION INTRAMUSCULAR; INTRAVENOUS EVERY 6 HOURS
Status: DISCONTINUED | OUTPATIENT
Start: 2018-02-23 | End: 2018-02-25

## 2018-02-23 RX ORDER — PROMETHAZINE HYDROCHLORIDE 25 MG/1
25 SUPPOSITORY RECTAL EVERY 6 HOURS PRN
Status: DISCONTINUED | OUTPATIENT
Start: 2018-02-23 | End: 2018-03-06 | Stop reason: HOSPADM

## 2018-02-23 RX ORDER — ACETAMINOPHEN 325 MG/1
650 TABLET ORAL EVERY 4 HOURS PRN
Status: DISCONTINUED | OUTPATIENT
Start: 2018-02-23 | End: 2018-03-06 | Stop reason: HOSPADM

## 2018-02-23 RX ORDER — MORPHINE SULFATE 2 MG/ML
2 INJECTION, SOLUTION INTRAMUSCULAR; INTRAVENOUS
Status: DISPENSED | OUTPATIENT
Start: 2018-02-23 | End: 2018-02-24

## 2018-02-23 RX ORDER — MORPHINE SULFATE 4 MG/ML
4 INJECTION, SOLUTION INTRAMUSCULAR; INTRAVENOUS
Status: ACTIVE | OUTPATIENT
Start: 2018-02-23 | End: 2018-02-24

## 2018-02-23 RX ORDER — ONDANSETRON 2 MG/ML
4 INJECTION INTRAMUSCULAR; INTRAVENOUS ONCE
Status: COMPLETED | OUTPATIENT
Start: 2018-02-23 | End: 2018-02-23

## 2018-02-23 RX ORDER — HYOSCYAMINE SULFATE 0.125 MG
125 TABLET,DISINTEGRATING ORAL EVERY 4 HOURS PRN
Status: DISCONTINUED | OUTPATIENT
Start: 2018-02-23 | End: 2018-03-03 | Stop reason: SDUPTHER

## 2018-02-23 RX ORDER — SODIUM CHLORIDE 9 MG/ML
INJECTION, SOLUTION INTRAVENOUS CONTINUOUS
Status: DISCONTINUED | OUTPATIENT
Start: 2018-02-23 | End: 2018-02-24

## 2018-02-23 RX ORDER — SODIUM CHLORIDE, SODIUM LACTATE, POTASSIUM CHLORIDE, CALCIUM CHLORIDE 600; 310; 30; 20 MG/100ML; MG/100ML; MG/100ML; MG/100ML
INJECTION, SOLUTION INTRAVENOUS CONTINUOUS
Status: DISCONTINUED | OUTPATIENT
Start: 2018-02-23 | End: 2018-03-06 | Stop reason: HOSPADM

## 2018-02-23 RX ORDER — DOCUSATE SODIUM 100 MG/1
100 CAPSULE, LIQUID FILLED ORAL 2 TIMES DAILY
Status: DISCONTINUED | OUTPATIENT
Start: 2018-02-23 | End: 2018-02-25

## 2018-02-23 RX ORDER — 0.9 % SODIUM CHLORIDE 0.9 %
500 INTRAVENOUS SOLUTION INTRAVENOUS ONCE
Status: COMPLETED | OUTPATIENT
Start: 2018-02-23 | End: 2018-02-23

## 2018-02-23 RX ORDER — ONDANSETRON 2 MG/ML
4 INJECTION INTRAMUSCULAR; INTRAVENOUS EVERY 6 HOURS PRN
Status: DISCONTINUED | OUTPATIENT
Start: 2018-02-23 | End: 2018-03-06 | Stop reason: HOSPADM

## 2018-02-23 RX ORDER — ALBUTEROL SULFATE 90 UG/1
2 AEROSOL, METERED RESPIRATORY (INHALATION) EVERY 6 HOURS PRN
Status: DISCONTINUED | OUTPATIENT
Start: 2018-02-23 | End: 2018-03-06 | Stop reason: HOSPADM

## 2018-02-23 RX ORDER — PROMETHAZINE HYDROCHLORIDE 25 MG/ML
25 INJECTION, SOLUTION INTRAMUSCULAR; INTRAVENOUS EVERY 6 HOURS PRN
Status: DISCONTINUED | OUTPATIENT
Start: 2018-02-23 | End: 2018-03-06 | Stop reason: HOSPADM

## 2018-02-23 RX ORDER — LEVOTHYROXINE SODIUM 0.12 MG/1
125 TABLET ORAL EVERY MORNING
Status: DISCONTINUED | OUTPATIENT
Start: 2018-02-24 | End: 2018-02-25

## 2018-02-23 RX ORDER — SODIUM CHLORIDE 0.9 % (FLUSH) 0.9 %
10 SYRINGE (ML) INJECTION PRN
Status: DISCONTINUED | OUTPATIENT
Start: 2018-02-23 | End: 2018-02-25 | Stop reason: SDUPTHER

## 2018-02-23 RX ORDER — METOCLOPRAMIDE HYDROCHLORIDE 5 MG/ML
10 INJECTION INTRAMUSCULAR; INTRAVENOUS EVERY 6 HOURS PRN
Status: DISCONTINUED | OUTPATIENT
Start: 2018-02-23 | End: 2018-03-06 | Stop reason: HOSPADM

## 2018-02-23 RX ADMIN — MORPHINE SULFATE 2 MG: 2 INJECTION, SOLUTION INTRAMUSCULAR; INTRAVENOUS at 23:57

## 2018-02-23 RX ADMIN — ACETAMINOPHEN 650 MG: 650 SUPPOSITORY RECTAL at 15:50

## 2018-02-23 RX ADMIN — MORPHINE SULFATE 2 MG: 2 INJECTION, SOLUTION INTRAMUSCULAR; INTRAVENOUS at 20:10

## 2018-02-23 RX ADMIN — PANTOPRAZOLE SODIUM 40 MG: 40 INJECTION, POWDER, FOR SOLUTION INTRAVENOUS at 20:03

## 2018-02-23 RX ADMIN — METOCLOPRAMIDE 10 MG: 5 INJECTION, SOLUTION INTRAMUSCULAR; INTRAVENOUS at 23:55

## 2018-02-23 RX ADMIN — WATER 1 G: 1 INJECTION INTRAMUSCULAR; INTRAVENOUS; SUBCUTANEOUS at 17:09

## 2018-02-23 RX ADMIN — Medication 10 ML: at 20:11

## 2018-02-23 RX ADMIN — ONDANSETRON 4 MG: 2 INJECTION INTRAMUSCULAR; INTRAVENOUS at 20:14

## 2018-02-23 RX ADMIN — SODIUM CHLORIDE, POTASSIUM CHLORIDE, SODIUM LACTATE AND CALCIUM CHLORIDE: 600; 310; 30; 20 INJECTION, SOLUTION INTRAVENOUS at 21:25

## 2018-02-23 RX ADMIN — ENOXAPARIN SODIUM 40 MG: 40 INJECTION SUBCUTANEOUS at 20:03

## 2018-02-23 RX ADMIN — SODIUM CHLORIDE: 9 INJECTION, SOLUTION INTRAVENOUS at 17:07

## 2018-02-23 RX ADMIN — SODIUM CHLORIDE 500 ML: 9 INJECTION, SOLUTION INTRAVENOUS at 15:22

## 2018-02-23 RX ADMIN — ONDANSETRON 4 MG: 2 INJECTION INTRAMUSCULAR; INTRAVENOUS at 15:22

## 2018-02-23 ASSESSMENT — PAIN SCALES - GENERAL
PAINLEVEL_OUTOF10: 6
PAINLEVEL_OUTOF10: 0
PAINLEVEL_OUTOF10: 4
PAINLEVEL_OUTOF10: 6
PAINLEVEL_OUTOF10: 4
PAINLEVEL_OUTOF10: 4

## 2018-02-23 ASSESSMENT — ENCOUNTER SYMPTOMS
COUGH: 1
NAUSEA: 1
WHEEZING: 0
DIARRHEA: 0
EYE DISCHARGE: 0
SHORTNESS OF BREATH: 0
EYE PAIN: 0
RHINORRHEA: 0
BACK PAIN: 0
VOMITING: 1
SORE THROAT: 0
ABDOMINAL PAIN: 0

## 2018-02-23 ASSESSMENT — PAIN DESCRIPTION - PAIN TYPE: TYPE: ACUTE PAIN

## 2018-02-23 ASSESSMENT — PAIN DESCRIPTION - LOCATION: LOCATION: ABDOMEN;HEAD;FLANK

## 2018-02-23 NOTE — ED NOTES
Patient transported to [facility] via cart in stable condition.      Contact made with  prior to transport        Ann Box, EMT-P  02/23/18 5728

## 2018-02-23 NOTE — TELEPHONE ENCOUNTER
LAURENM. Dr. Jarvis Dockery has ordered labs and an Upper GI-to be done today. Please call me when you get this message [phone] not eat or drink this morning as you will need to be NPO for this [address].

## 2018-02-23 NOTE — ED NOTES
Pt updated on POC. Pt now dry heaving. Pt assumes from Zofran as she hasn't taken any PO.        Karolina Tapia RN  02/23/18 6923

## 2018-02-23 NOTE — PROGRESS NOTES
Patient arrived to 7K26 from ED by cart. Patient alert and oriented, respirations easy and unlabored. No signs of distress noted at this time. 0.9% infusing by gravity to left arm, patent. Refer to flowsheets for vital signs. Patient denies pain at this time. X 6 surgical stab sites to abdomen, steri strips in place. No redness or drainage noted. Patient re-oriented to room and call light. Parents at bedside.

## 2018-02-24 ENCOUNTER — APPOINTMENT (OUTPATIENT)
Dept: CT IMAGING | Age: 28
DRG: 711 | End: 2018-02-24
Payer: COMMERCIAL

## 2018-02-24 LAB
ANION GAP SERPL CALCULATED.3IONS-SCNC: 13 MEQ/L (ref 8–16)
BUN BLDV-MCNC: 5 MG/DL (ref 7–22)
C-REACTIVE PROTEIN: 9.33 MG/DL (ref 0–1)
CALCIUM SERPL-MCNC: 8.7 MG/DL (ref 8.5–10.5)
CHLORIDE BLD-SCNC: 100 MEQ/L (ref 98–111)
CO2: 23 MEQ/L (ref 23–33)
CREAT SERPL-MCNC: 0.8 MG/DL (ref 0.4–1.2)
GFR SERPL CREATININE-BSD FRML MDRD: 84 ML/MIN/1.73M2
GLUCOSE BLD-MCNC: 78 MG/DL (ref 70–108)
GLUCOSE BLD-MCNC: 90 MG/DL (ref 70–108)
GLUCOSE BLD-MCNC: 92 MG/DL (ref 70–108)
GLUCOSE BLD-MCNC: 94 MG/DL (ref 70–108)
GLUCOSE BLD-MCNC: 99 MG/DL (ref 70–108)
HCT VFR BLD CALC: 36 % (ref 37–47)
HEMOGLOBIN: 12.2 GM/DL (ref 12–16)
MCH RBC QN AUTO: 31.1 PG (ref 27–31)
MCHC RBC AUTO-ENTMCNC: 34 GM/DL (ref 33–37)
MCV RBC AUTO: 91.5 FL (ref 81–99)
ORGANISM: ABNORMAL
ORGANISM: ABNORMAL
OSMOLALITY CALCULATION: 269.2 MOSMOL/KG (ref 275–300)
PDW BLD-RTO: 13.5 % (ref 11.5–14.5)
PLATELET # BLD: 139 THOU/MM3 (ref 130–400)
PMV BLD AUTO: 11.1 FL (ref 7.4–10.4)
POTASSIUM REFLEX MAGNESIUM: 3.7 MEQ/L (ref 3.5–5.2)
RBC # BLD: 3.94 MILL/MM3 (ref 4.2–5.4)
SODIUM BLD-SCNC: 136 MEQ/L (ref 135–145)
URINE CULTURE REFLEX: ABNORMAL
URINE CULTURE, ROUTINE: ABNORMAL
WBC # BLD: 3.7 THOU/MM3 (ref 4.8–10.8)

## 2018-02-24 PROCEDURE — 2580000003 HC RX 258: Performed by: SURGERY

## 2018-02-24 PROCEDURE — 2500000003 HC RX 250 WO HCPCS: Performed by: INTERNAL MEDICINE

## 2018-02-24 PROCEDURE — 1200000000 HC SEMI PRIVATE

## 2018-02-24 PROCEDURE — 80048 BASIC METABOLIC PNL TOTAL CA: CPT

## 2018-02-24 PROCEDURE — 74177 CT ABD & PELVIS W/CONTRAST: CPT

## 2018-02-24 PROCEDURE — 82948 REAGENT STRIP/BLOOD GLUCOSE: CPT

## 2018-02-24 PROCEDURE — 85027 COMPLETE CBC AUTOMATED: CPT

## 2018-02-24 PROCEDURE — G0378 HOSPITAL OBSERVATION PER HR: HCPCS

## 2018-02-24 PROCEDURE — 36415 COLL VENOUS BLD VENIPUNCTURE: CPT

## 2018-02-24 PROCEDURE — 87040 BLOOD CULTURE FOR BACTERIA: CPT

## 2018-02-24 PROCEDURE — 6360000002 HC RX W HCPCS: Performed by: SURGERY

## 2018-02-24 PROCEDURE — 99024 POSTOP FOLLOW-UP VISIT: CPT | Performed by: SURGERY

## 2018-02-24 PROCEDURE — 6360000004 HC RX CONTRAST MEDICATION: Performed by: INTERNAL MEDICINE

## 2018-02-24 PROCEDURE — 6370000000 HC RX 637 (ALT 250 FOR IP): Performed by: SURGERY

## 2018-02-24 PROCEDURE — 86140 C-REACTIVE PROTEIN: CPT

## 2018-02-24 PROCEDURE — C9113 INJ PANTOPRAZOLE SODIUM, VIA: HCPCS | Performed by: SURGERY

## 2018-02-24 RX ADMIN — KETOROLAC TROMETHAMINE 15 MG: 30 INJECTION, SOLUTION INTRAMUSCULAR at 06:34

## 2018-02-24 RX ADMIN — KETOROLAC TROMETHAMINE 15 MG: 30 INJECTION, SOLUTION INTRAMUSCULAR at 14:53

## 2018-02-24 RX ADMIN — ENOXAPARIN SODIUM 40 MG: 40 INJECTION SUBCUTANEOUS at 20:04

## 2018-02-24 RX ADMIN — SODIUM CHLORIDE, POTASSIUM CHLORIDE, SODIUM LACTATE AND CALCIUM CHLORIDE: 600; 310; 30; 20 INJECTION, SOLUTION INTRAVENOUS at 03:50

## 2018-02-24 RX ADMIN — KETOROLAC TROMETHAMINE 15 MG: 30 INJECTION, SOLUTION INTRAMUSCULAR at 23:56

## 2018-02-24 RX ADMIN — PANTOPRAZOLE SODIUM 40 MG: 40 INJECTION, POWDER, FOR SOLUTION INTRAVENOUS at 10:42

## 2018-02-24 RX ADMIN — PROMETHAZINE HYDROCHLORIDE 25 MG: 25 SUPPOSITORY RECTAL at 15:07

## 2018-02-24 RX ADMIN — ONDANSETRON 4 MG: 2 INJECTION INTRAMUSCULAR; INTRAVENOUS at 14:16

## 2018-02-24 RX ADMIN — ONDANSETRON 4 MG: 2 INJECTION INTRAMUSCULAR; INTRAVENOUS at 03:41

## 2018-02-24 RX ADMIN — IOPAMIDOL 80 ML: 755 INJECTION, SOLUTION INTRAVENOUS at 16:08

## 2018-02-24 RX ADMIN — ACETAMINOPHEN 650 MG: 325 TABLET ORAL at 05:20

## 2018-02-24 RX ADMIN — LEVOTHYROXINE SODIUM 125 MCG: 125 TABLET ORAL at 06:34

## 2018-02-24 RX ADMIN — ACETAMINOPHEN 650 MG: 325 TABLET ORAL at 15:04

## 2018-02-24 RX ADMIN — Medication 10 ML: at 20:04

## 2018-02-24 RX ADMIN — MORPHINE SULFATE 2 MG: 2 INJECTION, SOLUTION INTRAMUSCULAR; INTRAVENOUS at 03:47

## 2018-02-24 RX ADMIN — SODIUM CHLORIDE, POTASSIUM CHLORIDE, SODIUM LACTATE AND CALCIUM CHLORIDE: 600; 310; 30; 20 INJECTION, SOLUTION INTRAVENOUS at 14:51

## 2018-02-24 RX ADMIN — TAZOBACTAM SODIUM AND PIPERACILLIN SODIUM 3.38 G: 375; 3 INJECTION, SOLUTION INTRAVENOUS at 14:52

## 2018-02-24 RX ADMIN — TAZOBACTAM SODIUM AND PIPERACILLIN SODIUM 3.38 G: 375; 3 INJECTION, SOLUTION INTRAVENOUS at 23:43

## 2018-02-24 ASSESSMENT — PAIN SCALES - GENERAL
PAINLEVEL_OUTOF10: 6
PAINLEVEL_OUTOF10: 5
PAINLEVEL_OUTOF10: 5
PAINLEVEL_OUTOF10: 0
PAINLEVEL_OUTOF10: 4
PAINLEVEL_OUTOF10: 3

## 2018-02-24 ASSESSMENT — PAIN DESCRIPTION - PAIN TYPE
TYPE: SURGICAL PAIN
TYPE: SURGICAL PAIN;ACUTE PAIN

## 2018-02-24 ASSESSMENT — PAIN DESCRIPTION - LOCATION
LOCATION: ABDOMEN;HEAD
LOCATION: ABDOMEN;HEAD

## 2018-02-24 NOTE — CONSULTS
[address] [place] [address]                                   CONSULTATION    PATIENT NAME: Zuleyka Naqvi                  :        1990  MED REC NO:   [id number]                           ROOM:       0050  ACCOUNT NO:   [de-identified]                           ADMIT DATE: 2018  PROVIDER:     Maximo Ley M.D.    Zaki Liner:  2018    HISTORY OF PRESENT ILLNESS:  She is a 78-year-old female patient admitted  due to fever. She is a 78-year-old female patient whose past medical  history is significant for morbid obesity. The patient had a recent  gastric sleeve procedure and was discharged on . The patient  developed fever the next day [facility]  had a CAT scan of the abdomen. It shows left lower lung atelectasis,  questionable pneumonia. There was suspected hematoma on the spleen. The  patient was given Levaquin for over a week and again continues to have  fever for which reason she came to the hospital.  When I saw her, she was  having fever and chills. She has no dysuria, frequency, or urgency. She  does not have any chest pain or cough. She had no drainage from her  surgical site. She has already lost weight since she had the surgery. PAST MEDICAL HISTORY:  Significant for anxiety, asthma, chronic back pain,  depression, diabetes, gastroesophageal reflux disease, hypertension, and  previous history of UTI. PAST SURGICAL HISTORY:  Breast surgery, colonoscopy. She had gastric  sleeve procedure, upper endoscopy. CURRENT MEDICATIONS:  Tylenol, albuterol, Colace, Lovenox, hyoscyamine,  ketorolac, levothyroxine, metoclopramide, morphine, Protonix, and  promethazine. ALLERGIES:  She has allergies to BEE VENOM, BACTRIM, MIRAPEX, OXYCODONE,  and REQUIP. REVIEW OF SYSTEMS:  She has no headache. No sore throat, joint pain, or  runny nose.   She has no chest pain. No abdominal pain. No calf or leg  pain. The rest were noncontributory. PHYSICAL EXAMINATION:  VITAL SIGNS:  Temperature was 97.8, respirations 18, pulse 79, blood  pressure 136/79. HEENT:  She has pink conjunctivae, anicteric sclerae. CHEST:  Bilateral air entry. Diminished in both lower lung fields. CARDIOVASCULAR SYSTEM: Regular. ABDOMEN:  She has Steri-Strips on her surgical site, nontender. No  guarding or rebound tenderness. CNS:  She is conscious, oriented to person, place, and time. DIAGNOSTICS:  WBC of 3.7, hemoglobin 12.2, hematocrit 36, and platelets of  822. Sodium of 136, potassium 3.7, chloride 100, bicarb 23, BUN 5, and  creatinine 0.8. Osmolality of 269. Lactic acid was 1.2. IMPRESSION:  She is a 41-year-old female patient who had a recent gastric  sleeve procedure. She is having intermittent fevers with sweats and  chills. Postoperatively, the patient had questionable hematoma on the  spleen and atelectasis. I am not sure whether she has any collection or  any possible splenic abscess or infection. Recommendation is to put her empirically on antibiotics and we will do CAT  scan of the abdomen and pelvis to evaluate for any collection. We will  continue to follow the patient. Thank you for the consultation.         Reggie Herman M.D.    D: 02/24/2018 12:26:52       T: 02/24/2018 13:57:37     [email or web address]  Job#: [id number]     Doc#: [id number]    CC:

## 2018-02-24 NOTE — H&P
lactated ringers 125 mL/hr at 18 0350     PRN Meds:.albuterol sulfate HFA, sodium chloride flush, acetaminophen, morphine **OR** morphine, ondansetron, metoclopramide, hyoscyamine, promethazine, promethazine  Allergies  is allergic to bee venom; bactrim [sulfamethoxazole-trimethoprim]; mirapex [pramipexole]; oxycodone; and requip [ropinirole]. Family History  family history includes Birth Defects in her mother; Diabetes in her father and maternal grandmother; Heart Disease in her maternal grandfather; High Blood Pressure in her brother and father; Obesity in her brother, father, and mother. Social History   reports that she quit smoking about 3 years ago. Her smoking use included Cigarettes. She smoked 0.50 packs per day. She has never used smokeless tobacco. She reports that she does not drink alcohol or use drugs. Review of Systems:  General  Fever chills and some sweats since OR. No significant unexpected weight change. HEENT Denies any diplopia, tinnitus or vertigo. No chronic headaches. Resp Denies any shortness of breath, cough or wheezing  Cardiac Denies any chest pain, palpitations, claudication or edema  GI Denies any melena, hematochezia, hematemesis or pyrosis. Nausea and abdominal pain as mentioned above   Denies any frequency, urgency, hesitancy or incontinence  Heme Denies bruising or bleeding easily  Endocrine Denies any history of diabetes or thyroid disease  Neuro Denies any focal motor or sensory deficits  Musculoskeletal  Denies osteoarthritis. No gout. No weakness. Psychiatric  Denies any severe depression or agitation. No panic attacks. No suicidal ideation. OBJECTIVE:   CURRENT VITALS:  height is 5' 9\" (1.753 m) and weight is 320 lb (145.2 kg) (abnormal). Her temperature is 99.8 °F (37.7 °C). Her blood pressure is 132/61 and her pulse is 96. Her respiration is 18 and oxygen saturation is 92%.    Temperature Range (24h):Temp: 99.8 °F (37.7 °C) Temp  Av.8 °F (38.2 °C) fluid. No abscess.  No free air.           Electronically signed by Staci Wallace MD on 2/24/2018 at 8:16 AM

## 2018-02-24 NOTE — PLAN OF CARE
Problem: Pain:  Goal: Pain level will decrease  Pain level will decrease   Outcome: Ongoing  Pt taking pain medication on MAR to control pain. Pain goal is a 3     Problem: Safety:  Goal: Free from accidental physical injury  Free from accidental physical injury  Outcome: Ongoing  Pt able to use call light when needing assistance    Problem: Daily Care:  Goal: Daily care needs are met  Daily care needs are met  Outcome: Ongoing  Pt care needs are met as they arise     Problem: Skin Integrity:  Goal: Skin integrity will stabilize  Skin integrity will stabilize  Outcome: Ongoing  Pt has 6 surgical sites on abdomen with steri strips covering them    Problem: Discharge Planning:  Goal: Patients continuum of care needs are met  Patients continuum of care needs are met  Outcome: Ongoing  Pt discharge planning is in progress    Care plan reviewed with patient. Patient verbalizes understanding of the plan of care and contribute to goal setting.

## 2018-02-25 LAB
ANION GAP SERPL CALCULATED.3IONS-SCNC: 15 MEQ/L (ref 8–16)
BUN BLDV-MCNC: 6 MG/DL (ref 7–22)
CALCIUM SERPL-MCNC: 8.6 MG/DL (ref 8.5–10.5)
CHLORIDE BLD-SCNC: 102 MEQ/L (ref 98–111)
CO2: 23 MEQ/L (ref 23–33)
CREAT SERPL-MCNC: 0.8 MG/DL (ref 0.4–1.2)
GFR SERPL CREATININE-BSD FRML MDRD: 84 ML/MIN/1.73M2
GLUCOSE BLD-MCNC: 75 MG/DL (ref 70–108)
GLUCOSE BLD-MCNC: 86 MG/DL (ref 70–108)
HCT VFR BLD CALC: 35.5 % (ref 37–47)
HEMOGLOBIN: 11.7 GM/DL (ref 12–16)
MCH RBC QN AUTO: 29.5 PG (ref 27–31)
MCHC RBC AUTO-ENTMCNC: 32.8 GM/DL (ref 33–37)
MCV RBC AUTO: 90 FL (ref 81–99)
PDW BLD-RTO: 14 % (ref 11.5–14.5)
PLATELET # BLD: 124 THOU/MM3 (ref 130–400)
PMV BLD AUTO: 11.1 FL (ref 7.4–10.4)
POTASSIUM SERPL-SCNC: 3.5 MEQ/L (ref 3.5–5.2)
RBC # BLD: 3.95 MILL/MM3 (ref 4.2–5.4)
SODIUM BLD-SCNC: 140 MEQ/L (ref 135–145)
THROAT/NOSE CULTURE: NORMAL
WBC # BLD: 3.2 THOU/MM3 (ref 4.8–10.8)

## 2018-02-25 PROCEDURE — 6360000002 HC RX W HCPCS: Performed by: SURGERY

## 2018-02-25 PROCEDURE — 36415 COLL VENOUS BLD VENIPUNCTURE: CPT

## 2018-02-25 PROCEDURE — 99024 POSTOP FOLLOW-UP VISIT: CPT | Performed by: SURGERY

## 2018-02-25 PROCEDURE — 1200000000 HC SEMI PRIVATE

## 2018-02-25 PROCEDURE — 80048 BASIC METABOLIC PNL TOTAL CA: CPT

## 2018-02-25 PROCEDURE — 76937 US GUIDE VASCULAR ACCESS: CPT

## 2018-02-25 PROCEDURE — 85027 COMPLETE CBC AUTOMATED: CPT

## 2018-02-25 PROCEDURE — 6360000002 HC RX W HCPCS

## 2018-02-25 PROCEDURE — C9113 INJ PANTOPRAZOLE SODIUM, VIA: HCPCS | Performed by: SURGERY

## 2018-02-25 PROCEDURE — C1751 CATH, INF, PER/CENT/MIDLINE: HCPCS

## 2018-02-25 PROCEDURE — 2500000003 HC RX 250 WO HCPCS: Performed by: INTERNAL MEDICINE

## 2018-02-25 PROCEDURE — A4212 NON CORING NEEDLE OR STYLET: HCPCS

## 2018-02-25 PROCEDURE — 2580000003 HC RX 258: Performed by: SURGERY

## 2018-02-25 PROCEDURE — 82948 REAGENT STRIP/BLOOD GLUCOSE: CPT

## 2018-02-25 PROCEDURE — 36568 INSJ PICC <5 YR W/O IMAGING: CPT

## 2018-02-25 RX ORDER — ACETAMINOPHEN 650 MG/1
650 SUPPOSITORY RECTAL EVERY 6 HOURS PRN
Status: DISCONTINUED | OUTPATIENT
Start: 2018-02-25 | End: 2018-03-06 | Stop reason: HOSPADM

## 2018-02-25 RX ORDER — PANTOPRAZOLE SODIUM 40 MG/10ML
40 INJECTION, POWDER, LYOPHILIZED, FOR SOLUTION INTRAVENOUS 2 TIMES DAILY
Status: DISCONTINUED | OUTPATIENT
Start: 2018-02-25 | End: 2018-03-06 | Stop reason: HOSPADM

## 2018-02-25 RX ORDER — SODIUM CHLORIDE 0.9 % (FLUSH) 0.9 %
10 SYRINGE (ML) INJECTION PRN
Status: DISCONTINUED | OUTPATIENT
Start: 2018-02-25 | End: 2018-03-02 | Stop reason: SDUPTHER

## 2018-02-25 RX ORDER — SODIUM CHLORIDE 0.9 % (FLUSH) 0.9 %
10 SYRINGE (ML) INJECTION EVERY 12 HOURS SCHEDULED
Status: DISCONTINUED | OUTPATIENT
Start: 2018-02-25 | End: 2018-03-02 | Stop reason: SDUPTHER

## 2018-02-25 RX ORDER — LEVOTHYROXINE SODIUM ANHYDROUS 100 UG/5ML
50 INJECTION, POWDER, LYOPHILIZED, FOR SOLUTION INTRAVENOUS DAILY
Status: DISCONTINUED | OUTPATIENT
Start: 2018-02-25 | End: 2018-02-25

## 2018-02-25 RX ORDER — 0.9 % SODIUM CHLORIDE 0.9 %
10 VIAL (ML) INJECTION 2 TIMES DAILY
Status: DISCONTINUED | OUTPATIENT
Start: 2018-02-25 | End: 2018-03-06 | Stop reason: HOSPADM

## 2018-02-25 RX ORDER — DIPHENHYDRAMINE HYDROCHLORIDE 50 MG/ML
25 INJECTION INTRAMUSCULAR; INTRAVENOUS EVERY 8 HOURS PRN
Status: DISCONTINUED | OUTPATIENT
Start: 2018-02-25 | End: 2018-03-06 | Stop reason: HOSPADM

## 2018-02-25 RX ORDER — DIPHENHYDRAMINE HYDROCHLORIDE 50 MG/ML
50 INJECTION INTRAMUSCULAR; INTRAVENOUS EVERY 8 HOURS PRN
Status: DISCONTINUED | OUTPATIENT
Start: 2018-02-25 | End: 2018-03-06 | Stop reason: HOSPADM

## 2018-02-25 RX ORDER — LORAZEPAM 2 MG/ML
0.25 INJECTION INTRAMUSCULAR EVERY 6 HOURS PRN
Status: DISCONTINUED | OUTPATIENT
Start: 2018-02-25 | End: 2018-03-01

## 2018-02-25 RX ORDER — 0.9 % SODIUM CHLORIDE 0.9 %
5 VIAL (ML) INJECTION DAILY
Status: DISCONTINUED | OUTPATIENT
Start: 2018-02-25 | End: 2018-02-25

## 2018-02-25 RX ORDER — MORPHINE SULFATE 2 MG/ML
2 INJECTION, SOLUTION INTRAMUSCULAR; INTRAVENOUS EVERY 4 HOURS PRN
Status: DISCONTINUED | OUTPATIENT
Start: 2018-02-25 | End: 2018-03-06 | Stop reason: HOSPADM

## 2018-02-25 RX ORDER — DIPHENHYDRAMINE HYDROCHLORIDE 50 MG/ML
INJECTION INTRAMUSCULAR; INTRAVENOUS
Status: COMPLETED
Start: 2018-02-25 | End: 2018-02-25

## 2018-02-25 RX ORDER — MORPHINE SULFATE 4 MG/ML
4 INJECTION, SOLUTION INTRAMUSCULAR; INTRAVENOUS EVERY 4 HOURS PRN
Status: DISCONTINUED | OUTPATIENT
Start: 2018-02-25 | End: 2018-03-06 | Stop reason: HOSPADM

## 2018-02-25 RX ORDER — LIDOCAINE HYDROCHLORIDE 10 MG/ML
5 INJECTION, SOLUTION EPIDURAL; INFILTRATION; INTRACAUDAL; PERINEURAL ONCE
Status: DISCONTINUED | OUTPATIENT
Start: 2018-02-25 | End: 2018-03-06 | Stop reason: HOSPADM

## 2018-02-25 RX ADMIN — SODIUM CHLORIDE, POTASSIUM CHLORIDE, SODIUM LACTATE AND CALCIUM CHLORIDE: 600; 310; 30; 20 INJECTION, SOLUTION INTRAVENOUS at 19:47

## 2018-02-25 RX ADMIN — DIPHENHYDRAMINE HYDROCHLORIDE 25 MG: 50 INJECTION, SOLUTION INTRAMUSCULAR; INTRAVENOUS at 12:13

## 2018-02-25 RX ADMIN — PANTOPRAZOLE SODIUM 40 MG: 40 INJECTION, POWDER, FOR SOLUTION INTRAVENOUS at 06:54

## 2018-02-25 RX ADMIN — SODIUM CHLORIDE, POTASSIUM CHLORIDE, SODIUM LACTATE AND CALCIUM CHLORIDE: 600; 310; 30; 20 INJECTION, SOLUTION INTRAVENOUS at 06:54

## 2018-02-25 RX ADMIN — LORAZEPAM 0.25 MG: 2 INJECTION INTRAMUSCULAR; INTRAVENOUS at 09:02

## 2018-02-25 RX ADMIN — DIPHENHYDRAMINE HYDROCHLORIDE 25 MG: 50 INJECTION, SOLUTION INTRAMUSCULAR; INTRAVENOUS at 03:17

## 2018-02-25 RX ADMIN — PANTOPRAZOLE SODIUM 40 MG: 40 INJECTION, POWDER, FOR SOLUTION INTRAVENOUS at 16:30

## 2018-02-25 RX ADMIN — ENOXAPARIN SODIUM 40 MG: 40 INJECTION SUBCUTANEOUS at 19:42

## 2018-02-25 RX ADMIN — Medication 10 ML: at 16:31

## 2018-02-25 RX ADMIN — TAZOBACTAM SODIUM AND PIPERACILLIN SODIUM 3.38 G: 375; 3 INJECTION, SOLUTION INTRAVENOUS at 06:54

## 2018-02-25 RX ADMIN — Medication 10 ML: at 06:55

## 2018-02-25 RX ADMIN — TAZOBACTAM SODIUM AND PIPERACILLIN SODIUM 3.38 G: 375; 3 INJECTION, SOLUTION INTRAVENOUS at 23:50

## 2018-02-25 RX ADMIN — MORPHINE SULFATE 2 MG: 2 INJECTION, SOLUTION INTRAMUSCULAR; INTRAVENOUS at 23:56

## 2018-02-25 RX ADMIN — Medication 10 ML: at 23:57

## 2018-02-25 RX ADMIN — MORPHINE SULFATE 2 MG: 2 INJECTION, SOLUTION INTRAMUSCULAR; INTRAVENOUS at 19:50

## 2018-02-25 RX ADMIN — TAZOBACTAM SODIUM AND PIPERACILLIN SODIUM 3.38 G: 375; 3 INJECTION, SOLUTION INTRAVENOUS at 16:24

## 2018-02-25 RX ADMIN — Medication 10 ML: at 19:43

## 2018-02-25 ASSESSMENT — PAIN SCALES - GENERAL
PAINLEVEL_OUTOF10: 5
PAINLEVEL_OUTOF10: 0
PAINLEVEL_OUTOF10: 6
PAINLEVEL_OUTOF10: 0
PAINLEVEL_OUTOF10: 7
PAINLEVEL_OUTOF10: 5
PAINLEVEL_OUTOF10: 5

## 2018-02-25 ASSESSMENT — PAIN DESCRIPTION - PAIN TYPE: TYPE: ACUTE PAIN

## 2018-02-25 ASSESSMENT — PAIN DESCRIPTION - ORIENTATION: ORIENTATION: RIGHT

## 2018-02-25 ASSESSMENT — PAIN DESCRIPTION - LOCATION: LOCATION: ARM

## 2018-02-25 NOTE — PROGRESS NOTES
Diabetes mellitus ([facility] [address] E11.9    Hypertension I10    GERD (gastroesophageal reflux disease) K21.9    Obesity E66.9    S/P laparoscopic sleeve gastrectomy Z98.84    Dehydration E86.0    Nausea R11.0    Fever R50.9         ASSESSMENT/PLAN   Post Robotic assisted sleeve gastrectomy  Post operative fever felt to be due to abscess intraabdominal. Plan for possible aspiration   picc line to be placed, on NPO.   Discussed with Dr Harleen More yesterday        Tan Moreno MD, [address] 2/25/2018 12:58 PM

## 2018-02-25 NOTE — PROGRESS NOTES
Oral QAM    sodium chloride flush  10 mL Intravenous 2 times per day    enoxaparin  40 mg Subcutaneous Daily    ketorolac  15 mg Intravenous Q6H    pantoprazole  40 mg Intravenous Daily    And    sodium chloride (PF)  10 mL Intravenous Daily     Continuous Infusions:   lactated ringers 125 mL/hr at 18 1451     PRN Meds:.diphenhydrAMINE **OR** diphenhydrAMINE, albuterol sulfate HFA, sodium chloride flush, acetaminophen, ondansetron, metoclopramide, hyoscyamine, promethazine, promethazine  OBJECTIVE   CURRENT VITALS:  height is 5' 9\" (1.753 m) and weight is 320 lb (145.2 kg) (abnormal). Her oral temperature is 98.1 °F (36.7 °C). Her blood pressure is 124/73 and her pulse is 67. Her respiration is 16 and oxygen saturation is 96%. Temperature Range (24h):Temp: 98.1 °F (36.7 °C) Temp  Av.2 °F (37.3 °C)  Min: 97.8 °F (36.6 °C)  Max: 102.8 °F (39.3 °C)  BP Range (64K): Systolic (73ONA), CQW:353 , Min:123 , FMJ:649     Diastolic (11OLO), GPT:20, Min:69, Max:82    Pulse Range (24h): Pulse  Av.2  Min: 67  Max: 90  Respiration Range (24h): Resp  Av.8  Min: 16  Max: 18  Current Pulse Ox (24h):  SpO2: 96 %  Pulse Ox Range (24h):  SpO2  Av.4 %  Min: 94 %  Max: 96 %  Oxygen Amount and Delivery:    Incentive Spirometry Tx:            GENERAL: alert, no distress  LUNGS: clear to ausculation, without wheezes, rales or rhonci  HEART: normal rate and regular rhythm  ABDOMEN: non-distended, soft, incisional tenderness, bowel sounds present. No guarding or peritoneal signs  INCISION: healing well, no significant drainage, no significant erythema  EXTERMITY: no cyanosis, clubbing or edema    In: 2125 [P.O.:1140;  I.V.:985]  Out: -      LABS     Recent Labs      18   1325  18   0618   WBC  3.8*  3.7*   HGB  13.9  12.2   HCT  41.8  36.0*   PLT  198  139   NA  140  136   K  4.3  3.7   CL  100  100   CO2  24  23   BUN  7  5*   CREATININE  0.9  0.8   CALCIUM  9.7  8.7      No results for input(s): adenopathy.       No suspicious osseous lesions are present.         Axial image 163 of series 6 shows that the medial aspect of the spleen has a well marginated geographic area of hypodensity in the subcapsular region. This extends medially and superiorly up to the dome of the left diaphragm axial image 129 series 6.       There is nonspecific fatty stranding between the lateral aspect of the stomach and the spleen. No extracapsular splenic fluid collections.       CT abdomen pelvis:       These are delayed images acquired after the CTA of the chest. IV contrast is being actively excreted symmetrically from both normal-appearing kidneys through bilateral normal-appearing ureters and beginning to fill the normal-appearing urinary bladder.       On the CTA of the chest and this slightly delayed CT of the abdomen the liver has a normal shape and size and density. The gallbladder looks normal as does the pancreas.       Both adrenals look normal.       Aorta and IVC looks normal.       There is no free air, free fluid, lymphadenopathy, or mass in the abdomen or pelvis.       Postoperative changes to the stomach are noted consistent with the recent surgery of gastric sleeve.       The stomach otherwise appears normal. The small bowel appears normal. The colon demonstrates moderate diverticular change of the descending colon. The colon otherwise appears normal and contains radiodense oral contrast.       The uterus and right ovary are normal. The left ovary is normal but contains a 2.5 x 3 cm homogeneous water density cyst.       The bones of the pelvis are within normal limits.           Impression   1.  The contrast filled pulmonary arteries are clear of filling defect. No acute pulmonary arterial embolus is detected. 2.  Triangle shaped opacity in the posterior lateral left lung base likely represents atelectasis but could represent changes of early pneumonia.    3.  Small subcapsular hematoma of the medial aspect of

## 2018-02-25 NOTE — PLAN OF CARE
Problem: Pain:  Goal: Pain level will decrease  Pain level will decrease   Outcome: Ongoing  PT COMPLAINING OF PAIN OF 0-3/10 THIS SHIFT. SCHEDULED AND PRN PAIN MEDICATION GIVEN WHEN AVAILABLE. Problem: Safety:  Goal: Free from accidental physical injury  Free from accidental physical injury   Outcome: Ongoing  PT FREE FROM ACCIDENTAL INJURY THIS SHIFT. Problem: Daily Care:  Goal: Daily care needs are met  Daily care needs are met   Outcome: Ongoing  DAILY CARE NEEDS MET WITH LITTLE ASSISTANCE. Problem: Discharge Planning:  Goal: Patients continuum of care needs are met  Patients continuum of care needs are met   Outcome: Ongoing  PT PLANS HOME AT DISCHARGE. Problem: Falls - Risk of  Goal: Absence of falls  Outcome: Ongoing  PT USES CALL LIGHT APPROPRIATELY WHEN NEEDING ASSISTANCE. UP AD HOLLY. NO FALLS THIS SHIFT. Comments: Care plan reviewed with patient. Patient verbalizes understanding of the plan of care and contribute to goal setting.

## 2018-02-26 LAB — GLUCOSE BLD-MCNC: 65 MG/DL (ref 70–108)

## 2018-02-26 PROCEDURE — 2500000003 HC RX 250 WO HCPCS: Performed by: INTERNAL MEDICINE

## 2018-02-26 PROCEDURE — 6370000000 HC RX 637 (ALT 250 FOR IP): Performed by: SURGERY

## 2018-02-26 PROCEDURE — C9113 INJ PANTOPRAZOLE SODIUM, VIA: HCPCS | Performed by: SURGERY

## 2018-02-26 PROCEDURE — 99024 POSTOP FOLLOW-UP VISIT: CPT | Performed by: SURGERY

## 2018-02-26 PROCEDURE — 1200000000 HC SEMI PRIVATE

## 2018-02-26 PROCEDURE — 6360000002 HC RX W HCPCS: Performed by: SURGERY

## 2018-02-26 PROCEDURE — 2580000003 HC RX 258: Performed by: SURGERY

## 2018-02-26 PROCEDURE — 82948 REAGENT STRIP/BLOOD GLUCOSE: CPT

## 2018-02-26 RX ORDER — 0.9 % SODIUM CHLORIDE 0.9 %
5 VIAL (ML) INJECTION DAILY
Status: DISCONTINUED | OUTPATIENT
Start: 2018-03-03 | End: 2018-03-03

## 2018-02-26 RX ORDER — LEVOTHYROXINE SODIUM ANHYDROUS 100 UG/5ML
62.5 INJECTION, POWDER, LYOPHILIZED, FOR SOLUTION INTRAVENOUS DAILY
Status: DISCONTINUED | OUTPATIENT
Start: 2018-03-03 | End: 2018-03-03

## 2018-02-26 RX ORDER — FLUOXETINE HYDROCHLORIDE 20 MG/5ML
20 LIQUID ORAL DAILY
Status: DISCONTINUED | OUTPATIENT
Start: 2018-02-26 | End: 2018-03-06 | Stop reason: HOSPADM

## 2018-02-26 RX ADMIN — PANTOPRAZOLE SODIUM 40 MG: 40 INJECTION, POWDER, FOR SOLUTION INTRAVENOUS at 16:22

## 2018-02-26 RX ADMIN — Medication 10 ML: at 20:46

## 2018-02-26 RX ADMIN — TAZOBACTAM SODIUM AND PIPERACILLIN SODIUM 3.38 G: 375; 3 INJECTION, SOLUTION INTRAVENOUS at 09:07

## 2018-02-26 RX ADMIN — TAZOBACTAM SODIUM AND PIPERACILLIN SODIUM 3.38 G: 375; 3 INJECTION, SOLUTION INTRAVENOUS at 16:25

## 2018-02-26 RX ADMIN — Medication 10 ML: at 06:25

## 2018-02-26 RX ADMIN — LORAZEPAM 0.25 MG: 2 INJECTION INTRAMUSCULAR; INTRAVENOUS at 20:42

## 2018-02-26 RX ADMIN — Medication 20 MG: at 17:23

## 2018-02-26 RX ADMIN — Medication 10 ML: at 16:22

## 2018-02-26 RX ADMIN — PANTOPRAZOLE SODIUM 40 MG: 40 INJECTION, POWDER, FOR SOLUTION INTRAVENOUS at 06:25

## 2018-02-26 RX ADMIN — LORAZEPAM 0.25 MG: 2 INJECTION INTRAMUSCULAR; INTRAVENOUS at 14:28

## 2018-02-26 RX ADMIN — SODIUM CHLORIDE, POTASSIUM CHLORIDE, SODIUM LACTATE AND CALCIUM CHLORIDE: 600; 310; 30; 20 INJECTION, SOLUTION INTRAVENOUS at 09:07

## 2018-02-26 RX ADMIN — ENOXAPARIN SODIUM 40 MG: 40 INJECTION SUBCUTANEOUS at 20:42

## 2018-02-26 RX ADMIN — Medication 10 ML: at 08:34

## 2018-02-26 RX ADMIN — SODIUM CHLORIDE, POTASSIUM CHLORIDE, SODIUM LACTATE AND CALCIUM CHLORIDE: 600; 310; 30; 20 INJECTION, SOLUTION INTRAVENOUS at 06:41

## 2018-02-26 ASSESSMENT — PAIN SCALES - GENERAL
PAINLEVEL_OUTOF10: 0
PAINLEVEL_OUTOF10: 0
PAINLEVEL_OUTOF10: 5
PAINLEVEL_OUTOF10: 3
PAINLEVEL_OUTOF10: 0

## 2018-02-26 NOTE — PLAN OF CARE
Problem: Pain:  Goal: Pain level will decrease  Pain level will decrease   Outcome: Ongoing  Patient rates pain at a 5 on scale, morphine administered through IV as needed for pain. Problem: Safety:  Goal: Free from accidental physical injury  Free from accidental physical injury   Outcome: Ongoing  Patient free from injuries this shift. Problem: Daily Care:  Goal: Daily care needs are met  Daily care needs are met   Outcome: Ongoing  Daily care needs are being met, patient completes ADL's independently, will call for assistance when needed. Problem: Skin Integrity:  Goal: Skin integrity will stabilize  Skin integrity will stabilize   Outcome: Ongoing  Patient has 6 surgical stab sites on abdomen from prior gastric surgery, steri strips intact, no other skin impairments noted, patient turns and repositions self to prevent skin break down. Problem: Discharge Planning:  Goal: Patients continuum of care needs are met  Patients continuum of care needs are met   Outcome: Ongoing  Pt plans to return home at discharge. Care manager and social working helping with discharge needs. Problem: Falls - Risk of  Goal: Absence of falls  Outcome: Ongoing  Patient free from falls this shift, wearing non-skid socks, ambulates independently to and from bathroom, steady gait, uses call light for assistance. Comments: Care plan reviewed with patient and verbalize understanding of the plan of care and contribute to goal setting.

## 2018-02-26 NOTE — CARE COORDINATION
2/26/18, 11:35 AM      Lula Salinas       Admitted from: ED 2/23/2018/ [facility] day: 3   Location: [facility]26/026- Reason for admit: Fever [R50.9]  Fever [R50.9] Status: inpatient  Admit order signed?: yes  PMH:  has a past medical history of Anxiety; Asthma; Chronic back pain; Depression; Diabetes mellitus ([facility] [address] GERD (gastroesophageal reflux disease); Headache; Hypertension; Infertility, female; Thyroid disease; Ulcer ([address] and UTI (urinary tract infection). Procedure: no  Pertinent abnormal Imaging:no  Medications:  Scheduled Meds:   pantoprazole  40 mg Intravenous BID    And    sodium chloride (PF)  10 mL Intravenous BID    lidocaine 1 % injection  5 mL Intradermal Once    sodium chloride flush  10 mL Intravenous 2 times per day    piperacillin-tazobactam  3.375 g Intravenous Q8H    enoxaparin  40 mg Subcutaneous Daily     Continuous Infusions:   lactated ringers 125 mL/hr at 02/26/18 0907      Pertinent Info/Orders/Treatment Plan:  IV antibiotics per Dr Angelica Dahl. IV fluid hydration. IV pain medication. NPO. PICC to be placed for PPN. Possible percutaneous drain placement. I&O. Ambulate as tolerated. Diet: Diet NPO Effective Now   DVT Prophylaxis: Lovenox  Smoking status:  reports that she quit smoking about 3 years ago. Her smoking use included Cigarettes. She smoked 0.50 packs per day. She has never used smokeless tobacco.   Influenza Vaccination Screening Completed: yes  Pneumonia Vaccination Screening Completed: yes  Core measures: VTE  PCP: Krystal Rodriguez MD  Readmission:  Patient has been readmitted within 9 days. Patient went to f/u appointment? yes  If yes, was it within 7 days? yes  Patient was able to fill prescriptions? yes  Patient is taking medications as prescribed? yes  Cause for readmission?  Fever - post-op intraabdominal abscess  Risk Score: 9.75     Discharge Planning  Current Residence:  Private Residence  Living Arrangements:  Spouse/Significant Other   Support Systems: Spouse/Significant Other  Current Services PTA:   [facility]  Potential Assistance Needed:  N/A  Potential Assistance Purchasing Medications:  No  Does patient want to participate in local refill/ meds to beds program?  No  Type of Home Care Services:  None  Patient expects to be discharged to:  home   Expected Discharge date:  02/26/18  Follow Up Appointment: Best Day/ Time: Tuesday AM    Discharge Plan: I spoke with Nishi Miller and her mom and dad. She is planning to go home at discharge and would like [facility] services. If PPN and or IV antibiotics needed at discharge, she would like [facility]. She said that the doctor told her that she would be here 7-10 days. Will follow.       Evaluation: yes

## 2018-02-26 NOTE — PROGRESS NOTES
There is some mild atelectasis in the left lung base.   The base of the heart is within appropriate limits.       Adjacent to the gastric fundus, there is a new collection which contains fluid and gas. This extends beyond the expected boundaries of the surgical site. This can represent a perforation or a small abscess. This component measures 5.3 x 1.9 cm. This is    best demonstrated on axial image 13 and coronal image 41.        The liver is normal. The spleen is of normal size. There is some fluid and fat stranding along the anterior medial border of the spleen where it abuts the gastric surgical site. There is a possible linear defect in the anterior superior corner of the    spleen. This can represent a small splenic injury.        The gallbladder is normal.    There is no hydronephrosis or stones of either kidney. No renal masses are noted.           No abnormalities of the small bowel loops are noted.  The IVC and aorta are of normal caliber. There is no adenopathy.       The urinary bladder is normal. There is no pelvic free fluid.  There is some dense oral contrast in the colon. There is diverticulosis. There is no evidence of diverticulitis.  There are phleboliths in the pelvis. The uterus and adnexa are within    appropriate limits. No suspicious osseous lesions are identified.                   Impression       1. Changes at the gastric fundus. There is a small area of fluid with adjacent air which appears separate from the gastric lumen. This may represent a perforation versus an abscess. This is new.       2. The anterior medial corner of the spleen may have a small splenic injury. There is some adjacent free fluid and linear hypoenhancement. 3. Diverticulosis         Narrative   PROCEDURE: FL UGI       CLINICAL INFORMATION: Nausea, vomiting, recent gastric surgery.       TECHNIQUE: Preliminary images of the abdomen were obtained.  Following the oral ingestion of dilute Gastrografin and thin barium, the anatomy of the distal esophagus, stomach and duodenum were evaluated in a limited upper GI examination. A total of 19    spot images were obtained. Total fluoroscopy time 1.0 minutes.       COMPARISON: Upper GI examination 2/13/2018       FINDINGS:        Preliminary image demonstrates a nonobstructive bowel gas pattern. Surgical suture material is present in the left upper quadrant of the abdomen. Osseous structures are unremarkable.       There is no stenosis or gastroesophageal reflux in the distal esophagus. No large hiatal hernia is identified. There are surgical changes in the stomach of prior sleeve gastrectomy. The gastric contour is stable compared to the prior study. There is    normal transit of contrast through the stomach and into the duodenum. There is no extravasation of contrast to suggest a leak. The duodenal sweep is normal.           Impression       No evidence of obstruction or leak. Narrative   PROCEDURE: XR CHEST (2 VW)       CLINICAL INFORMATION: Cough and fever       TECHNIQUE: PA and lateral chest radiographs.       COMPARISON: [facility] chest radiograph 2/21/2018       FINDINGS: Cardiomediastinal silhouette is within normal limits. Lungs are clear. Mild endplate changes are present in the thoracic spine. There is contrast from same-day upper GI examination in the upper abdomen. .           Impression       No acute intrathoracic process. Narrative   PROCEDURE: CTA CHEST W WO CONTRAST, CT ABDOMEN PELVIS W IV CONTRAST       CLINICAL INFORMATION: CHEST PAIN, ACUTE, PULMONARY EMBOLISM SUSPECTED, .       COMPARISON: No prior study.       TECHNIQUE: 1.5 mm axial images were obtained through the chest, abdomen and pelvis after the administration of IV contrast.  A non-contrast localizer was obtained.  3D reconstructions were performed on the scanner to include oblique coronal MIP images    through both the right and left pulmonary arteries.  was the intravenous contrast shape and size and density. The gallbladder looks normal as does the pancreas.       Both adrenals look normal.       Aorta and IVC looks normal.       There is no free air, free fluid, lymphadenopathy, or mass in the abdomen or pelvis.       Postoperative changes to the stomach are noted consistent with the recent surgery of gastric sleeve.       The stomach otherwise appears normal. The small bowel appears normal. The colon demonstrates moderate diverticular change of the descending colon. The colon otherwise appears normal and contains radiodense oral contrast.       The uterus and right ovary are normal. The left ovary is normal but contains a 2.5 x 3 cm homogeneous water density cyst.       The bones of the pelvis are within normal limits.           Impression   1.  The contrast filled pulmonary arteries are clear of filling defect. No acute pulmonary arterial embolus is detected. 2.  Triangle shaped opacity in the posterior lateral left lung base likely represents atelectasis but could represent changes of early pneumonia. 3.  Small subcapsular hematoma of the medial aspect of the spleen is favored significantly more than an infarct due to the shape of the area of hypodensity. 4.  The nonspecific stranding between the stomach and spleen is probably postoperative change from the recent gastric sleeve procedure. 5.  No free fluid. No abscess.  No free air.         Electronically signed by Tony Myles MD on 2/26/2018 at 5:08 PM

## 2018-02-27 ENCOUNTER — TELEPHONE (OUTPATIENT)
Dept: BARIATRICS/WEIGHT MGMT | Age: 28
End: 2018-02-27

## 2018-02-27 LAB
ANION GAP SERPL CALCULATED.3IONS-SCNC: 16 MEQ/L (ref 8–16)
BUN BLDV-MCNC: 5 MG/DL (ref 7–22)
CALCIUM SERPL-MCNC: 8.3 MG/DL (ref 8.5–10.5)
CHLORIDE BLD-SCNC: 102 MEQ/L (ref 98–111)
CO2: 22 MEQ/L (ref 23–33)
CREAT SERPL-MCNC: 0.5 MG/DL (ref 0.4–1.2)
GFR SERPL CREATININE-BSD FRML MDRD: > 90 ML/MIN/1.73M2
GLUCOSE BLD-MCNC: 68 MG/DL (ref 70–108)
POTASSIUM SERPL-SCNC: 3.9 MEQ/L (ref 3.5–5.2)
SODIUM BLD-SCNC: 140 MEQ/L (ref 135–145)
T4 FREE: 1.31 NG/DL (ref 0.93–1.76)
TSH SERPL DL<=0.05 MIU/L-ACNC: 3.1 UIU/ML (ref 0.4–4.2)

## 2018-02-27 PROCEDURE — 6360000002 HC RX W HCPCS: Performed by: INTERNAL MEDICINE

## 2018-02-27 PROCEDURE — 80048 BASIC METABOLIC PNL TOTAL CA: CPT

## 2018-02-27 PROCEDURE — 84439 ASSAY OF FREE THYROXINE: CPT

## 2018-02-27 PROCEDURE — 36415 COLL VENOUS BLD VENIPUNCTURE: CPT

## 2018-02-27 PROCEDURE — 99024 POSTOP FOLLOW-UP VISIT: CPT | Performed by: SURGERY

## 2018-02-27 PROCEDURE — C9113 INJ PANTOPRAZOLE SODIUM, VIA: HCPCS | Performed by: SURGERY

## 2018-02-27 PROCEDURE — 84443 ASSAY THYROID STIM HORMONE: CPT

## 2018-02-27 PROCEDURE — 2580000003 HC RX 258: Performed by: INTERNAL MEDICINE

## 2018-02-27 PROCEDURE — 6360000002 HC RX W HCPCS: Performed by: SURGERY

## 2018-02-27 PROCEDURE — 2500000003 HC RX 250 WO HCPCS: Performed by: INTERNAL MEDICINE

## 2018-02-27 PROCEDURE — 1200000000 HC SEMI PRIVATE

## 2018-02-27 PROCEDURE — 2580000003 HC RX 258: Performed by: SURGERY

## 2018-02-27 RX ADMIN — PANTOPRAZOLE SODIUM 40 MG: 40 INJECTION, POWDER, FOR SOLUTION INTRAVENOUS at 17:10

## 2018-02-27 RX ADMIN — TAZOBACTAM SODIUM AND PIPERACILLIN SODIUM 3.38 G: 375; 3 INJECTION, SOLUTION INTRAVENOUS at 00:19

## 2018-02-27 RX ADMIN — LORAZEPAM 0.25 MG: 2 INJECTION INTRAMUSCULAR; INTRAVENOUS at 21:40

## 2018-02-27 RX ADMIN — ENOXAPARIN SODIUM 40 MG: 40 INJECTION SUBCUTANEOUS at 21:39

## 2018-02-27 RX ADMIN — Medication 10 ML: at 21:40

## 2018-02-27 RX ADMIN — Medication 20 MG: at 11:48

## 2018-02-27 RX ADMIN — Medication 10 ML: at 17:10

## 2018-02-27 RX ADMIN — SODIUM CHLORIDE, POTASSIUM CHLORIDE, SODIUM LACTATE AND CALCIUM CHLORIDE: 600; 310; 30; 20 INJECTION, SOLUTION INTRAVENOUS at 21:40

## 2018-02-27 RX ADMIN — PIPERACILLIN SODIUM,TAZOBACTAM SODIUM 3.38 G: 3; .375 INJECTION, POWDER, FOR SOLUTION INTRAVENOUS at 17:54

## 2018-02-27 RX ADMIN — SODIUM CHLORIDE, POTASSIUM CHLORIDE, SODIUM LACTATE AND CALCIUM CHLORIDE: 600; 310; 30; 20 INJECTION, SOLUTION INTRAVENOUS at 14:03

## 2018-02-27 RX ADMIN — PANTOPRAZOLE SODIUM 40 MG: 40 INJECTION, POWDER, FOR SOLUTION INTRAVENOUS at 06:05

## 2018-02-27 RX ADMIN — SODIUM CHLORIDE, POTASSIUM CHLORIDE, SODIUM LACTATE AND CALCIUM CHLORIDE: 600; 310; 30; 20 INJECTION, SOLUTION INTRAVENOUS at 05:57

## 2018-02-27 RX ADMIN — Medication 10 ML: at 06:05

## 2018-02-27 RX ADMIN — TAZOBACTAM SODIUM AND PIPERACILLIN SODIUM 3.38 G: 375; 3 INJECTION, SOLUTION INTRAVENOUS at 09:00

## 2018-02-27 ASSESSMENT — PAIN SCALES - GENERAL
PAINLEVEL_OUTOF10: 0

## 2018-02-27 NOTE — PROGRESS NOTES
Progress note: Infectious diseases    Patient - Angelica Lopez,  Age - 25 y.o.    - 1990      Room Number - 7K-26/026-A   MRN -  [id number]   [facility]  Date of Admission -  2018  1:40 PM    SUBJECTIVE:   No new issues    OBJECTIVE   VITALS    height is 5' 9\" (1.753 m) and weight is 320 lb (145.2 kg) (abnormal). Her oral temperature is 97.9 °F (36.6 °C). Her blood pressure is 144/98 (abnormal) and her pulse is 61. Her respiration is 16 and oxygen saturation is 95%.        Wt Readings from Last 3 Encounters:   18 (!) 320 lb (145.2 kg)   18 (!) 322 lb (146.1 kg)   18 (!) 322 lb 3.2 oz (146.1 kg)       I/O (24 Hours)    Intake/Output Summary (Last 24 hours) at 18 1309  Last data filed at 18 0556   Gross per 24 hour   Intake          2319.66 ml   Output                0 ml   Net          2319.66 ml       General Appearance  Awake, alert, oriented,  not  In acute distress  HEENT - normocephalic, atraumatic, pink conjunctiva,  anicteric sclera  Neck - Supple, no mass  Lungs -  Bilateral good air entry, no rhonchi, no wheeze  Cardiovascular - Heart sounds are normal.    Abdomen - soft, not distended, nontender,   Neurologic -oriented  Skin - No bruising or bleeding  Extremities - No edema, no cyanosis, clubbing     MEDICATIONS:      piperacillin-tazobactam  3.375 g Intravenous Q8H    [START ON 3/3/2018] levothyroxine  62.5 mcg Intravenous Daily    And    [START ON 3/3/2018] sodium chloride (PF)  5 mL Intravenous Daily    FLUoxetine  20 mg Oral Daily    pantoprazole  40 mg Intravenous BID    And    sodium chloride (PF)  10 mL Intravenous BID    lidocaine 1 % injection  5 mL Intradermal Once    sodium chloride flush  10 mL Intravenous 2 times per day    enoxaparin  40 mg Subcutaneous Daily      lactated ringers 125 mL/hr at 18 0557     diphenhydrAMINE **OR**

## 2018-02-27 NOTE — CARE COORDINATION
2/27/18, 2:56 PM      [address] [facility] day: 4  Location: -26/026-A Reason for admit: Fever [R50.9]  Fever [R50.9]  \" Post operative fever felt to be due to abscess intraabdominal\", per Dr Elvia Diaz. Procedure:   (2/12/18 Robotic sleeve gastrectomy by Dr Matt Mendoza)  2/25/18 PICC line inserted  2/26/17 IR unable to do percutaneous drain placement due to size/ location. Treatment Plan of Care: IV antibiotics per Dr Elvia Diaz. IV fluid hydration. IV pain medication. NPO. PICC inserted for TPN. Await TPN orders. I called pharmacy to give them heads up on [address], pharmacist, said TPN cannot be started until tomorrow. No premix formula available. Shortage of TPN. I discussed with [place] dietitian. I&O. Ambulate as tolerated. Plan noted for CT scan of abdomen/ pelvis  Friday/ Saturday. Core Measures: vte  PCP: Bucky Diaz MD  Discharge Plan:  I called [facility] to check on benefits for home TPN, if ordered. [facility] will provide home TPN for Lula if ordered at discharge. She is planning to go home with her parents at discharge. [facility] services. Yesterday she told me that doctor told her she would be here 7-10 days. ... Likely no discharge before Monday.

## 2018-02-27 NOTE — TELEPHONE ENCOUNTER
2/23/2018 -Returned call to Dr. Brittany Hanna office- Dr. Stefania Pickard on phone expressed concerns regarding Delora-I related to Dr. Stefania Pickard that our office had been in contact daily with Lula-IV fluids were ordered, Dr. Francisca Buckley has ordered urine and blood cultures and upper GI to be done this morning. Dr. Stefania Pickard did speak with Dr. Francisca Buckley during this phone conversation. He indicated the care he ws providing.

## 2018-02-27 NOTE — PROGRESS NOTES
clear to ausculation, without wheezes, rales or rhonci  HEART: normal rate and regular rhythm  ABDOMEN: non-distended, soft, incisional tenderness, bowel sounds present. No guarding or peritoneal signs  INCISION: healing well, no significant drainage, no significant erythema  EXTERMITY: no cyanosis, clubbing or edema    In: 2090.3 [I.V.:2090.3]  Out: -     Date 02/27/18 0000 - 02/27/18 2359   Shift [id number] 2953-3882 0669-0642 24 Hour Total   I  N  T  A  K  E   P.O.  (mL/kg/hr) 0   0    I.V.  (mL/kg) 1040  (7.2)   1040  (7.2)    Shift Total  (mL/kg) 1040  (7.2)   1040  (7.2)   O  U  T  P  U  T   Shift Total  (mL/kg)       Weight (kg) 145. 1 145. 1 145. 1 145.1     LABS     Recent Labs      02/25/18   0636  02/27/18   0645   WBC  3.2*   --    HGB  11.7*   --    HCT  35.5*   --    PLT  124*   --    NA  140  140   K  3.5  3.9   CL  102  102   CO2  23  22*   BUN  6*  5*   CREATININE  0.8  0.5   CALCIUM  8.6  8.3*      No results for input(s): PTT, INR in the last 72 hours. Invalid input(s): PT  No results for input(s): AST, ALT, BILITOT, BILIDIR, AMYLASE, LIPASE, LDH, LACTA in the last 72 hours. No results for input(s): TROPONINT in the last 72 hours. RADIOLOGY      Narrative   PROCEDURE: CT ABDOMEN PELVIS W IV CONTRAST       CLINICAL INFORMATION: ABD PAIN, FEVER, POST-OP, NO ABSCESS ON CT WITHIN THE WEEK,  . Mid abdominal pain with fever, nausea and vomiting.  Gastric sleeve surgery 2 weeks ago.       COMPARISON: 2/15/2018.       TECHNIQUE: 5 mm axial CT images were obtained through the abdomen and pelvis after the administration of intravenous and oral contrast. Coronal and sagittal reconstructions were obtained.       All CT scans at this facility use dose modulation, iterative reconstruction, and/or weight-based dosing when appropriate to reduce radiation dose to as low as reasonably achievable.       FINDINGS:           There is some mild atelectasis in the left lung base.   The base of the heart is within appropriate limits.       Adjacent to the gastric fundus, there is a new collection which contains fluid and gas. This extends beyond the expected boundaries of the surgical site. This can represent a perforation or a small abscess. This component measures 5.3 x 1.9 cm. This is    best demonstrated on axial image 13 and coronal image 41.        The liver is normal. The spleen is of normal size. There is some fluid and fat stranding along the anterior medial border of the spleen where it abuts the gastric surgical site. There is a possible linear defect in the anterior superior corner of the    spleen. This can represent a small splenic injury.        The gallbladder is normal.    There is no hydronephrosis or stones of either kidney. No renal masses are noted.           No abnormalities of the small bowel loops are noted.  The IVC and aorta are of normal caliber. There is no adenopathy.       The urinary bladder is normal. There is no pelvic free fluid.  There is some dense oral contrast in the colon. There is diverticulosis. There is no evidence of diverticulitis.  There are phleboliths in the pelvis. The uterus and adnexa are within    appropriate limits. No suspicious osseous lesions are identified.                   Impression       1. Changes at the gastric fundus. There is a small area of fluid with adjacent air which appears separate from the gastric lumen. This may represent a perforation versus an abscess. This is new.       2. The anterior medial corner of the spleen may have a small splenic injury. There is some adjacent free fluid and linear hypoenhancement. 3. Diverticulosis         Narrative   PROCEDURE: FL UGI       CLINICAL INFORMATION: Nausea, vomiting, recent gastric surgery.       TECHNIQUE: Preliminary images of the abdomen were obtained.  Following the oral ingestion of dilute Gastrografin and thin barium, the anatomy of the distal esophagus, stomach and duodenum were evaluated in a limited upper GI examination. A total of 19    spot images were obtained. Total fluoroscopy time 1.0 minutes.       COMPARISON: Upper GI examination 2/13/2018       FINDINGS:        Preliminary image demonstrates a nonobstructive bowel gas pattern. Surgical suture material is present in the left upper quadrant of the abdomen. Osseous structures are unremarkable.       There is no stenosis or gastroesophageal reflux in the distal esophagus. No large hiatal hernia is identified. There are surgical changes in the stomach of prior sleeve gastrectomy. The gastric contour is stable compared to the prior study. There is    normal transit of contrast through the stomach and into the duodenum. There is no extravasation of contrast to suggest a leak. The duodenal sweep is normal.           Impression       No evidence of obstruction or leak. Narrative   PROCEDURE: XR CHEST (2 VW)       CLINICAL INFORMATION: Cough and fever       TECHNIQUE: PA and lateral chest radiographs.       COMPARISON: [facility] chest radiograph 2/21/2018       FINDINGS: Cardiomediastinal silhouette is within normal limits. Lungs are clear. Mild endplate changes are present in the thoracic spine. There is contrast from same-day upper GI examination in the upper abdomen. .           Impression       No acute intrathoracic process. Narrative   PROCEDURE: CTA CHEST W WO CONTRAST, CT ABDOMEN PELVIS W IV CONTRAST       CLINICAL INFORMATION: CHEST PAIN, ACUTE, PULMONARY EMBOLISM SUSPECTED, .       COMPARISON: No prior study.       TECHNIQUE: 1.5 mm axial images were obtained through the chest, abdomen and pelvis after the administration of IV contrast.  A non-contrast localizer was obtained.  3D reconstructions were performed on the scanner to include oblique coronal MIP images    through both the right and left pulmonary arteries.  was the intravenous contrast utilized.       All CT scans at this facility use dose modulation, iterative reconstruction, and/or look normal.       Aorta and IVC looks normal.       There is no free air, free fluid, lymphadenopathy, or mass in the abdomen or pelvis.       Postoperative changes to the stomach are noted consistent with the recent surgery of gastric sleeve.       The stomach otherwise appears normal. The small bowel appears normal. The colon demonstrates moderate diverticular change of the descending colon. The colon otherwise appears normal and contains radiodense oral contrast.       The uterus and right ovary are normal. The left ovary is normal but contains a 2.5 x 3 cm homogeneous water density cyst.       The bones of the pelvis are within normal limits.           Impression   1.  The contrast filled pulmonary arteries are clear of filling defect. No acute pulmonary arterial embolus is detected. 2.  Triangle shaped opacity in the posterior lateral left lung base likely represents atelectasis but could represent changes of early pneumonia. 3.  Small subcapsular hematoma of the medial aspect of the spleen is favored significantly more than an infarct due to the shape of the area of hypodensity. 4.  The nonspecific stranding between the stomach and spleen is probably postoperative change from the recent gastric sleeve procedure. 5.  No free fluid. No abscess.  No free air.         Electronically signed by Yanique Archuleta MD on 2/27/2018 at 7:28 AM

## 2018-02-27 NOTE — PROGRESS NOTES
Nutrition Assessment    Type and Reason for Visit: Initial (Per Daniel Ann care manager TPN to start. )    Nutrition Recommendations:  If 3:1 TPN started, please dose on 66 kg & start with 10 kcals/kg, 1.2 grams protein/kg & 20% lipid kcals. Diet start per Dr as pt able. Malnutrition Assessment:  · Malnutrition Status: Insufficient data    Nutrition Diagnosis:   · Problem: Inadequate oral intake  · Etiology: related to Alteration in GI function     Signs and symptoms:  as evidenced by NPO status due to medical condition    Nutrition Assessment:  · Subjective Assessment: Pt s/p gastric sleeve 2/12 admitted with fever & question intraabdominal abscess & ? gastric  leak. Per Daniel Ann care manager PICC is placed , plan TPN & she has contacted surgery as no official order yet & has also contacted [place] who reports Premix TPN is out ot stock & back ordered, & TPN will not be able to be started until tomorrow. Pt is NPO. Recommend PO4 & Mg++ check.  BUN 5, glucose 68  · Wound Type:  (s/p 2/12 OR gastric sleeve)  · Current Nutrition Therapies:  · Oral Diet Orders: NPO   · Anthropometric Measures:  · Ht: 5' 9\" (175.3 cm)   · Current Body Wt: 320 lb (145.2 kg) (2/23 stated)  · Admission Body Wt: 320 lb (145.2 kg) (2/23 stated )  · Usual Body Wt:  (2/5/18 250#, 2/12/18 335#)  · % Weight Change: intentional wt loss,     · Ideal Body Wt: 145 lb (65.8 kg),    · BMI Classification: BMI > or equal to 40.0 Obese Class III (47.5 kg)  · Comparative Standards (Estimated Nutrition Needs):  · Estimated Daily Total Kcal: ~1647 -1779 kcals ( 25-27 kcals/kg IBW of 65.9 kg)  · Estimated Daily Protein (g): ~ grams protein ( 1.5-2 grams protein/kg on IBW of 65.9 kg)    Estimated Intake vs Estimated Needs: Intake Less Than Needs    Nutrition Risk Level:  high    Nutrition Interventions:    (Possible TPN start)  Continued Inpatient Monitoring, Education not appropriate at this time    Nutrition Evaluation:   · Evaluation:

## 2018-02-28 LAB
ANION GAP SERPL CALCULATED.3IONS-SCNC: 18 MEQ/L (ref 8–16)
BUN BLDV-MCNC: 5 MG/DL (ref 7–22)
CALCIUM IONIZED: 1.14 MMOL/L (ref 1.12–1.32)
CALCIUM SERPL-MCNC: 8.4 MG/DL (ref 8.5–10.5)
CHLORIDE BLD-SCNC: 101 MEQ/L (ref 98–111)
CO2: 21 MEQ/L (ref 23–33)
CREAT SERPL-MCNC: 0.5 MG/DL (ref 0.4–1.2)
GFR SERPL CREATININE-BSD FRML MDRD: > 90 ML/MIN/1.73M2
GLUCOSE BLD-MCNC: 65 MG/DL (ref 70–108)
GLUCOSE BLD-MCNC: 88 MG/DL (ref 70–108)
MAGNESIUM: 1.4 MG/DL (ref 1.6–2.4)
PHOSPHORUS: 2.9 MG/DL (ref 2.4–4.7)
POTASSIUM SERPL-SCNC: 4 MEQ/L (ref 3.5–5.2)
SODIUM BLD-SCNC: 140 MEQ/L (ref 135–145)

## 2018-02-28 PROCEDURE — C9113 INJ PANTOPRAZOLE SODIUM, VIA: HCPCS | Performed by: SURGERY

## 2018-02-28 PROCEDURE — 2580000003 HC RX 258: Performed by: INTERNAL MEDICINE

## 2018-02-28 PROCEDURE — 83735 ASSAY OF MAGNESIUM: CPT

## 2018-02-28 PROCEDURE — 6360000002 HC RX W HCPCS

## 2018-02-28 PROCEDURE — 2500000003 HC RX 250 WO HCPCS

## 2018-02-28 PROCEDURE — 80048 BASIC METABOLIC PNL TOTAL CA: CPT

## 2018-02-28 PROCEDURE — 82330 ASSAY OF CALCIUM: CPT

## 2018-02-28 PROCEDURE — 6360000002 HC RX W HCPCS: Performed by: SURGERY

## 2018-02-28 PROCEDURE — 36415 COLL VENOUS BLD VENIPUNCTURE: CPT

## 2018-02-28 PROCEDURE — 2580000003 HC RX 258: Performed by: SURGERY

## 2018-02-28 PROCEDURE — 84100 ASSAY OF PHOSPHORUS: CPT

## 2018-02-28 PROCEDURE — 1200000000 HC SEMI PRIVATE

## 2018-02-28 PROCEDURE — 99024 POSTOP FOLLOW-UP VISIT: CPT | Performed by: SURGERY

## 2018-02-28 PROCEDURE — 82948 REAGENT STRIP/BLOOD GLUCOSE: CPT

## 2018-02-28 PROCEDURE — 6360000002 HC RX W HCPCS: Performed by: INTERNAL MEDICINE

## 2018-02-28 RX ORDER — MAGNESIUM SULFATE IN WATER 40 MG/ML
2 INJECTION, SOLUTION INTRAVENOUS ONCE
Status: COMPLETED | OUTPATIENT
Start: 2018-02-28 | End: 2018-02-28

## 2018-02-28 RX ORDER — HYDRALAZINE HYDROCHLORIDE 20 MG/ML
10 INJECTION INTRAMUSCULAR; INTRAVENOUS EVERY 6 HOURS
Status: DISCONTINUED | OUTPATIENT
Start: 2018-02-28 | End: 2018-03-06 | Stop reason: HOSPADM

## 2018-02-28 RX ORDER — DEXTROSE MONOHYDRATE 50 MG/ML
100 INJECTION, SOLUTION INTRAVENOUS PRN
Status: DISCONTINUED | OUTPATIENT
Start: 2018-02-28 | End: 2018-03-06 | Stop reason: HOSPADM

## 2018-02-28 RX ORDER — DEXTROSE MONOHYDRATE 25 G/50ML
12.5 INJECTION, SOLUTION INTRAVENOUS PRN
Status: DISCONTINUED | OUTPATIENT
Start: 2018-02-28 | End: 2018-03-06 | Stop reason: HOSPADM

## 2018-02-28 RX ORDER — NICOTINE POLACRILEX 4 MG
15 LOZENGE BUCCAL PRN
Status: DISCONTINUED | OUTPATIENT
Start: 2018-02-28 | End: 2018-03-06 | Stop reason: HOSPADM

## 2018-02-28 RX ADMIN — PANTOPRAZOLE SODIUM 40 MG: 40 INJECTION, POWDER, FOR SOLUTION INTRAVENOUS at 05:51

## 2018-02-28 RX ADMIN — PIPERACILLIN SODIUM,TAZOBACTAM SODIUM 3.38 G: 3; .375 INJECTION, POWDER, FOR SOLUTION INTRAVENOUS at 23:50

## 2018-02-28 RX ADMIN — SODIUM CHLORIDE, POTASSIUM CHLORIDE, SODIUM LACTATE AND CALCIUM CHLORIDE: 600; 310; 30; 20 INJECTION, SOLUTION INTRAVENOUS at 08:18

## 2018-02-28 RX ADMIN — PIPERACILLIN SODIUM,TAZOBACTAM SODIUM 3.38 G: 3; .375 INJECTION, POWDER, FOR SOLUTION INTRAVENOUS at 00:12

## 2018-02-28 RX ADMIN — Medication 20 MG: at 15:41

## 2018-02-28 RX ADMIN — PANTOPRAZOLE SODIUM 40 MG: 40 INJECTION, POWDER, FOR SOLUTION INTRAVENOUS at 16:50

## 2018-02-28 RX ADMIN — Medication 10 ML: at 05:51

## 2018-02-28 RX ADMIN — ONDANSETRON 4 MG: 2 INJECTION INTRAMUSCULAR; INTRAVENOUS at 21:39

## 2018-02-28 RX ADMIN — CALCIUM GLUCONATE: 94 INJECTION, SOLUTION INTRAVENOUS at 18:41

## 2018-02-28 RX ADMIN — PIPERACILLIN SODIUM,TAZOBACTAM SODIUM 3.38 G: 3; .375 INJECTION, POWDER, FOR SOLUTION INTRAVENOUS at 16:50

## 2018-02-28 RX ADMIN — MAGNESIUM SULFATE HEPTAHYDRATE 2 G: 40 INJECTION, SOLUTION INTRAVENOUS at 11:20

## 2018-02-28 RX ADMIN — ENOXAPARIN SODIUM 40 MG: 40 INJECTION SUBCUTANEOUS at 20:44

## 2018-02-28 RX ADMIN — Medication 10 ML: at 16:50

## 2018-02-28 RX ADMIN — DIPHENHYDRAMINE HYDROCHLORIDE 25 MG: 50 INJECTION, SOLUTION INTRAMUSCULAR; INTRAVENOUS at 02:24

## 2018-02-28 RX ADMIN — LORAZEPAM 0.25 MG: 2 INJECTION INTRAMUSCULAR; INTRAVENOUS at 20:41

## 2018-02-28 RX ADMIN — SODIUM CHLORIDE, POTASSIUM CHLORIDE, SODIUM LACTATE AND CALCIUM CHLORIDE: 600; 310; 30; 20 INJECTION, SOLUTION INTRAVENOUS at 18:49

## 2018-02-28 RX ADMIN — DIPHENHYDRAMINE HYDROCHLORIDE 25 MG: 50 INJECTION, SOLUTION INTRAMUSCULAR; INTRAVENOUS at 23:45

## 2018-02-28 RX ADMIN — HYDRALAZINE HYDROCHLORIDE 10 MG: 20 INJECTION INTRAMUSCULAR; INTRAVENOUS at 20:44

## 2018-02-28 RX ADMIN — PIPERACILLIN SODIUM,TAZOBACTAM SODIUM 3.38 G: 3; .375 INJECTION, POWDER, FOR SOLUTION INTRAVENOUS at 08:18

## 2018-02-28 ASSESSMENT — PAIN SCALES - GENERAL
PAINLEVEL_OUTOF10: 0

## 2018-02-28 NOTE — FLOWSHEET NOTE
02/27/18 1715   Encounter Summary   Services provided to: Patient and family together   Continue Visiting Yes  (2/27 continue support)   Volunteer Visit Yes  Huan Hubbard)   Routine   Type Initial

## 2018-02-28 NOTE — PROGRESS NOTES
coronal MIP images    through both the right and left pulmonary arteries. was the intravenous contrast utilized.       All CT scans at this facility use dose modulation, iterative reconstruction, and/or weight-based dosing when appropriate to reduce radiation dose to as low as reasonably achievable.       FINDINGS:         CT chest       There is adequate opacification of the pulmonary arterial system. No pulmonary emboli are present.  The aorta is within acceptable limits.       There is a pleural-based parenchymal opacity with air bronchograms beginning in the posterior lateral left costophrenic angle and extending into the lateral basilar segment of the left lower lobe. Continuous with this triangular-shaped density is    platelike atelectasis extending up through the anterior aspect of the superior segment of the left lower lobe. Additionally there is a bandlike density extending from the parenchymal consolidation through the medial basilar segment of the left lower    lobe.       The remaining lungs are clear.       No pneumothorax.       The airways are patent.       The heart size is normal. There is no pericardial or pleural effusion. Merlyn Maze is no mediastinal, axillary or hilar adenopathy.       No suspicious osseous lesions are present.         Axial image 163 of series 6 shows that the medial aspect of the spleen has a well marginated geographic area of hypodensity in the subcapsular region. This extends medially and superiorly up to the dome of the left diaphragm axial image 129 series 6.       There is nonspecific fatty stranding between the lateral aspect of the stomach and the spleen.  No extracapsular splenic fluid collections.       CT abdomen pelvis:       These are delayed images acquired after the CTA of the chest. IV contrast is being actively excreted symmetrically from both normal-appearing kidneys through bilateral normal-appearing ureters and beginning to fill the normal-appearing urinary

## 2018-03-01 ENCOUNTER — ANESTHESIA EVENT (OUTPATIENT)
Dept: OPERATING ROOM | Age: 28
DRG: 711 | End: 2018-03-01
Payer: COMMERCIAL

## 2018-03-01 ENCOUNTER — APPOINTMENT (OUTPATIENT)
Dept: CT IMAGING | Age: 28
DRG: 711 | End: 2018-03-01
Payer: COMMERCIAL

## 2018-03-01 LAB
ANION GAP SERPL CALCULATED.3IONS-SCNC: 16 MEQ/L (ref 8–16)
BLOOD CULTURE, ROUTINE: NORMAL
BLOOD CULTURE, ROUTINE: NORMAL
BUN BLDV-MCNC: 6 MG/DL (ref 7–22)
CALCIUM IONIZED: 1.11 MMOL/L (ref 1.12–1.32)
CALCIUM SERPL-MCNC: 8.7 MG/DL (ref 8.5–10.5)
CHLORIDE BLD-SCNC: 100 MEQ/L (ref 98–111)
CO2: 25 MEQ/L (ref 23–33)
CREAT SERPL-MCNC: 0.6 MG/DL (ref 0.4–1.2)
GFR SERPL CREATININE-BSD FRML MDRD: > 90 ML/MIN/1.73M2
GLUCOSE BLD-MCNC: 100 MG/DL (ref 70–108)
GLUCOSE BLD-MCNC: 101 MG/DL (ref 70–108)
GLUCOSE BLD-MCNC: 88 MG/DL (ref 70–108)
GLUCOSE BLD-MCNC: 93 MG/DL (ref 70–108)
MAGNESIUM: 2 MG/DL (ref 1.6–2.4)
PHOSPHORUS: 2.8 MG/DL (ref 2.4–4.7)
POTASSIUM SERPL-SCNC: 3.9 MEQ/L (ref 3.5–5.2)
SODIUM BLD-SCNC: 141 MEQ/L (ref 135–145)

## 2018-03-01 PROCEDURE — 80048 BASIC METABOLIC PNL TOTAL CA: CPT

## 2018-03-01 PROCEDURE — 82948 REAGENT STRIP/BLOOD GLUCOSE: CPT

## 2018-03-01 PROCEDURE — 36415 COLL VENOUS BLD VENIPUNCTURE: CPT

## 2018-03-01 PROCEDURE — 74177 CT ABD & PELVIS W/CONTRAST: CPT

## 2018-03-01 PROCEDURE — 6370000000 HC RX 637 (ALT 250 FOR IP)

## 2018-03-01 PROCEDURE — 6360000002 HC RX W HCPCS: Performed by: SURGERY

## 2018-03-01 PROCEDURE — 6360000002 HC RX W HCPCS: Performed by: INTERNAL MEDICINE

## 2018-03-01 PROCEDURE — 1200000000 HC SEMI PRIVATE

## 2018-03-01 PROCEDURE — 84100 ASSAY OF PHOSPHORUS: CPT

## 2018-03-01 PROCEDURE — 99024 POSTOP FOLLOW-UP VISIT: CPT | Performed by: SURGERY

## 2018-03-01 PROCEDURE — C9113 INJ PANTOPRAZOLE SODIUM, VIA: HCPCS | Performed by: SURGERY

## 2018-03-01 PROCEDURE — 2580000003 HC RX 258: Performed by: SURGERY

## 2018-03-01 PROCEDURE — 6360000004 HC RX CONTRAST MEDICATION: Performed by: SURGERY

## 2018-03-01 PROCEDURE — 2580000003 HC RX 258: Performed by: INTERNAL MEDICINE

## 2018-03-01 PROCEDURE — 82330 ASSAY OF CALCIUM: CPT

## 2018-03-01 PROCEDURE — 2500000003 HC RX 250 WO HCPCS: Performed by: SURGERY

## 2018-03-01 PROCEDURE — 83735 ASSAY OF MAGNESIUM: CPT

## 2018-03-01 RX ORDER — LORAZEPAM 2 MG/ML
0.5 INJECTION INTRAMUSCULAR EVERY 6 HOURS PRN
Status: DISCONTINUED | OUTPATIENT
Start: 2018-03-01 | End: 2018-03-06 | Stop reason: HOSPADM

## 2018-03-01 RX ADMIN — LORAZEPAM 0.25 MG: 2 INJECTION INTRAMUSCULAR; INTRAVENOUS at 02:37

## 2018-03-01 RX ADMIN — HYDRALAZINE HYDROCHLORIDE 10 MG: 20 INJECTION INTRAMUSCULAR; INTRAVENOUS at 02:40

## 2018-03-01 RX ADMIN — SODIUM CHLORIDE, POTASSIUM CHLORIDE, SODIUM LACTATE AND CALCIUM CHLORIDE: 600; 310; 30; 20 INJECTION, SOLUTION INTRAVENOUS at 02:37

## 2018-03-01 RX ADMIN — CALCIUM GLUCONATE: 94 INJECTION, SOLUTION INTRAVENOUS at 20:12

## 2018-03-01 RX ADMIN — LORAZEPAM 0.5 MG: 2 INJECTION INTRAMUSCULAR; INTRAVENOUS at 20:13

## 2018-03-01 RX ADMIN — SODIUM CHLORIDE, POTASSIUM CHLORIDE, SODIUM LACTATE AND CALCIUM CHLORIDE: 600; 310; 30; 20 INJECTION, SOLUTION INTRAVENOUS at 22:14

## 2018-03-01 RX ADMIN — ENOXAPARIN SODIUM 40 MG: 40 INJECTION SUBCUTANEOUS at 20:13

## 2018-03-01 RX ADMIN — DIPHENHYDRAMINE HYDROCHLORIDE 50 MG: 50 INJECTION, SOLUTION INTRAMUSCULAR; INTRAVENOUS at 22:45

## 2018-03-01 RX ADMIN — IOPAMIDOL 80 ML: 755 INJECTION, SOLUTION INTRAVENOUS at 11:09

## 2018-03-01 RX ADMIN — Medication 20 MG: at 11:23

## 2018-03-01 RX ADMIN — Medication 10 ML: at 10:46

## 2018-03-01 RX ADMIN — PANTOPRAZOLE SODIUM 40 MG: 40 INJECTION, POWDER, FOR SOLUTION INTRAVENOUS at 17:11

## 2018-03-01 RX ADMIN — Medication 10 ML: at 17:12

## 2018-03-01 RX ADMIN — SODIUM CHLORIDE, POTASSIUM CHLORIDE, SODIUM LACTATE AND CALCIUM CHLORIDE: 600; 310; 30; 20 INJECTION, SOLUTION INTRAVENOUS at 12:52

## 2018-03-01 RX ADMIN — PIPERACILLIN SODIUM,TAZOBACTAM SODIUM 3.38 G: 3; .375 INJECTION, POWDER, FOR SOLUTION INTRAVENOUS at 08:43

## 2018-03-01 RX ADMIN — Medication 10 ML: at 10:45

## 2018-03-01 RX ADMIN — PIPERACILLIN SODIUM,TAZOBACTAM SODIUM 3.38 G: 3; .375 INJECTION, POWDER, FOR SOLUTION INTRAVENOUS at 17:11

## 2018-03-01 RX ADMIN — PANTOPRAZOLE SODIUM 40 MG: 40 INJECTION, POWDER, FOR SOLUTION INTRAVENOUS at 05:37

## 2018-03-01 RX ADMIN — Medication 10 ML: at 05:38

## 2018-03-01 ASSESSMENT — PAIN SCALES - GENERAL
PAINLEVEL_OUTOF10: 0

## 2018-03-01 NOTE — PROGRESS NOTES
Nutrition Assessment    Type and Reason for Visit: Reassess (TPN ordering and management)    Nutrition Recommendations: Next bag of TPN change to 15 kcals/kgm, 1.5 grams protein/kgm, 30% lipid kcals, dosing wt. 66kgm. Recommend daily weights. Diet per MD.     Malnutrition Assessment:  · Malnutrition Status: At risk for malnutrition    Nutrition Diagnosis:   · Problem: Inadequate oral intake  · Etiology: related to Alteration in GI function     Signs and symptoms:  as evidenced by NPO status due to medical condition, Nutrition support - PN    Nutrition Assessment:  · Subjective Assessment: Pt. s/p gastric sleeve 2/12; TPN infusing at 80ml/hour this am via PICC - IV RN evaluated PICC and found no problem with ports; meds include ATB; BM x1 2/28; glucose 101, phosphorus 2.8, BUN 6, creatinine 0.6; pt. asleep this am; NPO except ice chips; will increase TPN macronutrients as above   · Wound Type:  (s/p 2/12 OR gastric sleeve)  · Current Nutrition Therapies:  · Oral Diet Orders: NPO (except ice chips)   · Parenteral Nutrition Orders:  · Type and Formula: 3-in-1 Custom (10 kcals/kgm, 1.2 grams protein/kgm, 20% lipid kcals, dosing wt. 66kgm)   · Lipids: Daily  · Additives: per pharmacy  · Rate/Volume: 80ml/hour  · Duration: Continuous 24 hrs  · Current PN Order Provides: 660 kcals, 79 grams protein, 62 grams CHO/24h  ·  Next bag change to 15 kcals/kgm, 1.5 grams protein/kgm, 30% lipid kcals, dosing wt.  66kgm to provide 990 kcals, 99 grams protein, 87 grams CHO/24h  · Anthropometric Measures:  · Ht: 5' 9\" (175.3 cm)   · Current Body Wt: 320 lb (145.2 kg) (2/23 stated)  · Admission Body Wt: 320 lb (145.2 kg) (2/23 stated )  · Usual Body Wt:  (2/5/18 250#, 2/12/18 335#)  · % Weight Change: intentional wt loss,     · Ideal Body Wt: 145 lb (65.8 kg),   · BMI Classification: BMI > or equal to 40.0 Obese Class III (47.4)  · Comparative Standards (Estimated Nutrition Needs):  · Estimated Daily Total Kcal: ~1647 -1779 kcals (

## 2018-03-01 NOTE — PROGRESS NOTES
TPN Follow Up Note    Assessment:    improving, but is stil NPO  POD # 17 S/P robotic sleeve gastrectomy. Possible gastric sleeve leak. Electrolyte Replacement: None   iCa = 1.11     TPN changes for (today) at 1800:   Per dietary, Next bag of TPN change to 15 kcals/kgm, 1.5 grams protein/kgm, 30% lipid kcals, dosing wt. 66kgm  Increase CaGluconate to 11.625mEq    Re-check BMP, iCa 3/2.

## 2018-03-01 NOTE — PROGRESS NOTES
Patient awake in bed. Lung sounds are clear and bowel sounds are hypoactive. Patient is NPO and occasionally having ice chips, as permitted. Patient reports no pain in abdomen. Stab sites in abdomen have not changed, they are light pink and well approximated with 3 of them having intact steri strips. Patient reports having no further requests at this time. Call light and bedside table in reach, bathroom offered, pathway clear. [facility].

## 2018-03-01 NOTE — PROGRESS NOTES
CT scan reviewed with radiology. Fluid collection about the same size but still unable to place percutaneous drain. Unfortunately, no oral contrast given. Discussed case also with Dr. Nancy Ortiz of GI. Long discussion with patient and mother in regards to options. Patient remained stable and afebrile. Still concerned that probable leak superior lateral sleeve staple line. After further discussion with patient and mother we have elected to proceed with robotic exploration with washout and drain placement tomorrow and then probable upper endoscopy with covered stent placement by GI. Risks, benefits and alternatives discussed in detail. All questions answered. Patient and mother agree with plan.

## 2018-03-01 NOTE — PLAN OF CARE
Problem: Pain:  Goal: Pain level will decrease  Pain level will decrease   Outcome: Ongoing  Pt rated pain 0 out of 10. PRN pain medication offered when available. White board updated when given. Pt verbalized understanding of available pain medications. Goal: Control of acute pain  Control of acute pain   Outcome: Ongoing  Pt rated pain 0 out of 10. PRN pain medication offered when available. White board updated when given. Pt verbalized understanding of available pain medications. Goal: Control of chronic pain  Control of chronic pain   Outcome: Ongoing  Pt rated pain 0 out of 10. PRN pain medication offered when available. White board updated when given. Pt verbalized understanding of available pain medications. Problem: Safety:  Goal: Free from accidental physical injury  Free from accidental physical injury   Outcome: Ongoing  Pt free from accidental physical injury this shift. Problem: Daily Care:  Goal: Daily care needs are met  Daily care needs are met   Outcome: Ongoing  Pt does ADL's independently and with her moms assist.     Problem: Skin Integrity:  Goal: Skin integrity will stabilize  Skin integrity will stabilize   Outcome: Ongoing  Abdominal surgical sites ANGEL. Problem: Discharge Planning:  Goal: Patients continuum of care needs are met  Patients continuum of care needs are met   Outcome: Ongoing  Pt remains on 7K, discharge planning in progress. Pt plans to return home at discharge. Problem: Falls - Risk of  Goal: Absence of falls  Outcome: Ongoing  Patient free from falls this shift. Bed in low position with wheels locked and side rails up x2. Call light in reach. Will continue to monitor. Problem: DISCHARGE BARRIERS  Goal: Patient's continuum of care needs are met  Outcome: Ongoing  Pt denies discharge needs. Problem: Nutrition  Goal: Optimal nutrition therapy  Outcome: Ongoing  Pt NPO except ice chips. Comments: Care plan reviewed with patient and family.

## 2018-03-01 NOTE — PLAN OF CARE
Problem: Nutrition  Goal: Optimal nutrition therapy  Outcome: Ongoing  Nutrition Problem: Inadequate oral intake  Intervention: Food and/or Nutrient Delivery: Continue NPO, Modify current Parenteral Nutrition  Nutritional Goals: TPN will provide % of nutrient needs until appropriate to transition to po diet

## 2018-03-01 NOTE — PROGRESS NOTES
Patient resting in bed. Alert and oriented x4. PERRL 4mm to 3mm. Speech appropriate and clear. Skin pink, warm, and dry. Lung sounds clear with symmetrical chest movement. Heart sounds clear with regular rate and rhythm. Bilateral radial pulses present, 2+. Capillary refill <3 seconds. Skin turgor <3 seconds. Hand grasp strong and equal bilaterally. 6 stab sites in abdomen r/t surgery, 3 with intact steri strips. Other 4 healing well without redness or inflammation. Bowel sounds are hypoactive. Abdomen is nontender with no masses palpated. Patient reports no pain upon palpation. Skin intact over bony prominences on rear and back. Skin in lower extremities is pink, warm, and dry. Capillary refill <3 seconds. Pedal pulses palpated and present bilaterally, 2+. Pedal push and pull strong and equal bilaterally. Call light and bedside table within reach. Patient reports no further needs at this time. Tia Archuleta [facility].

## 2018-03-01 NOTE — PROGRESS NOTES
picc line has been positional for about the last half of the shift, this morning upon entering iv pump was reading occluded for both lines, attempted to flush both lines with no success, iv team called, message left

## 2018-03-02 ENCOUNTER — ANESTHESIA (OUTPATIENT)
Dept: OPERATING ROOM | Age: 28
DRG: 711 | End: 2018-03-02
Payer: COMMERCIAL

## 2018-03-02 ENCOUNTER — APPOINTMENT (OUTPATIENT)
Dept: GENERAL RADIOLOGY | Age: 28
DRG: 711 | End: 2018-03-02
Payer: COMMERCIAL

## 2018-03-02 ENCOUNTER — APPOINTMENT (OUTPATIENT)
Dept: INTERVENTIONAL RADIOLOGY/VASCULAR | Age: 28
DRG: 711 | End: 2018-03-02
Payer: COMMERCIAL

## 2018-03-02 VITALS — OXYGEN SATURATION: 89 % | TEMPERATURE: 98.6 F | SYSTOLIC BLOOD PRESSURE: 94 MMHG | DIASTOLIC BLOOD PRESSURE: 47 MMHG

## 2018-03-02 LAB
ANION GAP SERPL CALCULATED.3IONS-SCNC: 19 MEQ/L (ref 8–16)
BLOOD CULTURE, ROUTINE: NORMAL
BUN BLDV-MCNC: 6 MG/DL (ref 7–22)
CALCIUM IONIZED: 1.1 MMOL/L (ref 1.12–1.32)
CALCIUM SERPL-MCNC: 9.1 MG/DL (ref 8.5–10.5)
CHLORIDE BLD-SCNC: 100 MEQ/L (ref 98–111)
CO2: 23 MEQ/L (ref 23–33)
CREAT SERPL-MCNC: 0.6 MG/DL (ref 0.4–1.2)
GFR SERPL CREATININE-BSD FRML MDRD: > 90 ML/MIN/1.73M2
GLUCOSE BLD-MCNC: 84 MG/DL (ref 70–108)
GLUCOSE BLD-MCNC: 99 MG/DL (ref 70–108)
MAGNESIUM: 1.9 MG/DL (ref 1.6–2.4)
PHOSPHORUS: 2.7 MG/DL (ref 2.4–4.7)
POTASSIUM SERPL-SCNC: 4.1 MEQ/L (ref 3.5–5.2)
SODIUM BLD-SCNC: 142 MEQ/L (ref 135–145)

## 2018-03-02 PROCEDURE — 6360000002 HC RX W HCPCS: Performed by: SURGERY

## 2018-03-02 PROCEDURE — 7100000000 HC PACU RECOVERY - FIRST 15 MIN: Performed by: SURGERY

## 2018-03-02 PROCEDURE — C1769 GUIDE WIRE: HCPCS | Performed by: SURGERY

## 2018-03-02 PROCEDURE — S2900 ROBOTIC SURGICAL SYSTEM: HCPCS | Performed by: SURGERY

## 2018-03-02 PROCEDURE — 7100000001 HC PACU RECOVERY - ADDTL 15 MIN: Performed by: SURGERY

## 2018-03-02 PROCEDURE — 3600000009 HC SURGERY ROBOT BASE: Performed by: SURGERY

## 2018-03-02 PROCEDURE — 2500000003 HC RX 250 WO HCPCS: Performed by: NURSE ANESTHETIST, CERTIFIED REGISTERED

## 2018-03-02 PROCEDURE — 84100 ASSAY OF PHOSPHORUS: CPT

## 2018-03-02 PROCEDURE — 6360000002 HC RX W HCPCS: Performed by: INTERNAL MEDICINE

## 2018-03-02 PROCEDURE — 2580000003 HC RX 258: Performed by: INTERNAL MEDICINE

## 2018-03-02 PROCEDURE — 1200000000 HC SEMI PRIVATE

## 2018-03-02 PROCEDURE — C1769 GUIDE WIRE: HCPCS

## 2018-03-02 PROCEDURE — C9113 INJ PANTOPRAZOLE SODIUM, VIA: HCPCS | Performed by: SURGERY

## 2018-03-02 PROCEDURE — 80048 BASIC METABOLIC PNL TOTAL CA: CPT

## 2018-03-02 PROCEDURE — 0W9G40Z DRAINAGE OF PERITONEAL CAVITY WITH DRAINAGE DEVICE, PERCUTANEOUS ENDOSCOPIC APPROACH: ICD-10-PCS | Performed by: SURGERY

## 2018-03-02 PROCEDURE — 6360000002 HC RX W HCPCS: Performed by: ANESTHESIOLOGY

## 2018-03-02 PROCEDURE — 2580000003 HC RX 258: Performed by: SURGERY

## 2018-03-02 PROCEDURE — C1894 INTRO/SHEATH, NON-LASER: HCPCS

## 2018-03-02 PROCEDURE — C1751 CATH, INF, PER/CENT/MIDLINE: HCPCS

## 2018-03-02 PROCEDURE — 77001 FLUOROGUIDE FOR VEIN DEVICE: CPT | Performed by: RADIOLOGY

## 2018-03-02 PROCEDURE — 3700000000 HC ANESTHESIA ATTENDED CARE: Performed by: SURGERY

## 2018-03-02 PROCEDURE — 36569 INSJ PICC 5 YR+ W/O IMAGING: CPT

## 2018-03-02 PROCEDURE — 82330 ASSAY OF CALCIUM: CPT

## 2018-03-02 PROCEDURE — 82948 REAGENT STRIP/BLOOD GLUCOSE: CPT

## 2018-03-02 PROCEDURE — 3700000001 HC ADD 15 MINUTES (ANESTHESIA): Performed by: SURGERY

## 2018-03-02 PROCEDURE — 2500000003 HC RX 250 WO HCPCS

## 2018-03-02 PROCEDURE — A4212 NON CORING NEEDLE OR STYLET: HCPCS

## 2018-03-02 PROCEDURE — 76937 US GUIDE VASCULAR ACCESS: CPT

## 2018-03-02 PROCEDURE — 02HV33Z INSERTION OF INFUSION DEVICE INTO SUPERIOR VENA CAVA, PERCUTANEOUS APPROACH: ICD-10-PCS | Performed by: RADIOLOGY

## 2018-03-02 PROCEDURE — 2780000010 HC IMPLANT OTHER: Performed by: SURGERY

## 2018-03-02 PROCEDURE — 6370000000 HC RX 637 (ALT 250 FOR IP): Performed by: SURGERY

## 2018-03-02 PROCEDURE — 83735 ASSAY OF MAGNESIUM: CPT

## 2018-03-02 PROCEDURE — 71045 X-RAY EXAM CHEST 1 VIEW: CPT

## 2018-03-02 PROCEDURE — 49320 DIAG LAPARO SEPARATE PROC: CPT | Performed by: SURGERY

## 2018-03-02 PROCEDURE — 99024 POSTOP FOLLOW-UP VISIT: CPT | Performed by: SURGERY

## 2018-03-02 PROCEDURE — 0D758DZ DILATION OF ESOPHAGUS WITH INTRALUMINAL DEVICE, VIA NATURAL OR ARTIFICIAL OPENING ENDOSCOPIC: ICD-10-PCS | Performed by: INTERNAL MEDICINE

## 2018-03-02 PROCEDURE — 3209999900 FLUORO FOR SURGICAL PROCEDURES

## 2018-03-02 PROCEDURE — C1874 STENT, COATED/COV W/DEL SYS: HCPCS | Performed by: SURGERY

## 2018-03-02 PROCEDURE — 0WJG4ZZ INSPECTION OF PERITONEAL CAVITY, PERCUTANEOUS ENDOSCOPIC APPROACH: ICD-10-PCS | Performed by: SURGERY

## 2018-03-02 PROCEDURE — 6360000002 HC RX W HCPCS: Performed by: NURSE ANESTHETIST, CERTIFIED REGISTERED

## 2018-03-02 PROCEDURE — 2580000003 HC RX 258: Performed by: NURSE ANESTHETIST, CERTIFIED REGISTERED

## 2018-03-02 PROCEDURE — 2500000003 HC RX 250 WO HCPCS: Performed by: ANESTHESIOLOGY

## 2018-03-02 PROCEDURE — 3600000019 HC SURGERY ROBOT ADDTL 15MIN: Performed by: SURGERY

## 2018-03-02 PROCEDURE — 2500000003 HC RX 250 WO HCPCS: Performed by: SURGERY

## 2018-03-02 PROCEDURE — 6360000002 HC RX W HCPCS

## 2018-03-02 PROCEDURE — 36415 COLL VENOUS BLD VENIPUNCTURE: CPT

## 2018-03-02 PROCEDURE — 36556 INSERT NON-TUNNEL CV CATH: CPT | Performed by: RADIOLOGY

## 2018-03-02 PROCEDURE — 76937 US GUIDE VASCULAR ACCESS: CPT | Performed by: RADIOLOGY

## 2018-03-02 DEVICE — STENT ES L12CM DIA18MM CATH 18.5FR L120CM MTL FULL CVR SELF: Type: IMPLANTABLE DEVICE | Status: FUNCTIONAL

## 2018-03-02 RX ORDER — MIDAZOLAM HYDROCHLORIDE 1 MG/ML
INJECTION INTRAMUSCULAR; INTRAVENOUS PRN
Status: DISCONTINUED | OUTPATIENT
Start: 2018-03-02 | End: 2018-03-02 | Stop reason: SDUPTHER

## 2018-03-02 RX ORDER — DEXAMETHASONE SODIUM PHOSPHATE 4 MG/ML
INJECTION, SOLUTION INTRA-ARTICULAR; INTRALESIONAL; INTRAMUSCULAR; INTRAVENOUS; SOFT TISSUE PRN
Status: DISCONTINUED | OUTPATIENT
Start: 2018-03-02 | End: 2018-03-02 | Stop reason: SDUPTHER

## 2018-03-02 RX ORDER — LACTULOSE 10 G/15ML
20 SOLUTION ORAL DAILY PRN
Qty: 900 ML | Refills: 1 | Status: SHIPPED | OUTPATIENT
Start: 2018-03-02

## 2018-03-02 RX ORDER — GLYCOPYRROLATE 1 MG/5 ML
SYRINGE (ML) INTRAVENOUS PRN
Status: DISCONTINUED | OUTPATIENT
Start: 2018-03-02 | End: 2018-03-02 | Stop reason: SDUPTHER

## 2018-03-02 RX ORDER — DIPHENHYDRAMINE HYDROCHLORIDE 50 MG/ML
12.5 INJECTION INTRAMUSCULAR; INTRAVENOUS
Status: DISCONTINUED | OUTPATIENT
Start: 2018-03-02 | End: 2018-03-02 | Stop reason: HOSPADM

## 2018-03-02 RX ORDER — MEPERIDINE HYDROCHLORIDE 25 MG/ML
25 INJECTION INTRAMUSCULAR; INTRAVENOUS; SUBCUTANEOUS
Status: DISCONTINUED | OUTPATIENT
Start: 2018-03-02 | End: 2018-03-02 | Stop reason: HOSPADM

## 2018-03-02 RX ORDER — 0.9 % SODIUM CHLORIDE 0.9 %
10 VIAL (ML) INJECTION EVERY 12 HOURS SCHEDULED
Status: DISCONTINUED | OUTPATIENT
Start: 2018-03-02 | End: 2018-03-06 | Stop reason: HOSPADM

## 2018-03-02 RX ORDER — LABETALOL HYDROCHLORIDE 5 MG/ML
5 INJECTION, SOLUTION INTRAVENOUS EVERY 10 MIN PRN
Status: DISCONTINUED | OUTPATIENT
Start: 2018-03-02 | End: 2018-03-02 | Stop reason: HOSPADM

## 2018-03-02 RX ORDER — ONDANSETRON 2 MG/ML
INJECTION INTRAMUSCULAR; INTRAVENOUS PRN
Status: DISCONTINUED | OUTPATIENT
Start: 2018-03-02 | End: 2018-03-02 | Stop reason: SDUPTHER

## 2018-03-02 RX ORDER — FENTANYL CITRATE 50 UG/ML
INJECTION, SOLUTION INTRAMUSCULAR; INTRAVENOUS PRN
Status: DISCONTINUED | OUTPATIENT
Start: 2018-03-02 | End: 2018-03-02 | Stop reason: SDUPTHER

## 2018-03-02 RX ORDER — SODIUM CHLORIDE 0.9 % (FLUSH) 0.9 %
10 SYRINGE (ML) INJECTION EVERY 12 HOURS SCHEDULED
Status: DISCONTINUED | OUTPATIENT
Start: 2018-03-02 | End: 2018-03-06 | Stop reason: HOSPADM

## 2018-03-02 RX ORDER — HYDROMORPHONE HCL 110MG/55ML
PATIENT CONTROLLED ANALGESIA SYRINGE INTRAVENOUS PRN
Status: DISCONTINUED | OUTPATIENT
Start: 2018-03-02 | End: 2018-03-02 | Stop reason: SDUPTHER

## 2018-03-02 RX ORDER — OMEPRAZOLE 40 MG/1
40 CAPSULE, DELAYED RELEASE ORAL
Qty: 60 CAPSULE | Refills: 1 | Status: ON HOLD | OUTPATIENT
Start: 2018-03-02 | End: 2018-04-09 | Stop reason: ALTCHOICE

## 2018-03-02 RX ORDER — FENTANYL CITRATE 50 UG/ML
25 INJECTION, SOLUTION INTRAMUSCULAR; INTRAVENOUS EVERY 5 MIN PRN
Status: DISCONTINUED | OUTPATIENT
Start: 2018-03-02 | End: 2018-03-02 | Stop reason: HOSPADM

## 2018-03-02 RX ORDER — NEOSTIGMINE METHYLSULFATE 1 MG/ML
INJECTION, SOLUTION INTRAVENOUS PRN
Status: DISCONTINUED | OUTPATIENT
Start: 2018-03-02 | End: 2018-03-02 | Stop reason: SDUPTHER

## 2018-03-02 RX ORDER — PROMETHAZINE HYDROCHLORIDE 25 MG/ML
12.5 INJECTION, SOLUTION INTRAMUSCULAR; INTRAVENOUS
Status: DISCONTINUED | OUTPATIENT
Start: 2018-03-02 | End: 2018-03-02 | Stop reason: HOSPADM

## 2018-03-02 RX ORDER — KETAMINE HYDROCHLORIDE 50 MG/ML
INJECTION, SOLUTION, CONCENTRATE INTRAMUSCULAR; INTRAVENOUS PRN
Status: DISCONTINUED | OUTPATIENT
Start: 2018-03-02 | End: 2018-03-02 | Stop reason: SDUPTHER

## 2018-03-02 RX ORDER — FENTANYL CITRATE 50 UG/ML
50 INJECTION, SOLUTION INTRAMUSCULAR; INTRAVENOUS EVERY 5 MIN PRN
Status: DISCONTINUED | OUTPATIENT
Start: 2018-03-02 | End: 2018-03-02 | Stop reason: HOSPADM

## 2018-03-02 RX ORDER — ROCURONIUM BROMIDE 10 MG/ML
INJECTION, SOLUTION INTRAVENOUS PRN
Status: DISCONTINUED | OUTPATIENT
Start: 2018-03-02 | End: 2018-03-02 | Stop reason: SDUPTHER

## 2018-03-02 RX ORDER — SODIUM CHLORIDE 0.9 % (FLUSH) 0.9 %
10 SYRINGE (ML) INJECTION PRN
Status: DISCONTINUED | OUTPATIENT
Start: 2018-03-02 | End: 2018-03-06 | Stop reason: HOSPADM

## 2018-03-02 RX ORDER — LABETALOL HYDROCHLORIDE 5 MG/ML
INJECTION, SOLUTION INTRAVENOUS PRN
Status: DISCONTINUED | OUTPATIENT
Start: 2018-03-02 | End: 2018-03-02 | Stop reason: SDUPTHER

## 2018-03-02 RX ORDER — BUPIVACAINE HYDROCHLORIDE AND EPINEPHRINE 5; 5 MG/ML; UG/ML
INJECTION, SOLUTION EPIDURAL; INTRACAUDAL; PERINEURAL PRN
Status: DISCONTINUED | OUTPATIENT
Start: 2018-03-02 | End: 2018-03-02 | Stop reason: HOSPADM

## 2018-03-02 RX ORDER — SUCCINYLCHOLINE CHLORIDE 20 MG/ML
INJECTION INTRAMUSCULAR; INTRAVENOUS PRN
Status: DISCONTINUED | OUTPATIENT
Start: 2018-03-02 | End: 2018-03-02 | Stop reason: SDUPTHER

## 2018-03-02 RX ORDER — SODIUM CHLORIDE 450 MG/100ML
INJECTION, SOLUTION INTRAVENOUS CONTINUOUS
Status: DISCONTINUED | OUTPATIENT
Start: 2018-03-02 | End: 2018-03-03

## 2018-03-02 RX ORDER — SODIUM CHLORIDE 9 MG/ML
INJECTION, SOLUTION INTRAVENOUS CONTINUOUS PRN
Status: DISCONTINUED | OUTPATIENT
Start: 2018-03-02 | End: 2018-03-02 | Stop reason: SDUPTHER

## 2018-03-02 RX ORDER — 0.9 % SODIUM CHLORIDE 0.9 %
10 VIAL (ML) INJECTION PRN
Status: DISCONTINUED | OUTPATIENT
Start: 2018-03-02 | End: 2018-03-06 | Stop reason: HOSPADM

## 2018-03-02 RX ORDER — LACTULOSE 10 G/15ML
20 SOLUTION ORAL DAILY PRN
Status: DISCONTINUED | OUTPATIENT
Start: 2018-03-02 | End: 2018-03-06 | Stop reason: HOSPADM

## 2018-03-02 RX ORDER — LIDOCAINE HYDROCHLORIDE 10 MG/ML
5 INJECTION, SOLUTION EPIDURAL; INFILTRATION; INTRACAUDAL; PERINEURAL ONCE
Status: DISCONTINUED | OUTPATIENT
Start: 2018-03-02 | End: 2018-03-06 | Stop reason: HOSPADM

## 2018-03-02 RX ORDER — PROPOFOL 10 MG/ML
INJECTION, EMULSION INTRAVENOUS PRN
Status: DISCONTINUED | OUTPATIENT
Start: 2018-03-02 | End: 2018-03-02 | Stop reason: SDUPTHER

## 2018-03-02 RX ADMIN — KETAMINE HYDROCHLORIDE 50 MG: 50 INJECTION, SOLUTION INTRAMUSCULAR; INTRAVENOUS at 13:28

## 2018-03-02 RX ADMIN — ONDANSETRON 4 MG: 2 INJECTION INTRAMUSCULAR; INTRAVENOUS at 16:25

## 2018-03-02 RX ADMIN — FENTANYL CITRATE 25 MCG: 50 INJECTION INTRAMUSCULAR; INTRAVENOUS at 15:40

## 2018-03-02 RX ADMIN — FENTANYL CITRATE 50 MCG: 50 INJECTION INTRAMUSCULAR; INTRAVENOUS at 13:27

## 2018-03-02 RX ADMIN — SUCCINYLCHOLINE CHLORIDE 160 MG: 20 INJECTION, SOLUTION INTRAMUSCULAR; INTRAVENOUS at 13:29

## 2018-03-02 RX ADMIN — Medication 0.6 MG: at 14:46

## 2018-03-02 RX ADMIN — HYDRALAZINE HYDROCHLORIDE 10 MG: 20 INJECTION INTRAMUSCULAR; INTRAVENOUS at 21:28

## 2018-03-02 RX ADMIN — PHENYLEPHRINE HYDROCHLORIDE 50 MCG: 10 INJECTION INTRAMUSCULAR; INTRAVENOUS; SUBCUTANEOUS at 14:08

## 2018-03-02 RX ADMIN — PANTOPRAZOLE SODIUM 40 MG: 40 INJECTION, POWDER, FOR SOLUTION INTRAVENOUS at 16:25

## 2018-03-02 RX ADMIN — Medication 10 ML: at 21:13

## 2018-03-02 RX ADMIN — ONDANSETRON 4 MG: 2 INJECTION INTRAMUSCULAR; INTRAVENOUS at 14:46

## 2018-03-02 RX ADMIN — Medication 40 MG: at 13:34

## 2018-03-02 RX ADMIN — LABETALOL HYDROCHLORIDE 5 MG: 5 INJECTION, SOLUTION INTRAVENOUS at 13:51

## 2018-03-02 RX ADMIN — PROPOFOL 40 MG: 10 INJECTION, EMULSION INTRAVENOUS at 14:32

## 2018-03-02 RX ADMIN — DEXAMETHASONE SODIUM PHOSPHATE 4 MG: 4 INJECTION, SOLUTION INTRAMUSCULAR; INTRAVENOUS at 13:35

## 2018-03-02 RX ADMIN — Medication 10 ML: at 16:25

## 2018-03-02 RX ADMIN — SODIUM CHLORIDE: 9 INJECTION, SOLUTION INTRAVENOUS at 13:23

## 2018-03-02 RX ADMIN — PROMETHAZINE HYDROCHLORIDE 25 MG: 25 SUPPOSITORY RECTAL at 21:13

## 2018-03-02 RX ADMIN — FENTANYL CITRATE 25 MCG: 50 INJECTION INTRAMUSCULAR; INTRAVENOUS at 15:35

## 2018-03-02 RX ADMIN — CALCIUM GLUCONATE: 94 INJECTION, SOLUTION INTRAVENOUS at 18:59

## 2018-03-02 RX ADMIN — HYDROMORPHONE HYDROCHLORIDE 0.5 MG: 2 INJECTION INTRAMUSCULAR; INTRAVENOUS; SUBCUTANEOUS at 14:55

## 2018-03-02 RX ADMIN — NEOSTIGMINE METHYLSULFATE 3 MG: 1 INJECTION, SOLUTION INTRAVENOUS at 14:46

## 2018-03-02 RX ADMIN — PIPERACILLIN SODIUM,TAZOBACTAM SODIUM 3.38 G: 3; .375 INJECTION, POWDER, FOR SOLUTION INTRAVENOUS at 17:01

## 2018-03-02 RX ADMIN — FENTANYL CITRATE 50 MCG: 50 INJECTION INTRAMUSCULAR; INTRAVENOUS at 13:46

## 2018-03-02 RX ADMIN — HYOSCYAMINE SULFATE 125 MCG: 0.12 TABLET, ORALLY DISINTEGRATING ORAL at 21:12

## 2018-03-02 RX ADMIN — MIDAZOLAM HYDROCHLORIDE 2 MG: 1 INJECTION, SOLUTION INTRAMUSCULAR; INTRAVENOUS at 13:23

## 2018-03-02 RX ADMIN — HYDROMORPHONE HYDROCHLORIDE 0.5 MG: 2 INJECTION INTRAMUSCULAR; INTRAVENOUS; SUBCUTANEOUS at 14:57

## 2018-03-02 RX ADMIN — HYDROMORPHONE HYDROCHLORIDE 0.5 MG: 1 INJECTION, SOLUTION INTRAMUSCULAR; INTRAVENOUS; SUBCUTANEOUS at 15:20

## 2018-03-02 RX ADMIN — HYDRALAZINE HYDROCHLORIDE 10 MG: 20 INJECTION INTRAMUSCULAR; INTRAVENOUS at 16:26

## 2018-03-02 RX ADMIN — LABETALOL HYDROCHLORIDE 5 MG: 5 INJECTION INTRAVENOUS at 15:30

## 2018-03-02 RX ADMIN — PIPERACILLIN SODIUM,TAZOBACTAM SODIUM 3.38 G: 3; .375 INJECTION, POWDER, FOR SOLUTION INTRAVENOUS at 01:06

## 2018-03-02 RX ADMIN — HYOSCYAMINE SULFATE 125 MCG: 0.12 TABLET, ORALLY DISINTEGRATING ORAL at 17:01

## 2018-03-02 RX ADMIN — Medication 0.2 MG: at 13:23

## 2018-03-02 RX ADMIN — HYDROMORPHONE HYDROCHLORIDE 0.5 MG: 1 INJECTION, SOLUTION INTRAMUSCULAR; INTRAVENOUS; SUBCUTANEOUS at 15:13

## 2018-03-02 RX ADMIN — PROPOFOL 150 MG: 10 INJECTION, EMULSION INTRAVENOUS at 13:28

## 2018-03-02 RX ADMIN — MORPHINE SULFATE 2 MG: 2 INJECTION, SOLUTION INTRAMUSCULAR; INTRAVENOUS at 21:13

## 2018-03-02 ASSESSMENT — PULMONARY FUNCTION TESTS
PIF_VALUE: 22
PIF_VALUE: 0
PIF_VALUE: 25
PIF_VALUE: 31
PIF_VALUE: 17
PIF_VALUE: 0
PIF_VALUE: 25
PIF_VALUE: 25
PIF_VALUE: 20
PIF_VALUE: 23
PIF_VALUE: 20
PIF_VALUE: 20
PIF_VALUE: 21
PIF_VALUE: 23
PIF_VALUE: 20
PIF_VALUE: 21
PIF_VALUE: 24
PIF_VALUE: 21
PIF_VALUE: 25
PIF_VALUE: 20
PIF_VALUE: 20
PIF_VALUE: 1
PIF_VALUE: 24
PIF_VALUE: 20
PIF_VALUE: 23
PIF_VALUE: 20
PIF_VALUE: 20
PIF_VALUE: 22
PIF_VALUE: 20
PIF_VALUE: 13
PIF_VALUE: 21
PIF_VALUE: 24
PIF_VALUE: 20
PIF_VALUE: 22
PIF_VALUE: 23
PIF_VALUE: 23
PIF_VALUE: 20
PIF_VALUE: 24
PIF_VALUE: 1
PIF_VALUE: 22
PIF_VALUE: 21
PIF_VALUE: 24
PIF_VALUE: 19
PIF_VALUE: 20
PIF_VALUE: 21
PIF_VALUE: 17
PIF_VALUE: 23
PIF_VALUE: 21
PIF_VALUE: 32
PIF_VALUE: 25
PIF_VALUE: 21
PIF_VALUE: 25
PIF_VALUE: 30
PIF_VALUE: 21
PIF_VALUE: 25
PIF_VALUE: 23
PIF_VALUE: 23
PIF_VALUE: 21
PIF_VALUE: 18
PIF_VALUE: 21
PIF_VALUE: 22
PIF_VALUE: 20
PIF_VALUE: 20
PIF_VALUE: 17
PIF_VALUE: 1
PIF_VALUE: 33
PIF_VALUE: 20
PIF_VALUE: 0
PIF_VALUE: 19
PIF_VALUE: 24
PIF_VALUE: 23
PIF_VALUE: 9
PIF_VALUE: 1
PIF_VALUE: 22
PIF_VALUE: 24
PIF_VALUE: 19
PIF_VALUE: 20
PIF_VALUE: 20
PIF_VALUE: 22
PIF_VALUE: 21
PIF_VALUE: 22
PIF_VALUE: 23
PIF_VALUE: 3
PIF_VALUE: 24
PIF_VALUE: 20
PIF_VALUE: 24
PIF_VALUE: 12
PIF_VALUE: 1

## 2018-03-02 ASSESSMENT — PAIN SCALES - GENERAL
PAINLEVEL_OUTOF10: 0
PAINLEVEL_OUTOF10: 8
PAINLEVEL_OUTOF10: 10
PAINLEVEL_OUTOF10: 6
PAINLEVEL_OUTOF10: 4
PAINLEVEL_OUTOF10: 10
PAINLEVEL_OUTOF10: 8
PAINLEVEL_OUTOF10: 6

## 2018-03-02 ASSESSMENT — LIFESTYLE VARIABLES: SMOKING_STATUS: 1

## 2018-03-02 ASSESSMENT — PAIN DESCRIPTION - PAIN TYPE: TYPE: SURGICAL PAIN

## 2018-03-02 ASSESSMENT — PAIN DESCRIPTION - LOCATION: LOCATION: ABDOMEN

## 2018-03-02 NOTE — PROGRESS NOTES
pt phenergan injection for nausea; pt refuses, states \"I don't want anymore pokes and I just want to go back to my room\". VS stable at this time, respirations unlabored. 1600-Pt reassessed for readiness for discharge from PACU. Pt alert and oriented, VS stable, respirations unlabored on 4L O2 via NC. IV continues to infuse into PICC without difficulty; site soft, clean, dry, intact. Surgical dressing remains in place, clean, dry, intact. DOYLE drain remains in place, no drainage noted in collection bulb, bulb remains compressed. Ice pack in place on abdomen. SCDs remains in place. Report called to [facility] on [address]; no questions or concerns voiced. Transport notified of need for transfer back to [facility]. Waiting room called, no family present at this time. 1606-Pt leaving with transport team at this time, remains in stable condition. Chart on bed.

## 2018-03-02 NOTE — PROGRESS NOTES
the chest. IV contrast is being actively excreted symmetrically from both normal-appearing kidneys through bilateral normal-appearing ureters and beginning to fill the normal-appearing urinary bladder.       On the CTA of the chest and this slightly delayed CT of the abdomen the liver has a normal shape and size and density. The gallbladder looks normal as does the pancreas.       Both adrenals look normal.       Aorta and IVC looks normal.       There is no free air, free fluid, lymphadenopathy, or mass in the abdomen or pelvis.       Postoperative changes to the stomach are noted consistent with the recent surgery of gastric sleeve.       The stomach otherwise appears normal. The small bowel appears normal. The colon demonstrates moderate diverticular change of the descending colon. The colon otherwise appears normal and contains radiodense oral contrast.       The uterus and right ovary are normal. The left ovary is normal but contains a 2.5 x 3 cm homogeneous water density cyst.       The bones of the pelvis are within normal limits.           Impression   1.  The contrast filled pulmonary arteries are clear of filling defect. No acute pulmonary arterial embolus is detected. 2.  Triangle shaped opacity in the posterior lateral left lung base likely represents atelectasis but could represent changes of early pneumonia. 3.  Small subcapsular hematoma of the medial aspect of the spleen is favored significantly more than an infarct due to the shape of the area of hypodensity. 4.  The nonspecific stranding between the stomach and spleen is probably postoperative change from the recent gastric sleeve procedure. 5.  No free fluid. No abscess.  No free air.         Narrative   PROCEDURE: CT ABDOMEN PELVIS W IV CONTRAST       CLINICAL INFORMATION: Fever, recent sleeve gastrectomy       TECHNIQUE: CT of the abdomen and pelvis was performed following administration of 80 mL Isovue-370 intravenous contrast. Axial images as well as coronal and sagittal reconstructions were obtained.       All CT scans at this facility use dose modulation, iterative reconstruction, and/or weight-based dosing when appropriate to reduce radiation dose to as low as reasonably achievable.       COMPARISON: CT abdomen and pelvis 2/24/2018       FINDINGS:        Lower thorax: There is a small left-sided pleural effusion with minimal adjacent lung opacification.       Abdomen: There is surgical changes in the stomach of prior sleeve gastrectomy. A fluid collection bordering the gastric fundus contains pockets of gas and now measures approximately 3.5 x 2.3 cm (image 18; prior measurement 2.3 x 2.5 cm). Mesenteric    stranding adjacent to the stomach is stable. An area of hypoattenuation in the posterior spleen adjacent to the fluid collection is stable. Bowel is normal in course and caliber without evidence of obstruction. There are scattered diverticula throughout    the colon without evidence of diverticulitis. The liver, gallbladder, pancreas, adrenal glands, kidneys and abdominal aorta are normal. Prominent retroperitoneal lymph nodes in the upper abdomen are likely reactive. There is no mesenteric    lymphadenopathy. Mild degenerative changes are seen in the lumbar spine without evidence of aggressive osseous lesions.       Pelvis: The urinary bladder is unremarkable. There are multiple phleboliths in the pelvis. There is no pelvic or inguinal lymphadenopathy. No aggressive osseous lesions are identified           Impression   1. Slightly enlarging fluid collection with pockets of gas adjacent to the gastric fundus. This finding may represent a small abscess or contained perforation. 2. Stable hypoattenuating structure in the spleen suspicious for injury.    3. Pan colonic diverticulosis.         Electronically signed by Peace Perez MD on 3/2/2018 at 7:56 AM

## 2018-03-02 NOTE — BRIEF OP NOTE
Brief Postoperative Note    Lula Salinas  YOB: 1990  [id number]    Pre-operative Diagnosis: 1. Fevers  2. Intra-abdominal abscess  3. Post sleeve gastrectomy leak    Post-operative Diagnosis: Same    Procedure: 1. Robotic exploration with washout and drain placement    Anesthesia: General and Local    Surgeons/Assistants:  Drake/Sid    Estimated Blood Loss: 20 ml    Complications: None    Specimens: Was Not Obtained    Findings: as above    Electronically signed by Tony Myles MD on 3/2/2018 at 2:26 PM

## 2018-03-02 NOTE — CONSULTS
 Diabetes mellitus ([facility]    Hypertension    GERD (gastroesophageal reflux disease)    Obesity    S/P laparoscopic sleeve gastrectomy    Dehydration    Nausea    Fever     Past Medical History:   Diagnosis Date    Anxiety     Asthma     Chronic back pain     Depression     [facility]     GERD (gastroesophageal reflux disease)     Headache     Hypertension     Infertility, female     Thyroid disease     Ulcer ([facility]     UTI (urinary tract infection)        Past Surgical History:   Procedure Laterality Date    BREAST SURGERY Right 2016    biopsy    COLONOSCOPY  2015? Dr. See Javier LAP,STOMACH,OTHER,W/O TUBE N/A 2018    GASTRECTOMY SLEEVE LAPAROSCOPIC ROBOTIC performed by Darwin Olson MD at [address]  ? Dr. Hal Capone      Dr. Catina Dubois       Prior to Admission medications    Medication Sig Start Date End Date Taking?  Authorizing Provider   ketorolac (TORADOL) 10 MG tablet Take 1 tablet by mouth every 8 hours as needed for Pain 18  Yes Porfirio Richardson CNP   metoclopramide (REGLAN) 10 MG tablet Take 1 tablet by mouth every 6 hours as needed (nausea/vomiting) 18 Yes Darwin Olson MD   Docusate Sodium 100 MG TABS Take 100 mg by mouth 2 times daily Take as needed to prevent constipation 18  Yes Darwin Olson MD   omeprazole (PRILOSEC) 40 MG delayed release capsule Take 1 capsule by mouth daily Open capsule and take in liquid 18 Yes Darwin Olson MD   Prenatal Multivit-Min-Fe-FA (PRE-CHANTEL PO) Take 1 tablet by mouth daily   Yes Historical Provider, MD   levothyroxine (SYNTHROID) 125 MCG tablet Take 125 mcg by mouth every morning   Yes Historical Provider, MD   Melatonin 10 MG TABS Take 1 tablet by mouth nightly   Yes Historical Provider, MD   Flaxseed Linseed, (FLAXSEED OIL PO) Take 1,500 mg by mouth every morning   Yes Historical thoughts. Blood: no anemia, blood product or blood product transfusion   Allergic/Immunologic: No lupus or HIV  Cancer: no personal history of cancer     Physical exam Reveals   Vitals BP (!) 125/92   Pulse 83   Temp 98 °F (36.7 °C) (Oral)   Resp 16   Ht 5' 9\" (1.753 m)   Wt (!) 320 lb (145.2 kg)   LMP 02/14/2018 (Approximate)   SpO2 95%   BMI 47.26 kg/m²   HEENT: head is normocephalic atraumatic. Conjunctiva are pink. Mucous membranes   moist.   Neck: supple w/o thyromegaly or lymphadenopathy. Trachea is midline. No JVD or   carotid bruits ascultated. Heart: regular rate and rhythm w/o murmur rub or gallop   Chest: lungs are clear to ascultation. AP diameter is normal   Back: no scoliosis or kyphosis. No CVA tenderness  Abdomen: soft nontender to plapation. Normal active bowel sounds heard all   quadrants. No organomegaly or masses noted. Extremities: warm. No clubbing cyanosis or peripheral edema  noted. Bruising left forearm   Skin: no rashes or ulcerations   Neurologic: patient is alert and fully oriented with appropriate affect. DATA REVIEW: WBC:  No results for input(s): WBC, PLT, HGB, HCT in the last 72 hours. BMP:     Recent Labs      03/01/18   0540   NA  141   K  3.9   CL  100   CO2  25   BUN  6*   CREATININE  0.6   CALCIUM  8.7   LABGLOM  >90   GLUCOSE  100     Hepatic Function Panel:   No results for input(s): ALKPHOS, ALT, AST, PROT, BILITOT, BILIDIR, IBILI, LABALBU in the last 72 hours. Calcium:     Recent Labs      03/01/18   0540   CALCIUM  8.7     PT/INR:   No results for input(s): PROTIME, INR in the last 72 hours. PTT:   No results for input(s): APTT, PTT in the last 72 hours. Recent Labs      03/01/18   0540   MG  2.0         Impression: S/P robotic sleeve gastrectomy. Enlarging fluid collection with pockets of gas adjacent to the gastric fundus. This finding may represent a small abscess or contained perforation.  Dr BRYANT [facility] requesting EGD with esophageal stent placement     IBS C Colonoscopy 2014 normal. Taking Lactulose at home with good relief   Gastritis taking pantoprazole at home. Pre surgery EGD completed per Dr Radha Chvaez     Hypertension   DM type II   Hypothyroidism   Sleep apnea     Plan: Plan for EGD with esophageal stent placement today at 1300 with Dr Deandra Branch placed   Lactulose daily as needed for constipation.  Prescription written for home also       Assessment and planning discussed with SHANTEL Olmedo CNP

## 2018-03-02 NOTE — OP NOTE
EXTREMITIES: no cyanosis, clubbing, or edema. NEURO: Alert and oriented times four. Patient moves all extremities and has gross sensation in all extremities. ASA: ASA 3 - Patient with moderate systemic disease with functional limitations    MEDICATION:    MAC/Propofol/Anesthesia:  Yes     Photo:  Yes  Biopsy:  No      Description of Procedure: The risks and benefits of the procedure were described to the patient or their representative if unable to give consent including but not limited to bleeding, infection, poking a hole someplace requiring surgery to fix it, having a reaction to medication, and death. The patient  or their representative if unable to give consent understood these risks and provided informed consent. Airway was assessed and is adequate for the planned sedation. Anesthesia: MAC  Estimated Blood Loss: less than 50   Complications: None  Specimens: Was Not Obtained    Sedation was administered by anesthesia who monitored the patient during the procedure. The patient was placed in the left lateral decubitus position. A forward-viewing Olympus endoscope was lubricated and inserted through the mouth into the oropharynx. Under direct visualization, the upper esophagus was intubated. The scope was advanced to the esophagus and stomach to second portion of duodenum. Scope was slowly withdrawn with good views of mucosal surfaces. The scope was retroflexed in the fundus. Findings and maneuvers are listed in impression below. The patient tolerated the procedure well. The scope was removed. There were no immediate complications. IMPRESSION:  1. Esophagus - normal   2. Stomach - s/p gastric sleeve with large, 2 cm, perforation in distal esophagus with visible staples. The defect is traversed and any debris suctioned. There is a large walled off area. I cannot enter the rest of the peritoneum.   3.  Successful placement of 18 mm x 12 cm fully coated esophageal stent over the GEJ in to the stomach using fluoro. 4.  Duodenum - normal      RECOMMENDATIONS:    1. Liquid diet per Dr Juvencio Shaver. Do not advance. 2.  Weekly CXR to confirm placement. 3.  Call if development of trouble swallowing, pain, or nausea. 4.  Repeat EGD with propofol to remove stent in 4 weeks. 5.  Bid ppi while stent in place.     Jai Espinoza MD  3:06 PM 3/2/2018

## 2018-03-02 NOTE — PROGRESS NOTES
PICC line catheter exchanged per order. Unable to fully advance catheter to central position. Patient to IR for advancement/replacement.

## 2018-03-02 NOTE — PLAN OF CARE
Problem: Pain:  Goal: Pain level will decrease  Pain level will decrease   Outcome: Ongoing  Patient refusing having pain or needing pain medication. Problem: Safety:  Goal: Free from accidental physical injury  Free from accidental physical injury   Outcome: Ongoing  Patient free from accidental injury. Problem: Daily Care:  Goal: Daily care needs are met  Daily care needs are met   Outcome: Met This Shift  Patient able to do daily care needs without much help. Problem: Skin Integrity:  Goal: Skin integrity will stabilize  Skin integrity will stabilize   Outcome: Ongoing  6 surgical stab sites on abdomen. Multiple bruising on arms. Problem: Discharge Planning:  Goal: Patients continuum of care needs are met  Patients continuum of care needs are met   Outcome: Ongoing  Patient planning home at discharge. Problem: Falls - Risk of  Goal: Absence of falls  Outcome: Ongoing  Patient up at sarah. No falls this shift. Problem: Nutrition  Goal: Optimal nutrition therapy  Patient receiving TPN    Comments: Care plan reviewed with patient. Patient verbalize understanding of the plan of care and contribute to goal setting.

## 2018-03-02 NOTE — PROGRESS NOTES
Total Kcal: ~1647 -1779 kcals ( 25-27 kcals/kg IBW of 65.9 kg)  · Estimated Daily Protein (g): ~ grams protein ( 1.5-2 grams protein/kg on IBW of 65.9 kg)    Estimated Intake vs Estimated Needs: Intake Improving    Nutrition Risk Level: High    Nutrition Interventions:   Continue NPO, Modify current Parenteral Nutrition  Continued Inpatient Monitoring, Education not appropriate at this time    Nutrition Evaluation:   · Evaluation: Progressing toward goals   · Goals: TPN will provide % of nutrient needs until appropriate to transition to po diet    · Monitoring: NPO Status, PN Intake, PN Tolerance, Skin Integrity, Wound Healing, Weight, Pertinent Labs    See Adult Nutrition Doc Flowsheet for more detail.      Electronically signed by Columba Clai RD, LALY on 3/2/18 at 9:53 AM    Contact Number: [phone]

## 2018-03-02 NOTE — PROGRESS NOTES
Patient arrived from pacu with family at bedside. 0.9% NS infusing at 100 ml/hr. 800 ml left in the bag. 94% oxygen sat on 2L.

## 2018-03-02 NOTE — ANESTHESIA PRE PROCEDURE
Department of Anesthesiology  Preprocedure Note       Name:  Holger Reece   Age:  25 y.o.  :  1990                                          MRN:  [id number]         Date:  3/2/2018      Surgeon: Dary Galvan):  MD Riki Parker MD    Procedure: Procedure(s):  ROBOTIC EXPLORATION WITH WASHOUT AND DRAIN PLACEMENT  EGD WITH STENT PLACEMENT    Medications prior to admission:   Prior to Admission medications    Medication Sig Start Date End Date Taking?  Authorizing Provider   lactulose (CHRONULAC) 10 GM/15ML solution Take 30 mLs by mouth daily as needed (constipation) 3/2/18  Yes Cici Charles CNP   ketorolac (TORADOL) 10 MG tablet Take 1 tablet by mouth every 8 hours as needed for Pain 18  Yes Shirley Pacheco CNP   metoclopramide (REGLAN) 10 MG tablet Take 1 tablet by mouth every 6 hours as needed (nausea/vomiting) 18 Yes Halley Zepeda MD   Docusate Sodium 100 MG TABS Take 100 mg by mouth 2 times daily Take as needed to prevent constipation 18  Yes Halley Zepeda MD   omeprazole (PRILOSEC) 40 MG delayed release capsule Take 1 capsule by mouth daily Open capsule and take in liquid 18 Yes Halley Zepeda MD   Prenatal Multivit-Min-Fe-FA (PRE- PO) Take 1 tablet by mouth daily   Yes Historical Provider, MD   levothyroxine (SYNTHROID) 125 MCG tablet Take 125 mcg by mouth every morning   Yes Historical Provider, MD   Melatonin 10 MG TABS Take 1 tablet by mouth nightly   Yes Historical Provider, MD   Flaxseed, Linseed, (FLAXSEED OIL PO) Take 1,500 mg by mouth every morning   Yes Historical Provider, MD   Lysine 500 MG TABS Take 500 mg by mouth nightly   Yes Historical Provider, MD   Norgestimate-Eth Estradiol (SPRINTEC 28 PO) Take by mouth daily    Yes Historical Provider, MD   Calcium Carbonate-Vitamin D (OS-ADIN 500 + D PO) Take by mouth 2 times daily   Yes Historical Provider, MD   labetalol (NORMODYNE) 100 MG tablet 200 mg 2 times PRN Su Rogers MD        LORazepam (ATIVAN) injection 0.5 mg  0.5 mg Intravenous Q6H PRN Monique Pugh MD   0.5 mg at 03/01/18 2013    PN-Adult  3 IN 1 Central Line (Custom)   Intravenous Continuous TPN Monique Pugh MD 80 mL/hr at 03/01/18 2012      insulin lispro (HUMALOG) injection vial 0-12 Units  0-12 Units Subcutaneous Q6H Julio Conrad, [facility]        glucose (GLUTOSE) 40 % oral gel 15 g  15 g Oral PRN Bakari Malik, [facility]        dextrose 50 % solution 12.5 g  12.5 g Intravenous PRN Bakari Malik, [facility]        glucagon (rDNA) injection 1 mg  1 mg Intramuscular PRN Darene Helena, [facility]        dextrose 5 % solution  100 mL/hr Intravenous PRN Bakari Malik, [facility]        hydrALAZINE (APRESOLINE) injection 10 mg  10 mg Intravenous Q6H Monique Pugh MD   10 mg at 03/01/18 0240    piperacillin-tazobactam (ZOSYN) 3.375 g in dextrose 5 % 50 mL IVPB extended infusion (mini-bag)  3.375 g Intravenous Q8H Aime Cortes MD   Stopped at 03/02/18 0506    [START ON 3/3/2018] levothyroxine (SYNTHROID) injection 62.5 mcg  62.5 mcg Intravenous Daily Monique Pugh MD        And   [facility] [START ON 3/3/2018] sodium chloride (PF) 0.9 % injection 5 mL  5 mL Intravenous Daily Monique Pugh MD        FLUoxetine (PROZAC) 20 MG/5ML solution 20 mg  20 mg Oral Daily Monique Pugh MD   20 mg at 03/01/18 1123    diphenhydrAMINE (BENADRYL) injection 25 mg  25 mg Intravenous Q8H PRN Monique Pugh MD   25 mg at 02/28/18 2345    Or    diphenhydrAMINE (BENADRYL) injection 50 mg  50 mg Intravenous Q8H PRN Monique Pugh MD   50 mg at 03/01/18 2245    pantoprazole (PROTONIX) injection 40 mg  40 mg Intravenous BID Monique Pugh MD   40 mg at 03/01/18 1711    And    sodium chloride (PF) 0.9 % injection 10 mL  10 mL Intravenous BID Monique Pugh MD   10 mL at 03/01/18 1719    morphine injection 2 mg  2 mg Intravenous Q4H PRN Monique Pugh MD   2 mg at 02/25/18 2978    Diagnosis Date    Anxiety     Asthma     Chronic back pain     Depression     Diabetes mellitus (HCC)     GERD (gastroesophageal reflux disease)     Headache     Hypertension     Infertility, female     Thyroid disease     Ulcer ([facility] [address]     UTI (urinary tract infection)        Past Surgical History:        Procedure Laterality Date    BREAST SURGERY Right 2016    biopsy    COLONOSCOPY  2015? Dr. Maddy Carias LAP,STOMACH,OTHER,W/O TUBE N/A 2/12/2018    GASTRECTOMY SLEEVE LAPAROSCOPIC ROBOTIC performed by Tony Myles MD at [address] [facility]  2015? Dr. Raijnder Clinton  2017    Dr. Juwan Lan       Social History:    Social History   Substance Use Topics    Smoking status: Former Smoker     Packs/day: 0.50     Types: Cigarettes     Quit date: 5/18/2014    Smokeless tobacco: Never Used    Alcohol use No                                Counseling given: Not Answered      Vital Signs (Current):   Vitals:    03/02/18 0601 03/02/18 0745 03/02/18 1123 03/02/18 1132   BP: (!) 149/79 (!) 125/92     Pulse: 66 83 86 82   Resp: 16 16 16 16   Temp: 97.9 °F (36.6 °C) 98 °F (36.7 °C)     TempSrc: Oral Oral     SpO2: 95% 95% 97% 97%   Weight:       Height:                                                  BP Readings from Last 3 Encounters:   03/02/18 (!) 125/92   02/22/18 133/85   02/21/18 (!) 134/91       NPO Status:                                                                                 BMI:   Wt Readings from Last 3 Encounters:   02/23/18 (!) 320 lb (145.2 kg)   02/21/18 (!) 322 lb (146.1 kg)   02/19/18 (!) 322 lb 3.2 oz (146.1 kg)     Body mass index is 47.26 kg/m².     CBC:   Lab Results   Component Value Date    WBC 3.2 02/25/2018    RBC 3.95 02/25/2018    HGB 11.7 02/25/2018    HCT 35.5 02/25/2018    MCV 90.0 02/25/2018    RDW 14.0 02/25/2018     02/25/2018       CMP:   Lab Results   Component Value Date

## 2018-03-02 NOTE — H&P
Rises equally on inspiration. Clear to auscultation bilaterally. CARDIOVASCULAR: Regular rate and rhythm without murmurs, rubs or gallops. ABDOMEN: Soft, nontender, and nondistended with normal bowel sounds. No Hepatosplemomegaly. UPPER EXTREMITIES: no cyanosis, clubbing, or edema. DERM: no rash or jaundice. LOWER EXTREMITIES: no cyanosis, clubbing, or edema. NEURO: Alert and oriented times four. Patient moves all extremities and has gross sensation in all extremities. ASA: ASA 3 - Patient with moderate systemic disease with functional limitations    Past Medical History:        Diagnosis Date    Anxiety     Asthma     Chronic back pain     Depression     Diabetes mellitus ([facility] [address]     GERD (gastroesophageal reflux disease)     Headache     Hypertension     Infertility, female     Thyroid disease     Ulcer ([facility] [address]     UTI (urinary tract infection)      Past Surgical History:        Procedure Laterality Date    BREAST SURGERY Right 2016    biopsy    COLONOSCOPY  2015? Dr. Gabrielle Cotto LAP,STOMACH,OTHER,W/O TUBE N/A 2/12/2018    GASTRECTOMY SLEEVE LAPAROSCOPIC ROBOTIC performed by Teri Rollins MD at [facility]  2015? Dr. Selene Pradhan  2017    Dr. Barbara Pratt     Allergies:  Bee venom; Bactrim [sulfamethoxazole-trimethoprim]; Mirapex [pramipexole];  Oxycodone; and Requip [ropinirole]  Home Meds:  Current Facility-Administered Medications   Medication Dose Route Frequency Provider Last Rate Last Dose    lidocaine PF 1 % injection 5 mL  5 mL Intradermal Once Teri Rollins MD        sodium chloride flush 0.9 % injection 10 mL  10 mL Intravenous 2 times per day Teri Rollins MD        sodium chloride flush 0.9 % injection 10 mL  10 mL Intravenous PRN Teri Rollins MD        lactulose (CHRONULAC) 10 GM/15ML solution 20 g  20 g Oral Daily PRN Milind Chin CNP        LORazepam (ATIVAN) injection 0.5 mg  0.5 mg Intravenous Q6H PRN Priscilla Escamilla MD   0.5 mg at 03/01/18 2013    PN-Adult  3 IN 1 Central Line (Custom)   Intravenous Continuous TPN Priscilla Escamilla MD 80 mL/hr at 03/01/18 2012      insulin lispro (HUMALOG) injection vial 0-12 Units  0-12 Units Subcutaneous Q6H Julio Conrad, RP        glucose (GLUTOSE) 40 % oral gel 15 g  15 g Oral PRN Lorna Israel, RPH        dextrose 50 % solution 12.5 g  12.5 g Intravenous PRN Lorna Israel, RPH        glucagon (rDNA) injection 1 mg  1 mg Intramuscular PRN Lorna Israel, RPH        dextrose 5 % solution  100 mL/hr Intravenous PRN Lorna Garciae, RPH        hydrALAZINE (APRESOLINE) injection 10 mg  10 mg Intravenous Q6H Priscilla Escamilla MD   10 mg at 03/01/18 0240    piperacillin-tazobactam (ZOSYN) 3.375 g in dextrose 5 % 50 mL IVPB extended infusion (mini-bag)  3.375 g Intravenous Q8H Kristin Rogel MD   Stopped at 03/02/18 0506    [START ON 3/3/2018] levothyroxine (SYNTHROID) injection 62.5 mcg  62.5 mcg Intravenous Daily Priscilla Escamilla MD        And   [employer] [START ON 3/3/2018] sodium chloride (PF) 0.9 % injection 5 mL  5 mL Intravenous Daily Priscilla Escamilla MD        FLUoxetine (PROZAC) 20 MG/5ML solution 20 mg  20 mg Oral Daily Priscilla Escamilla MD   20 mg at 03/01/18 1123    diphenhydrAMINE (BENADRYL) injection 25 mg  25 mg Intravenous Q8H PRN Priscilla Escamilla MD   25 mg at 02/28/18 2345    Or    diphenhydrAMINE (BENADRYL) injection 50 mg  50 mg Intravenous Q8H PRN Priscilla Escamilla MD   50 mg at 03/01/18 2245    pantoprazole (PROTONIX) injection 40 mg  40 mg Intravenous BID Priscilla Escamilla MD   40 mg at 03/01/18 1711    And    sodium chloride (PF) 0.9 % injection 10 mL  10 mL Intravenous BID Priscilla Escamilla MD   10 mL at 03/01/18 1712    morphine injection 2 mg  2 mg Intravenous Q4H PRN Priscilla Escamilla MD   2 mg at 02/25/18 9471    morphine (PF) injection 4 mg  4 mg Intravenous Q4H PRN Othella Feathers palpitations. RESPIRATORY: No dyspnea or cough. GI: NO melena. NO hematochezia   : No dysuria or hematuria. GYN: No discharge or abnormal bleeding. MUSCULOSKELETAL: No new arthralgias or myalgias  DERM: No rash or jaundice. ENDOCRINE: No polyuria or polydipsia. PSYCH: No anxiety or depression. Informed Consent:  The risks and benefits of the procedure have been discussed with the patient including infection, perforation, bleeding, stent slippage, ET fistula, ongoing postprocedure pain, and death. Assessment:  Patient examined and appropriate for planned sedation and procedure. Plan:  Proceed with planned sedation and procedure.     Chelsea Santos MD  11:06 AM 3/2/2018

## 2018-03-02 NOTE — PROGRESS NOTES
1115 pt to specials per bed. Procedure explained and consent signed per dr peterson. Pt c/o of right shoulder pain from where the picc is in place at present. Pt states I just want it out . Crying that it just hurts since adjusted. 1117 picc line removed to right upper arm area . Pt states the pain to her right shoulder / neck area instantly gone after removed. Pressure held. 2 x 2 applied. 1122 site rite at bedside. Right neck area prepped. 1133 procedure started per dr peterson to right neck area   1142 procedure completed per dr peterson to right neck area. Catheter flushes well. posiflow caps applied. Good blood return. biopatch and opsite applied. 1150 assist to bed then to floor.

## 2018-03-02 NOTE — PROGRESS NOTES
Colt Conquest, RBCUA, WBCUA, BACTERIA, NITRU, GLUCOSEU, BILIRUBINUR, UROBILINOGEN, KETUA, LABCAST, LABCASTTY, AMORPHOS in the last 72 hours. Invalid input(s): CRYSTALS  Micro:   Lab Results   Component Value Date    BC No growth-preliminary  No growth   02/24/2018         IMAGING:         Problem list of patient:     Patient Active Problem List   Diagnosis Code    Morbid obesity with BMI of 45.0-49.9, adult ([facility] [address] E66.01, Z68.42    Morbid obesity due to excess calories ([facility] [address] E66.01    Diabetes mellitus ([facility] [address] E11.9    Hypertension I10    GERD (gastroesophageal reflux disease) K21.9    Obesity E66.9    S/P laparoscopic sleeve gastrectomy Z98.84    Dehydration E86.0    Nausea R11.0    Fever R50.9    Postprocedural intraabdominal abscess T81. 4XXA         ASSESSMENT/PLAN   Post Robotic assisted sleeve gastrectomy  Abscess formation due to leak, had surgery today, had gastric stent placed.   Continue iv antibiotic  Will follow patient  Baljit Farfan MD, FACP 3/2/2018 5:17 PM

## 2018-03-03 LAB
ANION GAP SERPL CALCULATED.3IONS-SCNC: 13 MEQ/L (ref 8–16)
BUN BLDV-MCNC: 9 MG/DL (ref 7–22)
CALCIUM IONIZED: 1.09 MMOL/L (ref 1.12–1.32)
CALCIUM SERPL-MCNC: 9 MG/DL (ref 8.5–10.5)
CHLORIDE BLD-SCNC: 99 MEQ/L (ref 98–111)
CO2: 26 MEQ/L (ref 23–33)
CREAT SERPL-MCNC: 0.6 MG/DL (ref 0.4–1.2)
GFR SERPL CREATININE-BSD FRML MDRD: > 90 ML/MIN/1.73M2
GLUCOSE BLD-MCNC: 111 MG/DL (ref 70–108)
GLUCOSE BLD-MCNC: 112 MG/DL (ref 70–108)
GLUCOSE BLD-MCNC: 123 MG/DL (ref 70–108)
GLUCOSE BLD-MCNC: 126 MG/DL (ref 70–108)
HCT VFR BLD CALC: 36.7 % (ref 37–47)
HEMOGLOBIN: 12.1 GM/DL (ref 12–16)
MAGNESIUM: 1.9 MG/DL (ref 1.6–2.4)
PHOSPHORUS: 2.3 MG/DL (ref 2.4–4.7)
POTASSIUM SERPL-SCNC: 3.7 MEQ/L (ref 3.5–5.2)
SODIUM BLD-SCNC: 138 MEQ/L (ref 135–145)

## 2018-03-03 PROCEDURE — C9113 INJ PANTOPRAZOLE SODIUM, VIA: HCPCS | Performed by: SURGERY

## 2018-03-03 PROCEDURE — 80048 BASIC METABOLIC PNL TOTAL CA: CPT

## 2018-03-03 PROCEDURE — 82948 REAGENT STRIP/BLOOD GLUCOSE: CPT

## 2018-03-03 PROCEDURE — 36415 COLL VENOUS BLD VENIPUNCTURE: CPT

## 2018-03-03 PROCEDURE — 2580000003 HC RX 258: Performed by: SURGERY

## 2018-03-03 PROCEDURE — 6370000000 HC RX 637 (ALT 250 FOR IP): Performed by: SURGERY

## 2018-03-03 PROCEDURE — 84100 ASSAY OF PHOSPHORUS: CPT

## 2018-03-03 PROCEDURE — 85018 HEMOGLOBIN: CPT

## 2018-03-03 PROCEDURE — 2700000000 HC OXYGEN THERAPY PER DAY

## 2018-03-03 PROCEDURE — 2580000003 HC RX 258: Performed by: INTERNAL MEDICINE

## 2018-03-03 PROCEDURE — 6360000002 HC RX W HCPCS: Performed by: SURGERY

## 2018-03-03 PROCEDURE — 85014 HEMATOCRIT: CPT

## 2018-03-03 PROCEDURE — 2500000003 HC RX 250 WO HCPCS: Performed by: SURGERY

## 2018-03-03 PROCEDURE — 1200000000 HC SEMI PRIVATE

## 2018-03-03 PROCEDURE — 6360000002 HC RX W HCPCS: Performed by: INTERNAL MEDICINE

## 2018-03-03 PROCEDURE — 99024 POSTOP FOLLOW-UP VISIT: CPT | Performed by: SURGERY

## 2018-03-03 PROCEDURE — 2500000003 HC RX 250 WO HCPCS: Performed by: PHARMACIST

## 2018-03-03 PROCEDURE — 82330 ASSAY OF CALCIUM: CPT

## 2018-03-03 PROCEDURE — 83735 ASSAY OF MAGNESIUM: CPT

## 2018-03-03 RX ORDER — LEVOTHYROXINE SODIUM 0.12 MG/1
125 TABLET ORAL DAILY
Status: DISCONTINUED | OUTPATIENT
Start: 2018-03-03 | End: 2018-03-06 | Stop reason: HOSPADM

## 2018-03-03 RX ORDER — HYOSCYAMINE SULFATE 0.125 MG
125 TABLET,DISINTEGRATING ORAL EVERY 4 HOURS PRN
Status: DISCONTINUED | OUTPATIENT
Start: 2018-03-03 | End: 2018-03-06 | Stop reason: HOSPADM

## 2018-03-03 RX ORDER — CALCIUM CARBONATE 200(500)MG
500 TABLET,CHEWABLE ORAL EVERY 6 HOURS PRN
Status: DISCONTINUED | OUTPATIENT
Start: 2018-03-03 | End: 2018-03-06 | Stop reason: HOSPADM

## 2018-03-03 RX ORDER — MAGNESIUM HYDROXIDE/ALUMINUM HYDROXICE/SIMETHICONE 120; 1200; 1200 MG/30ML; MG/30ML; MG/30ML
30 SUSPENSION ORAL EVERY 6 HOURS PRN
Status: DISCONTINUED | OUTPATIENT
Start: 2018-03-03 | End: 2018-03-06 | Stop reason: HOSPADM

## 2018-03-03 RX ADMIN — LORAZEPAM 0.5 MG: 2 INJECTION INTRAMUSCULAR; INTRAVENOUS at 19:04

## 2018-03-03 RX ADMIN — PIPERACILLIN SODIUM,TAZOBACTAM SODIUM 3.38 G: 3; .375 INJECTION, POWDER, FOR SOLUTION INTRAVENOUS at 15:54

## 2018-03-03 RX ADMIN — HYDRALAZINE HYDROCHLORIDE 10 MG: 20 INJECTION INTRAMUSCULAR; INTRAVENOUS at 03:49

## 2018-03-03 RX ADMIN — DIPHENHYDRAMINE HYDROCHLORIDE 50 MG: 50 INJECTION, SOLUTION INTRAMUSCULAR; INTRAVENOUS at 07:55

## 2018-03-03 RX ADMIN — Medication 10 ML: at 15:52

## 2018-03-03 RX ADMIN — HYDROCODONE BITARTRATE AND ACETAMINOPHEN 10 ML: 2.5; 108 SOLUTION ORAL at 07:51

## 2018-03-03 RX ADMIN — PIPERACILLIN SODIUM,TAZOBACTAM SODIUM 3.38 G: 3; .375 INJECTION, POWDER, FOR SOLUTION INTRAVENOUS at 00:15

## 2018-03-03 RX ADMIN — METOCLOPRAMIDE 10 MG: 5 INJECTION, SOLUTION INTRAMUSCULAR; INTRAVENOUS at 12:47

## 2018-03-03 RX ADMIN — ONDANSETRON 4 MG: 2 INJECTION INTRAMUSCULAR; INTRAVENOUS at 17:54

## 2018-03-03 RX ADMIN — Medication 10 ML: at 05:12

## 2018-03-03 RX ADMIN — DIPHENHYDRAMINE HYDROCHLORIDE 50 MG: 50 INJECTION, SOLUTION INTRAMUSCULAR; INTRAVENOUS at 22:58

## 2018-03-03 RX ADMIN — PIPERACILLIN SODIUM,TAZOBACTAM SODIUM 3.38 G: 3; .375 INJECTION, POWDER, FOR SOLUTION INTRAVENOUS at 07:52

## 2018-03-03 RX ADMIN — METOCLOPRAMIDE 10 MG: 5 INJECTION, SOLUTION INTRAMUSCULAR; INTRAVENOUS at 00:15

## 2018-03-03 RX ADMIN — CALCIUM GLUCONATE: 94 INJECTION, SOLUTION INTRAVENOUS at 18:50

## 2018-03-03 RX ADMIN — HYOSCYAMINE SULFATE 125 MCG: 0.12 TABLET, ORALLY DISINTEGRATING ORAL at 07:51

## 2018-03-03 RX ADMIN — LORAZEPAM 0.5 MG: 2 INJECTION INTRAMUSCULAR; INTRAVENOUS at 09:37

## 2018-03-03 RX ADMIN — ONDANSETRON 4 MG: 2 INJECTION INTRAMUSCULAR; INTRAVENOUS at 07:51

## 2018-03-03 RX ADMIN — PANTOPRAZOLE SODIUM 40 MG: 40 INJECTION, POWDER, FOR SOLUTION INTRAVENOUS at 15:54

## 2018-03-03 RX ADMIN — MORPHINE SULFATE 2 MG: 2 INJECTION, SOLUTION INTRAMUSCULAR; INTRAVENOUS at 05:08

## 2018-03-03 RX ADMIN — PANTOPRAZOLE SODIUM 40 MG: 40 INJECTION, POWDER, FOR SOLUTION INTRAVENOUS at 05:10

## 2018-03-03 RX ADMIN — DIPHENHYDRAMINE HYDROCHLORIDE 50 MG: 50 INJECTION, SOLUTION INTRAMUSCULAR; INTRAVENOUS at 14:24

## 2018-03-03 RX ADMIN — ENOXAPARIN SODIUM 40 MG: 40 INJECTION SUBCUTANEOUS at 07:55

## 2018-03-03 RX ADMIN — HYDROCODONE BITARTRATE AND ACETAMINOPHEN 10 ML: 2.5; 108 SOLUTION ORAL at 20:06

## 2018-03-03 RX ADMIN — HYDRALAZINE HYDROCHLORIDE 10 MG: 20 INJECTION INTRAMUSCULAR; INTRAVENOUS at 20:04

## 2018-03-03 RX ADMIN — LORAZEPAM 0.5 MG: 2 INJECTION INTRAMUSCULAR; INTRAVENOUS at 00:40

## 2018-03-03 RX ADMIN — HYDRALAZINE HYDROCHLORIDE 10 MG: 20 INJECTION INTRAMUSCULAR; INTRAVENOUS at 15:52

## 2018-03-03 ASSESSMENT — PAIN DESCRIPTION - PAIN TYPE
TYPE: SURGICAL PAIN

## 2018-03-03 ASSESSMENT — PAIN SCALES - GENERAL
PAINLEVEL_OUTOF10: 7
PAINLEVEL_OUTOF10: 3
PAINLEVEL_OUTOF10: 0
PAINLEVEL_OUTOF10: 7
PAINLEVEL_OUTOF10: 5
PAINLEVEL_OUTOF10: 7
PAINLEVEL_OUTOF10: 3
PAINLEVEL_OUTOF10: 6
PAINLEVEL_OUTOF10: 6

## 2018-03-03 ASSESSMENT — PAIN DESCRIPTION - LOCATION
LOCATION: ABDOMEN

## 2018-03-03 NOTE — PROGRESS NOTES
on axial image 13 and coronal image 41.        The liver is normal. The spleen is of normal size. There is some fluid and fat stranding along the anterior medial border of the spleen where it abuts the gastric surgical site. There is a possible linear defect in the anterior superior corner of the    spleen. This can represent a small splenic injury.        The gallbladder is normal.    There is no hydronephrosis or stones of either kidney. No renal masses are noted.           No abnormalities of the small bowel loops are noted.  The IVC and aorta are of normal caliber. There is no adenopathy.       The urinary bladder is normal. There is no pelvic free fluid.  There is some dense oral contrast in the colon. There is diverticulosis. There is no evidence of diverticulitis.  There are phleboliths in the pelvis. The uterus and adnexa are within    appropriate limits. No suspicious osseous lesions are identified.                   Impression       1. Changes at the gastric fundus. There is a small area of fluid with adjacent air which appears separate from the gastric lumen. This may represent a perforation versus an abscess. This is new.       2. The anterior medial corner of the spleen may have a small splenic injury. There is some adjacent free fluid and linear hypoenhancement. 3. Diverticulosis         Narrative   PROCEDURE: FL UGI       CLINICAL INFORMATION: Nausea, vomiting, recent gastric surgery.       TECHNIQUE: Preliminary images of the abdomen were obtained. Following the oral ingestion of dilute Gastrografin and thin barium, the anatomy of the distal esophagus, stomach and duodenum were evaluated in a limited upper GI examination. A total of 19    spot images were obtained. Total fluoroscopy time 1.0 minutes.       COMPARISON: Upper GI examination 2/13/2018       FINDINGS:        Preliminary image demonstrates a nonobstructive bowel gas pattern.  Surgical suture material is present in the left upper quadrant adjacent to the stomach is stable. An area of hypoattenuation in the posterior spleen adjacent to the fluid collection is stable. Bowel is normal in course and caliber without evidence of obstruction. There are scattered diverticula throughout    the colon without evidence of diverticulitis. The liver, gallbladder, pancreas, adrenal glands, kidneys and abdominal aorta are normal. Prominent retroperitoneal lymph nodes in the upper abdomen are likely reactive. There is no mesenteric    lymphadenopathy. Mild degenerative changes are seen in the lumbar spine without evidence of aggressive osseous lesions.       Pelvis: The urinary bladder is unremarkable. There are multiple phleboliths in the pelvis. There is no pelvic or inguinal lymphadenopathy. No aggressive osseous lesions are identified           Impression   1. Slightly enlarging fluid collection with pockets of gas adjacent to the gastric fundus. This finding may represent a small abscess or contained perforation. 2. Stable hypoattenuating structure in the spleen suspicious for injury. 3. Pan colonic diverticulosis.         Narrative   PROCEDURE: XR CHEST 1 VIEW       CLINICAL INFORMATION: confirm stent placement,  .       COMPARISON: No prior study.       TECHNIQUE: Single image from endoscopy Center.       FINDINGS: Single image demonstrates a esophageal stent. Exact positioning is indeterminate based on this but does appear to span the region of the lower esophageal sphincter into the stomach.           Impression   Limited operative image.        Electronically signed by Arianna Oconnor MD on 3/3/2018 at 6:34 AM

## 2018-03-03 NOTE — OP NOTE
Hemostasis adequate. Skin was reapproximated at all the incisional sites  with 4-0 Vicryl in a subcuticular fashion. Closed incisions were then  cleaned, dried, and Steri-Strips applied. Dry sterile dressings were  applied. Local anesthetic was used and the patient tolerated this well  with no apparent complications. At that time,  _____ came in and  proceeded with upper endoscopy. At the very most proximal aspect of the  sleeve there on the left/lateral side there was a defect that appeared to  be breakdown on the staple line. The rest of the sleeve overall looked  really good and well healed. The defect was probably about 2 cm in  diameter. He felt that she would be a good candidate for stent placement  as he was able to actually get it scoped through that defect and suction  out the small area of concern. It appeared to be otherwise completely  walled off by omentum. The stent was then deployed which was a 18 mm 12 cm  stent. The patient tolerated this well and what appeared to be very  nicely. Stat chest x-ray was pending. Otherwise, the patient tolerated  well and was able to be brought out of general anesthesia and transferred  to the postanesthesia care unit in stable condition.         Clearance DILIP Llanes    D: 03/02/2018 15:34:31       T: 03/02/2018 17:56:36     [email or web address]  Job#: [id number]     Doc#: [id number]    CC:

## 2018-03-04 LAB
ANION GAP SERPL CALCULATED.3IONS-SCNC: 10 MEQ/L (ref 8–16)
BUN BLDV-MCNC: 10 MG/DL (ref 7–22)
CALCIUM IONIZED: 1.18 MMOL/L (ref 1.12–1.32)
CALCIUM SERPL-MCNC: 8.9 MG/DL (ref 8.5–10.5)
CHLORIDE BLD-SCNC: 103 MEQ/L (ref 98–111)
CO2: 26 MEQ/L (ref 23–33)
CREAT SERPL-MCNC: 0.7 MG/DL (ref 0.4–1.2)
GFR SERPL CREATININE-BSD FRML MDRD: > 90 ML/MIN/1.73M2
GLUCOSE BLD-MCNC: 100 MG/DL (ref 70–108)
GLUCOSE BLD-MCNC: 110 MG/DL (ref 70–108)
GLUCOSE BLD-MCNC: 113 MG/DL (ref 70–108)
GLUCOSE BLD-MCNC: 118 MG/DL (ref 70–108)
GLUCOSE BLD-MCNC: 135 MG/DL (ref 70–108)
HCT VFR BLD CALC: 36.8 % (ref 37–47)
HEMOGLOBIN: 12.4 GM/DL (ref 12–16)
MAGNESIUM: 2.2 MG/DL (ref 1.6–2.4)
MCH RBC QN AUTO: 30.9 PG (ref 27–31)
MCHC RBC AUTO-ENTMCNC: 33.7 GM/DL (ref 33–37)
MCV RBC AUTO: 91.5 FL (ref 81–99)
PDW BLD-RTO: 14.2 % (ref 11.5–14.5)
PHOSPHORUS: 3 MG/DL (ref 2.4–4.7)
PLATELET # BLD: 235 THOU/MM3 (ref 130–400)
PMV BLD AUTO: 11.6 FL (ref 7.4–10.4)
POTASSIUM SERPL-SCNC: 4.1 MEQ/L (ref 3.5–5.2)
RBC # BLD: 4.02 MILL/MM3 (ref 4.2–5.4)
SODIUM BLD-SCNC: 139 MEQ/L (ref 135–145)
WBC # BLD: 7.4 THOU/MM3 (ref 4.8–10.8)

## 2018-03-04 PROCEDURE — 99024 POSTOP FOLLOW-UP VISIT: CPT | Performed by: SURGERY

## 2018-03-04 PROCEDURE — 1200000000 HC SEMI PRIVATE

## 2018-03-04 PROCEDURE — 36415 COLL VENOUS BLD VENIPUNCTURE: CPT

## 2018-03-04 PROCEDURE — 84100 ASSAY OF PHOSPHORUS: CPT

## 2018-03-04 PROCEDURE — 2580000003 HC RX 258: Performed by: INTERNAL MEDICINE

## 2018-03-04 PROCEDURE — 82948 REAGENT STRIP/BLOOD GLUCOSE: CPT

## 2018-03-04 PROCEDURE — 82330 ASSAY OF CALCIUM: CPT

## 2018-03-04 PROCEDURE — C9113 INJ PANTOPRAZOLE SODIUM, VIA: HCPCS | Performed by: SURGERY

## 2018-03-04 PROCEDURE — 6370000000 HC RX 637 (ALT 250 FOR IP): Performed by: SURGERY

## 2018-03-04 PROCEDURE — 80048 BASIC METABOLIC PNL TOTAL CA: CPT

## 2018-03-04 PROCEDURE — 2580000003 HC RX 258: Performed by: SURGERY

## 2018-03-04 PROCEDURE — 85027 COMPLETE CBC AUTOMATED: CPT

## 2018-03-04 PROCEDURE — 83735 ASSAY OF MAGNESIUM: CPT

## 2018-03-04 PROCEDURE — 6360000002 HC RX W HCPCS: Performed by: SURGERY

## 2018-03-04 PROCEDURE — 6360000002 HC RX W HCPCS: Performed by: INTERNAL MEDICINE

## 2018-03-04 PROCEDURE — 2500000003 HC RX 250 WO HCPCS

## 2018-03-04 RX ADMIN — METOCLOPRAMIDE 10 MG: 5 INJECTION, SOLUTION INTRAMUSCULAR; INTRAVENOUS at 08:33

## 2018-03-04 RX ADMIN — METOCLOPRAMIDE 10 MG: 5 INJECTION, SOLUTION INTRAMUSCULAR; INTRAVENOUS at 15:18

## 2018-03-04 RX ADMIN — HYDROCODONE BITARTRATE AND ACETAMINOPHEN 10 ML: 2.5; 108 SOLUTION ORAL at 04:29

## 2018-03-04 RX ADMIN — PANTOPRAZOLE SODIUM 40 MG: 40 INJECTION, POWDER, FOR SOLUTION INTRAVENOUS at 06:23

## 2018-03-04 RX ADMIN — HYDROCODONE BITARTRATE AND ACETAMINOPHEN 10 ML: 2.5; 108 SOLUTION ORAL at 15:18

## 2018-03-04 RX ADMIN — Medication 10 ML: at 06:24

## 2018-03-04 RX ADMIN — PIPERACILLIN SODIUM,TAZOBACTAM SODIUM 3.38 G: 3; .375 INJECTION, POWDER, FOR SOLUTION INTRAVENOUS at 00:12

## 2018-03-04 RX ADMIN — HYDROCODONE BITARTRATE AND ACETAMINOPHEN 10 ML: 2.5; 108 SOLUTION ORAL at 00:10

## 2018-03-04 RX ADMIN — PIPERACILLIN SODIUM,TAZOBACTAM SODIUM 3.38 G: 3; .375 INJECTION, POWDER, FOR SOLUTION INTRAVENOUS at 16:57

## 2018-03-04 RX ADMIN — ONDANSETRON 4 MG: 2 INJECTION INTRAMUSCULAR; INTRAVENOUS at 06:35

## 2018-03-04 RX ADMIN — ALUMINUM HYDROXIDE, MAGNESIUM HYDROXIDE, AND SIMETHICONE 30 ML: 200; 200; 20 SUSPENSION ORAL at 20:25

## 2018-03-04 RX ADMIN — LORAZEPAM 0.5 MG: 2 INJECTION INTRAMUSCULAR; INTRAVENOUS at 02:06

## 2018-03-04 RX ADMIN — HYDROCODONE BITARTRATE AND ACETAMINOPHEN 10 ML: 2.5; 108 SOLUTION ORAL at 09:30

## 2018-03-04 RX ADMIN — HYDRALAZINE HYDROCHLORIDE 10 MG: 20 INJECTION INTRAMUSCULAR; INTRAVENOUS at 21:09

## 2018-03-04 RX ADMIN — ENOXAPARIN SODIUM 40 MG: 40 INJECTION SUBCUTANEOUS at 08:30

## 2018-03-04 RX ADMIN — LEVOTHYROXINE SODIUM 125 MCG: 125 TABLET ORAL at 06:26

## 2018-03-04 RX ADMIN — ONDANSETRON 4 MG: 2 INJECTION INTRAMUSCULAR; INTRAVENOUS at 00:07

## 2018-03-04 RX ADMIN — PIPERACILLIN SODIUM,TAZOBACTAM SODIUM 3.38 G: 3; .375 INJECTION, POWDER, FOR SOLUTION INTRAVENOUS at 08:30

## 2018-03-04 RX ADMIN — Medication 10 ML: at 16:58

## 2018-03-04 RX ADMIN — CALCIUM GLUCONATE: 94 INJECTION, SOLUTION INTRAVENOUS at 18:30

## 2018-03-04 RX ADMIN — PANTOPRAZOLE SODIUM 40 MG: 40 INJECTION, POWDER, FOR SOLUTION INTRAVENOUS at 16:57

## 2018-03-04 RX ADMIN — DIPHENHYDRAMINE HYDROCHLORIDE 50 MG: 50 INJECTION, SOLUTION INTRAMUSCULAR; INTRAVENOUS at 18:12

## 2018-03-04 RX ADMIN — DIPHENHYDRAMINE HYDROCHLORIDE 50 MG: 50 INJECTION, SOLUTION INTRAMUSCULAR; INTRAVENOUS at 09:26

## 2018-03-04 RX ADMIN — ONDANSETRON 4 MG: 2 INJECTION INTRAMUSCULAR; INTRAVENOUS at 13:56

## 2018-03-04 RX ADMIN — HYDROCODONE BITARTRATE AND ACETAMINOPHEN 10 ML: 2.5; 108 SOLUTION ORAL at 20:25

## 2018-03-04 RX ADMIN — ONDANSETRON 4 MG: 2 INJECTION INTRAMUSCULAR; INTRAVENOUS at 20:22

## 2018-03-04 RX ADMIN — LORAZEPAM 0.5 MG: 2 INJECTION INTRAMUSCULAR; INTRAVENOUS at 21:05

## 2018-03-04 ASSESSMENT — PAIN DESCRIPTION - PAIN TYPE
TYPE: SURGICAL PAIN

## 2018-03-04 ASSESSMENT — PAIN SCALES - GENERAL
PAINLEVEL_OUTOF10: 3
PAINLEVEL_OUTOF10: 7
PAINLEVEL_OUTOF10: 0
PAINLEVEL_OUTOF10: 3
PAINLEVEL_OUTOF10: 7
PAINLEVEL_OUTOF10: 7
PAINLEVEL_OUTOF10: 0
PAINLEVEL_OUTOF10: 3
PAINLEVEL_OUTOF10: 3

## 2018-03-04 ASSESSMENT — PAIN DESCRIPTION - LOCATION
LOCATION: ABDOMEN

## 2018-03-04 NOTE — PROGRESS NOTES
[facility]  Postoperative Progress Note    Pt Name: Marcia Brandt  Medical Record Number: [id number]  Date of Birth 1990   Today's Date: 3/4/2018    ASSESSMENT   1. POD #2 Status post robotic exploration with washout and drain placement  2. POD #2 upper endoscopy with stent insertion  3. POD # 19 Robotic sleeve gastrectomy  4. Postoperative fevers  5. Postoperative intra-abdominal abscess - Possible gastric sleeve leak  6. Restless leg syndrome  7. Anxiety   has a past medical history of Anxiety; Asthma; Chronic back pain; Depression; Diabetes mellitus ([address] GERD (gastroesophageal reflux disease); Headache; Hypertension; Infertility, female; Thyroid disease; Ulcer ([address] and UTI (urinary tract infection). PLAN   1. Clear liquid diet and Protein shakes  2. IV antibiotics - infectious disease following. Hopefully home with oral/liquid antibiotics if okay with Dr. Julien Romero  3. IV fluid hydration  4. PPI  5. Placed PICC - TPN - 1/2 rate it today  6. IV pain medication  7. DVT prophylaxis with SCDs and Lovenox  8. Oral Synthroid  9. Liquid Prozac. Patient also has IV Ativan as needed for anxiety. 10. DOYLE drain serosanguineous  11. Ativan when necessary for restless legs. 12. Home 2448 hours  SUBJECTIVE   Stable overnight. Chart reviewed. Vital signs stable. Chest pressure much better. Moderate amount of heartburn earlier yesterday but this has since improved. Having some nausea. No emesis. Low-grade fevers improved. Tolerating clear liquids. Incisional tenderness. DOYLE drain serosanguineous. No lightheadedness or dizziness. No shortness of breath. Feeling better than yesterday.   CURRENT MEDICATIONS   Scheduled Meds:   levothyroxine  125 mcg Oral Daily    lidocaine 1 % injection  5 mL Intradermal Once    sodium chloride flush  10 mL Intravenous 2 times per day    sodium chloride (PF)  10 mL Intravenous 2 times per day    enoxaparin  40 mg Subcutaneous Daily    insulin lispro  0-12 Units Subcutaneous Q6H    hydrALAZINE  10 mg Intravenous Q6H    piperacillin-tazobactam  3.375 g Intravenous Q8H    FLUoxetine  20 mg Oral Daily    pantoprazole  40 mg Intravenous BID    And    sodium chloride (PF)  10 mL Intravenous BID    lidocaine 1 % injection  5 mL Intradermal Once     Continuous Infusions:   PN-Adult  3 IN 1 Central Line (Custom) 80 mL/hr at 18 1850    dextrose      lactated ringers 50 mL/hr at 18 0554     PRN Meds:.hyoscyamine, HYDROcodone-acetaminophen, HYDROcodone-acetaminophen, aluminum & magnesium hydroxide-simethicone, calcium carbonate, sodium chloride flush, lactulose, sodium chloride (PF), LORazepam, glucose, dextrose, glucagon (rDNA), dextrose, diphenhydrAMINE **OR** diphenhydrAMINE, morphine, morphine, acetaminophen, albuterol sulfate HFA, acetaminophen, ondansetron, metoclopramide, promethazine, promethazine  OBJECTIVE   CURRENT VITALS:  height is 5' 9\" (1.753 m) and weight is 320 lb (145.2 kg) (abnormal). Her oral temperature is 98 °F (36.7 °C). Her blood pressure is 132/67 and her pulse is 92. Her respiration is 18 and oxygen saturation is 94%.    Temperature Range (24h):Temp: 98 °F (36.7 °C) Temp  Av.3 °F (36.8 °C)  Min: 98 °F (36.7 °C)  Max: 98.5 °F (36.9 °C)  BP Range (01W): Systolic (80XYN), TAR:729 , Min:120 , IPW:479     Diastolic (77LIF), ZWR:09, Min:67, Max:96    Pulse Range (24h): Pulse  Av  Min: 91  Max: 107  Respiration Range (24h): Resp  Av  Min: 18  Max: 20  Current Pulse Ox (24h):  SpO2: 94 %  Pulse Ox Range (24h):  SpO2  Av.3 %  Min: 94 %  Max: 95 %  Oxygen Amount and Delivery: O2 Flow Rate (L/min): 1 L/min  Incentive Spirometry Tx: Respiratory Effort:  (encouraged use) Treatment Tolerance: Other (Comment) (encouraged pt to use)        GENERAL: alert, no distress  LUNGS: clear to ausculation, without wheezes, rales or rhonci  HEART: normal rate and regular rhythm  ABDOMEN: dose to as low as reasonably achievable.       FINDINGS:           There is some mild atelectasis in the left lung base.   The base of the heart is within appropriate limits.       Adjacent to the gastric fundus, there is a new collection which contains fluid and gas. This extends beyond the expected boundaries of the surgical site. This can represent a perforation or a small abscess. This component measures 5.3 x 1.9 cm. This is    best demonstrated on axial image 13 and coronal image 41.        The liver is normal. The spleen is of normal size. There is some fluid and fat stranding along the anterior medial border of the spleen where it abuts the gastric surgical site. There is a possible linear defect in the anterior superior corner of the    spleen. This can represent a small splenic injury.        The gallbladder is normal.    There is no hydronephrosis or stones of either kidney. No renal masses are noted.           No abnormalities of the small bowel loops are noted.  The IVC and aorta are of normal caliber. There is no adenopathy.       The urinary bladder is normal. There is no pelvic free fluid.  There is some dense oral contrast in the colon. There is diverticulosis. There is no evidence of diverticulitis.  There are phleboliths in the pelvis. The uterus and adnexa are within    appropriate limits. No suspicious osseous lesions are identified.                   Impression       1. Changes at the gastric fundus. There is a small area of fluid with adjacent air which appears separate from the gastric lumen. This may represent a perforation versus an abscess. This is new.       2. The anterior medial corner of the spleen may have a small splenic injury. There is some adjacent free fluid and linear hypoenhancement. 3. Diverticulosis         Narrative   PROCEDURE: FL UGI       CLINICAL INFORMATION: Nausea, vomiting, recent gastric surgery.       TECHNIQUE: Preliminary images of the abdomen were obtained. left pulmonary arteries. was the intravenous contrast utilized.       All CT scans at this facility use dose modulation, iterative reconstruction, and/or weight-based dosing when appropriate to reduce radiation dose to as low as reasonably achievable.       FINDINGS:         CT chest       There is adequate opacification of the pulmonary arterial system. No pulmonary emboli are present.  The aorta is within acceptable limits.       There is a pleural-based parenchymal opacity with air bronchograms beginning in the posterior lateral left costophrenic angle and extending into the lateral basilar segment of the left lower lobe. Continuous with this triangular-shaped density is    platelike atelectasis extending up through the anterior aspect of the superior segment of the left lower lobe. Additionally there is a bandlike density extending from the parenchymal consolidation through the medial basilar segment of the left lower    lobe.       The remaining lungs are clear.       No pneumothorax.       The airways are patent.       The heart size is normal. There is no pericardial or pleural effusion. [place] is no mediastinal, axillary or hilar adenopathy.       No suspicious osseous lesions are present.         Axial image 163 of series 6 shows that the medial aspect of the spleen has a well marginated geographic area of hypodensity in the subcapsular region. This extends medially and superiorly up to the dome of the left diaphragm axial image 129 series 6.       There is nonspecific fatty stranding between the lateral aspect of the stomach and the spleen.  No extracapsular splenic fluid collections.       CT abdomen pelvis:       These are delayed images acquired after the CTA of the chest. IV contrast is being actively excreted symmetrically from both normal-appearing kidneys through bilateral normal-appearing ureters and beginning to fill the normal-appearing urinary bladder.       On the CTA of the chest and this     Impression   Limited operative image.        Electronically signed by Delicia Trinh MD on 3/4/2018 at 7:44 AM

## 2018-03-04 NOTE — PLAN OF CARE
Problem: Pain:  Goal: Pain level will decrease  Pain level will decrease   Outcome: Ongoing  Pt taking pain medication on MAR as needed to control pain    Problem: Safety:  Goal: Free from accidental physical injury  Free from accidental physical injury   Outcome: Ongoing  Pt alert and oriented, able to use call light when needing assistance    Problem: Daily Care:  Goal: Daily care needs are met  Daily care needs are met   Outcome: Ongoing  Pt care needs are met as they arise    Problem: Skin Integrity:  Goal: Skin integrity will stabilize  Skin integrity will stabilize   Outcome: Ongoing  Pt has bruising on arms, 6 surgical stab sites on abdomen covered with steri strips with DOYLE george in place    Problem: Discharge Planning:  Goal: Patients continuum of care needs are met  Patients continuum of care needs are met   Outcome: Ongoing  Pt plans home on Monday     Problem: Falls - Risk of  Goal: Absence of falls  Outcome: Ongoing  Able to use call light when needing assistance, no falls this shift     Care plan reviewed with patient. Patient verbalize understanding of the plan of care and contribute to goal setting.

## 2018-03-05 LAB
GLUCOSE BLD-MCNC: 103 MG/DL (ref 70–108)
GLUCOSE BLD-MCNC: 110 MG/DL (ref 70–108)
GLUCOSE BLD-MCNC: 125 MG/DL (ref 70–108)
GLUCOSE BLD-MCNC: 98 MG/DL (ref 70–108)

## 2018-03-05 PROCEDURE — 2580000003 HC RX 258: Performed by: INTERNAL MEDICINE

## 2018-03-05 PROCEDURE — 6370000000 HC RX 637 (ALT 250 FOR IP): Performed by: SURGERY

## 2018-03-05 PROCEDURE — 2580000003 HC RX 258: Performed by: SURGERY

## 2018-03-05 PROCEDURE — C9113 INJ PANTOPRAZOLE SODIUM, VIA: HCPCS | Performed by: SURGERY

## 2018-03-05 PROCEDURE — 1200000000 HC SEMI PRIVATE

## 2018-03-05 PROCEDURE — 6360000002 HC RX W HCPCS: Performed by: INTERNAL MEDICINE

## 2018-03-05 PROCEDURE — 82948 REAGENT STRIP/BLOOD GLUCOSE: CPT

## 2018-03-05 PROCEDURE — 99024 POSTOP FOLLOW-UP VISIT: CPT | Performed by: SURGERY

## 2018-03-05 PROCEDURE — 6360000002 HC RX W HCPCS: Performed by: SURGERY

## 2018-03-05 RX ADMIN — DIPHENHYDRAMINE HYDROCHLORIDE 50 MG: 50 INJECTION, SOLUTION INTRAMUSCULAR; INTRAVENOUS at 02:15

## 2018-03-05 RX ADMIN — PIPERACILLIN SODIUM,TAZOBACTAM SODIUM 3.38 G: 3; .375 INJECTION, POWDER, FOR SOLUTION INTRAVENOUS at 16:18

## 2018-03-05 RX ADMIN — ONDANSETRON 4 MG: 2 INJECTION INTRAMUSCULAR; INTRAVENOUS at 02:19

## 2018-03-05 RX ADMIN — ONDANSETRON 4 MG: 2 INJECTION INTRAMUSCULAR; INTRAVENOUS at 19:33

## 2018-03-05 RX ADMIN — Medication 10 ML: at 21:13

## 2018-03-05 RX ADMIN — PANTOPRAZOLE SODIUM 40 MG: 40 INJECTION, POWDER, FOR SOLUTION INTRAVENOUS at 06:28

## 2018-03-05 RX ADMIN — LORAZEPAM 0.5 MG: 2 INJECTION INTRAMUSCULAR; INTRAVENOUS at 16:03

## 2018-03-05 RX ADMIN — PIPERACILLIN SODIUM,TAZOBACTAM SODIUM 3.38 G: 3; .375 INJECTION, POWDER, FOR SOLUTION INTRAVENOUS at 00:27

## 2018-03-05 RX ADMIN — Medication 20 MG: at 14:02

## 2018-03-05 RX ADMIN — DIPHENHYDRAMINE HYDROCHLORIDE 50 MG: 50 INJECTION, SOLUTION INTRAMUSCULAR; INTRAVENOUS at 19:33

## 2018-03-05 RX ADMIN — Medication 10 ML: at 06:31

## 2018-03-05 RX ADMIN — HYDRALAZINE HYDROCHLORIDE 10 MG: 20 INJECTION INTRAMUSCULAR; INTRAVENOUS at 04:32

## 2018-03-05 RX ADMIN — Medication 10 ML: at 16:03

## 2018-03-05 RX ADMIN — HYDROCODONE BITARTRATE AND ACETAMINOPHEN 10 ML: 2.5; 108 SOLUTION ORAL at 04:34

## 2018-03-05 RX ADMIN — PANTOPRAZOLE SODIUM 40 MG: 40 INJECTION, POWDER, FOR SOLUTION INTRAVENOUS at 16:03

## 2018-03-05 RX ADMIN — HYDROCODONE BITARTRATE AND ACETAMINOPHEN 5 ML: 2.5; 108 SOLUTION ORAL at 21:19

## 2018-03-05 RX ADMIN — ENOXAPARIN SODIUM 40 MG: 40 INJECTION SUBCUTANEOUS at 08:00

## 2018-03-05 RX ADMIN — PIPERACILLIN SODIUM,TAZOBACTAM SODIUM 3.38 G: 3; .375 INJECTION, POWDER, FOR SOLUTION INTRAVENOUS at 08:00

## 2018-03-05 RX ADMIN — METOCLOPRAMIDE 10 MG: 5 INJECTION, SOLUTION INTRAMUSCULAR; INTRAVENOUS at 17:12

## 2018-03-05 RX ADMIN — METOCLOPRAMIDE 10 MG: 5 INJECTION, SOLUTION INTRAMUSCULAR; INTRAVENOUS at 00:34

## 2018-03-05 RX ADMIN — Medication 10 ML: at 06:29

## 2018-03-05 RX ADMIN — HYDROCODONE BITARTRATE AND ACETAMINOPHEN 10 ML: 2.5; 108 SOLUTION ORAL at 00:33

## 2018-03-05 RX ADMIN — LEVOTHYROXINE SODIUM 125 MCG: 125 TABLET ORAL at 06:28

## 2018-03-05 RX ADMIN — DIPHENHYDRAMINE HYDROCHLORIDE 50 MG: 50 INJECTION, SOLUTION INTRAMUSCULAR; INTRAVENOUS at 10:59

## 2018-03-05 RX ADMIN — METOCLOPRAMIDE 10 MG: 5 INJECTION, SOLUTION INTRAMUSCULAR; INTRAVENOUS at 08:00

## 2018-03-05 RX ADMIN — PROMETHAZINE HYDROCHLORIDE 25 MG: 25 SUPPOSITORY RECTAL at 21:19

## 2018-03-05 RX ADMIN — ONDANSETRON 4 MG: 2 INJECTION INTRAMUSCULAR; INTRAVENOUS at 12:38

## 2018-03-05 RX ADMIN — LORAZEPAM 0.5 MG: 2 INJECTION INTRAMUSCULAR; INTRAVENOUS at 08:40

## 2018-03-05 ASSESSMENT — PAIN SCALES - GENERAL
PAINLEVEL_OUTOF10: 3
PAINLEVEL_OUTOF10: 2
PAINLEVEL_OUTOF10: 0
PAINLEVEL_OUTOF10: 2
PAINLEVEL_OUTOF10: 4
PAINLEVEL_OUTOF10: 0
PAINLEVEL_OUTOF10: 3
PAINLEVEL_OUTOF10: 2
PAINLEVEL_OUTOF10: 2

## 2018-03-05 ASSESSMENT — PAIN DESCRIPTION - PAIN TYPE
TYPE: SURGICAL PAIN
TYPE: SURGICAL PAIN
TYPE: ACUTE PAIN;SURGICAL PAIN

## 2018-03-05 ASSESSMENT — PAIN DESCRIPTION - FREQUENCY: FREQUENCY: CONTINUOUS

## 2018-03-05 ASSESSMENT — PAIN DESCRIPTION - LOCATION
LOCATION: ABDOMEN

## 2018-03-05 ASSESSMENT — PAIN DESCRIPTION - PROGRESSION
CLINICAL_PROGRESSION: NOT CHANGED

## 2018-03-05 ASSESSMENT — PAIN DESCRIPTION - ONSET: ONSET: ON-GOING

## 2018-03-05 ASSESSMENT — PAIN DESCRIPTION - DESCRIPTORS: DESCRIPTORS: ACHING

## 2018-03-05 NOTE — PROGRESS NOTES
 sodium chloride flush  10 mL Intravenous 2 times per day    sodium chloride (PF)  10 mL Intravenous 2 times per day    enoxaparin  40 mg Subcutaneous Daily    insulin lispro  0-12 Units Subcutaneous Q6H    hydrALAZINE  10 mg Intravenous Q6H    piperacillin-tazobactam  3.375 g Intravenous Q8H    FLUoxetine  20 mg Oral Daily    pantoprazole  40 mg Intravenous BID    And    sodium chloride (PF)  10 mL Intravenous BID    lidocaine 1 % injection  5 mL Intradermal Once     Continuous Infusions:   PN-Adult  3 IN 1 Central Line (Custom) 80 mL/hr at 18 1830    dextrose      lactated ringers 20 mL/hr at 18 0831     PRN Meds:.hyoscyamine, HYDROcodone-acetaminophen, HYDROcodone-acetaminophen, aluminum & magnesium hydroxide-simethicone, calcium carbonate, sodium chloride flush, lactulose, sodium chloride (PF), LORazepam, glucose, dextrose, glucagon (rDNA), dextrose, diphenhydrAMINE **OR** diphenhydrAMINE, morphine, morphine, acetaminophen, albuterol sulfate HFA, acetaminophen, ondansetron, metoclopramide, promethazine, promethazine  OBJECTIVE   CURRENT VITALS:  height is 5' 9\" (1.753 m) and weight is 320 lb (145.2 kg) (abnormal). Her oral temperature is 98.1 °F (36.7 °C). Her blood pressure is 150/87 (abnormal) and her pulse is 93. Her respiration is 18 and oxygen saturation is 94%.    Temperature Range (24h):Temp: 98.1 °F (36.7 °C) Temp  Av.1 °F (36.7 °C)  Min: 97.7 °F (36.5 °C)  Max: 98.3 °F (36.8 °C)  BP Range (36Z): Systolic (50DPD), MBW:177 , Min:118 , VAY:444     Diastolic (99HDK), WEM:25, Min:74, Max:91    Pulse Range (24h): Pulse  Av.2  Min: 88  Max: 108  Respiration Range (24h): Resp  Av.2  Min: 16  Max: 18  Current Pulse Ox (24h):  SpO2: 94 %  Pulse Ox Range (24h):  SpO2  Av.4 %  Min: 93 %  Max: 97 %  Oxygen Amount and Delivery: O2 Flow Rate (L/min): 1 L/min  Incentive Spirometry Tx: Respiratory Effort:  (encouraged use) Treatment Tolerance: Other (Comment) (encouraged pt obtained.       All CT scans at this facility use dose modulation, iterative reconstruction, and/or weight-based dosing when appropriate to reduce radiation dose to as low as reasonably achievable.       FINDINGS:           There is some mild atelectasis in the left lung base.   The base of the heart is within appropriate limits.       Adjacent to the gastric fundus, there is a new collection which contains fluid and gas. This extends beyond the expected boundaries of the surgical site. This can represent a perforation or a small abscess. This component measures 5.3 x 1.9 cm. This is    best demonstrated on axial image 13 and coronal image 41.        The liver is normal. The spleen is of normal size. There is some fluid and fat stranding along the anterior medial border of the spleen where it abuts the gastric surgical site. There is a possible linear defect in the anterior superior corner of the    spleen. This can represent a small splenic injury.        The gallbladder is normal.    There is no hydronephrosis or stones of either kidney. No renal masses are noted.           No abnormalities of the small bowel loops are noted.  The IVC and aorta are of normal caliber. There is no adenopathy.       The urinary bladder is normal. There is no pelvic free fluid.  There is some dense oral contrast in the colon. There is diverticulosis. There is no evidence of diverticulitis.  There are phleboliths in the pelvis. The uterus and adnexa are within    appropriate limits. No suspicious osseous lesions are identified.                   Impression       1. Changes at the gastric fundus. There is a small area of fluid with adjacent air which appears separate from the gastric lumen. This may represent a perforation versus an abscess. This is new.       2. The anterior medial corner of the spleen may have a small splenic injury. There is some adjacent free fluid and linear hypoenhancement.    3. Diverticulosis         Narrative this facility use dose modulation, iterative reconstruction, and/or weight-based dosing when appropriate to reduce radiation dose to as low as reasonably achievable.       COMPARISON: CT abdomen and pelvis 2/24/2018       FINDINGS:        Lower thorax: There is a small left-sided pleural effusion with minimal adjacent lung opacification.       Abdomen: There is surgical changes in the stomach of prior sleeve gastrectomy. A fluid collection bordering the gastric fundus contains pockets of gas and now measures approximately 3.5 x 2.3 cm (image 18; prior measurement 2.3 x 2.5 cm). Mesenteric    stranding adjacent to the stomach is stable. An area of hypoattenuation in the posterior spleen adjacent to the fluid collection is stable. Bowel is normal in course and caliber without evidence of obstruction. There are scattered diverticula throughout    the colon without evidence of diverticulitis. The liver, gallbladder, pancreas, adrenal glands, kidneys and abdominal aorta are normal. Prominent retroperitoneal lymph nodes in the upper abdomen are likely reactive. There is no mesenteric    lymphadenopathy. Mild degenerative changes are seen in the lumbar spine without evidence of aggressive osseous lesions.       Pelvis: The urinary bladder is unremarkable. There are multiple phleboliths in the pelvis. There is no pelvic or inguinal lymphadenopathy. No aggressive osseous lesions are identified           Impression   1. Slightly enlarging fluid collection with pockets of gas adjacent to the gastric fundus. This finding may represent a small abscess or contained perforation. 2. Stable hypoattenuating structure in the spleen suspicious for injury.    3. Pan colonic diverticulosis.         Narrative   PROCEDURE: XR CHEST 1 VIEW       CLINICAL INFORMATION: confirm stent placement,  .       COMPARISON: No prior study.       TECHNIQUE: Single image from endoscopy Center.       FINDINGS: Single image demonstrates a esophageal

## 2018-03-05 NOTE — PLAN OF CARE
Problem: Pain:  Goal: Pain level will decrease  Pain level will decrease   Outcome: Ongoing  Pt taking pain medication on MAR as needed to control pain     Problem: Safety:  Goal: Free from accidental physical injury  Free from accidental physical injury   Outcome: Ongoing  Pt alert and oriented, able to use call light when needing assistance     Problem: Daily Care:  Goal: Daily care needs are met  Daily care needs are met   Outcome: Ongoing  Pt care needs are met as they arise     Problem: Skin Integrity:  Goal: Skin integrity will stabilize  Skin integrity will stabilize   Outcome: Ongoing  Pt has bruising on arms, 6 surgical stab sites on abdomen covered with steri strips with DOYLE drain in place     Problem: Discharge Planning:  Goal: Patients continuum of care needs are met  Patients continuum of care needs are met   Outcome: Ongoing  Pt plans home in 1-2 days    Problem: Falls - Risk of  Goal: Absence of falls  Outcome: Ongoing  Able to use call light when needing assistance, no falls this shift      Care plan reviewed with patient. Patient verbalize understanding of the plan of care and contribute to goal setting.

## 2018-03-06 VITALS
OXYGEN SATURATION: 94 % | RESPIRATION RATE: 16 BRPM | TEMPERATURE: 98 F | HEART RATE: 92 BPM | SYSTOLIC BLOOD PRESSURE: 141 MMHG | DIASTOLIC BLOOD PRESSURE: 79 MMHG | HEIGHT: 69 IN | BODY MASS INDEX: 43.4 KG/M2 | WEIGHT: 293 LBS

## 2018-03-06 LAB
GLUCOSE BLD-MCNC: 113 MG/DL (ref 70–108)
GLUCOSE BLD-MCNC: 94 MG/DL (ref 70–108)

## 2018-03-06 PROCEDURE — 2580000003 HC RX 258: Performed by: INTERNAL MEDICINE

## 2018-03-06 PROCEDURE — 82948 REAGENT STRIP/BLOOD GLUCOSE: CPT

## 2018-03-06 PROCEDURE — 6360000002 HC RX W HCPCS: Performed by: INTERNAL MEDICINE

## 2018-03-06 PROCEDURE — APPSS30 APP SPLIT SHARED TIME 16-30 MINUTES: Performed by: NURSE PRACTITIONER

## 2018-03-06 PROCEDURE — 99024 POSTOP FOLLOW-UP VISIT: CPT | Performed by: NURSE PRACTITIONER

## 2018-03-06 PROCEDURE — C9113 INJ PANTOPRAZOLE SODIUM, VIA: HCPCS | Performed by: SURGERY

## 2018-03-06 PROCEDURE — 6360000002 HC RX W HCPCS: Performed by: SURGERY

## 2018-03-06 PROCEDURE — 6370000000 HC RX 637 (ALT 250 FOR IP): Performed by: SURGERY

## 2018-03-06 PROCEDURE — 2580000003 HC RX 258: Performed by: SURGERY

## 2018-03-06 RX ORDER — PROMETHAZINE HYDROCHLORIDE 25 MG/1
25 SUPPOSITORY RECTAL EVERY 6 HOURS PRN
Qty: 10 SUPPOSITORY | Refills: 1 | Status: SHIPPED | OUTPATIENT
Start: 2018-03-06 | End: 2018-03-14 | Stop reason: SDUPTHER

## 2018-03-06 RX ORDER — AMOXICILLIN AND CLAVULANATE POTASSIUM 250; 62.5 MG/5ML; MG/5ML
POWDER, FOR SUSPENSION ORAL
Qty: 100 ML | Refills: 0
Start: 2018-03-06 | End: 2018-03-12 | Stop reason: ALTCHOICE

## 2018-03-06 RX ORDER — FLUOXETINE HYDROCHLORIDE 20 MG/5ML
20 LIQUID ORAL DAILY
Qty: 150 ML | Refills: 1 | Status: SHIPPED | OUTPATIENT
Start: 2018-03-06 | End: 2018-11-04 | Stop reason: ALTCHOICE

## 2018-03-06 RX ORDER — ONDANSETRON 4 MG/1
4 TABLET, ORALLY DISINTEGRATING ORAL EVERY 6 HOURS PRN
Qty: 40 TABLET | Refills: 0 | Status: SHIPPED | OUTPATIENT
Start: 2018-03-06 | End: 2018-03-20

## 2018-03-06 RX ADMIN — ONDANSETRON 4 MG: 2 INJECTION INTRAMUSCULAR; INTRAVENOUS at 05:15

## 2018-03-06 RX ADMIN — PANTOPRAZOLE SODIUM 40 MG: 40 INJECTION, POWDER, FOR SOLUTION INTRAVENOUS at 06:06

## 2018-03-06 RX ADMIN — PIPERACILLIN SODIUM,TAZOBACTAM SODIUM 3.38 G: 3; .375 INJECTION, POWDER, FOR SOLUTION INTRAVENOUS at 01:04

## 2018-03-06 RX ADMIN — HYDROCODONE BITARTRATE AND ACETAMINOPHEN 5 ML: 2.5; 108 SOLUTION ORAL at 05:14

## 2018-03-06 RX ADMIN — HYDROCODONE BITARTRATE AND ACETAMINOPHEN 10 ML: 2.5; 108 SOLUTION ORAL at 09:50

## 2018-03-06 RX ADMIN — LEVOTHYROXINE SODIUM 125 MCG: 125 TABLET ORAL at 06:06

## 2018-03-06 RX ADMIN — Medication 10 ML: at 06:07

## 2018-03-06 RX ADMIN — LORAZEPAM 0.5 MG: 2 INJECTION INTRAMUSCULAR; INTRAVENOUS at 03:29

## 2018-03-06 RX ADMIN — Medication 10 ML: at 08:15

## 2018-03-06 RX ADMIN — PIPERACILLIN SODIUM,TAZOBACTAM SODIUM 3.38 G: 3; .375 INJECTION, POWDER, FOR SOLUTION INTRAVENOUS at 08:15

## 2018-03-06 RX ADMIN — DIPHENHYDRAMINE HYDROCHLORIDE 50 MG: 50 INJECTION, SOLUTION INTRAMUSCULAR; INTRAVENOUS at 03:32

## 2018-03-06 RX ADMIN — Medication 10 ML: at 08:16

## 2018-03-06 RX ADMIN — ENOXAPARIN SODIUM 40 MG: 40 INJECTION SUBCUTANEOUS at 08:30

## 2018-03-06 RX ADMIN — Medication 20 MG: at 15:22

## 2018-03-06 RX ADMIN — CALCIUM CARBONATE 500 MG: 500 TABLET, CHEWABLE ORAL at 12:11

## 2018-03-06 ASSESSMENT — PAIN SCALES - GENERAL
PAINLEVEL_OUTOF10: 0
PAINLEVEL_OUTOF10: 4
PAINLEVEL_OUTOF10: 0
PAINLEVEL_OUTOF10: 0
PAINLEVEL_OUTOF10: 7

## 2018-03-06 ASSESSMENT — PAIN DESCRIPTION - PROGRESSION
CLINICAL_PROGRESSION: NOT CHANGED
CLINICAL_PROGRESSION: NOT CHANGED

## 2018-03-06 NOTE — PLAN OF CARE
Problem: Pain:  Goal: Pain level will decrease  Pain level will decrease   Outcome: Ongoing  Patient has complained of pain in the abdomen with a rating of 4 on 0-10 scale. PRN PO hycet given to help achieve patient's stated pain goal of 2. Problem: Safety:  Goal: Free from accidental physical injury  Free from accidental physical injury   Outcome: Ongoing  Patient has remained free of accidental injury during this shift. Problem: Daily Care:  Goal: Daily care needs are met  Daily care needs are met   Outcome: Ongoing  Patient is able to complete daily care needs independently. Problem: Skin Integrity:  Goal: Skin integrity will stabilize  Skin integrity will stabilize   Outcome: Ongoing  Patient has a surgical puncture sites on the abdomen. Dressing remains dry and intact. Bruising scattered throughout body. No other skin issues noted during this shift. Problem: Discharge Planning:  Goal: Patients continuum of care needs are met  Patients continuum of care needs are met   Outcome: Ongoing  Patient is planning to return home at discharge. Case management and [facility] services assisting with discharge needs. Problem: Falls - Risk of  Goal: Absence of falls  Outcome: Ongoing  Patient has remained free of falls during this shift. Fall prevention measures in place. Patient complaint with using call light for assistance when needed. Comments: Care plan reviewed with patient. Patient verbalize understanding of the plan of care and contribute to goal setting.

## 2018-03-06 NOTE — PROGRESS NOTES
Trevon Aguilar covering for Dr. Allen Tabor  Postoperative Progress Note    Pt Name: Rebecca Swan Record Number: [id number]  Date of Birth 1990   Today's Date: 3/6/2018    ASSESSMENT   1. POD #4 Status post robotic exploration with washout and drain placement  2. POD #4 upper endoscopy with stent insertion  3. POD # 21 Robotic sleeve gastrectomy  4. Postoperative fevers resolved  5. Postoperative intra-abdominal abscess - Possible gastric sleeve leak  6. Restless leg syndrome  7. Anxiety - improved    has a past medical history of Anxiety; Asthma; Chronic back pain; Depression; Diabetes mellitus ([address] GERD (gastroesophageal reflux disease); Headache; Hypertension; Infertility, female; Thyroid disease; Ulcer ([address] and UTI (urinary tract infection). PLAN   1. Liquid diet and protein shakes only  2. IV antibiotics switched to PO Augmentin. ID following. Follow up as needed. 3. IV fluid hydration   4. PPI  5. PICC line in place. TPN stopped last night. Remove prior to discharge  6. Pain & nausea control   7. GI following for stent placement. Follow up in 4 weeks for stent removal.   8. DVT prophylaxis with SCDs and Lovenox  9. Liquid Prozac. Anxiety better. Discussed not wanting to send patient home with prescription at this time. Patient understands. 10. DOYLE drain serous - keep in place until next week in office. Discussed DOYLE mcgee. 11. Chest x-ray prior to appointment next week   12. Patient would like to go home today. Nausea better under control. She tolerated one protein shake yesterday. Discussed with patient the importance of ambulation and getting up and moving around. Patient states pain and nausea is better today though. Follow up Monday in office for drain removal. Discussed diet and discharge instructions. SUBJECTIVE   Stable overnight. Chart reviewed. Vital signs stable. No fevers. Denies chest pressure ths morning.  Denies heartburn. Patient states she has intermittent nausea. Controlled with Phenergan and Zofran. No dry heaves or emesis. No fevers. Encouraged liquids and protein shakes. Was able to tolerate one yesterday. Incisional tenderness - no signs of infection. DOYLE drain serous - not much output. Discussed DOYLE drain care at home. No lightheadedness or dizziness. No shortness of breath. Feeling better. Urinating spontaneously. Having bowel movements. CURRENT MEDICATIONS   Scheduled Meds:   levothyroxine  125 mcg Oral Daily    lidocaine 1 % injection  5 mL Intradermal Once    sodium chloride flush  10 mL Intravenous 2 times per day    sodium chloride (PF)  10 mL Intravenous 2 times per day    enoxaparin  40 mg Subcutaneous Daily    insulin lispro  0-12 Units Subcutaneous Q6H    hydrALAZINE  10 mg Intravenous Q6H    piperacillin-tazobactam  3.375 g Intravenous Q8H    FLUoxetine  20 mg Oral Daily    pantoprazole  40 mg Intravenous BID    And    sodium chloride (PF)  10 mL Intravenous BID    lidocaine 1 % injection  5 mL Intradermal Once     Continuous Infusions:   dextrose      lactated ringers 20 mL/hr at 18 0831     PRN Meds:.hyoscyamine, HYDROcodone-acetaminophen, HYDROcodone-acetaminophen, aluminum & magnesium hydroxide-simethicone, calcium carbonate, sodium chloride flush, lactulose, sodium chloride (PF), LORazepam, glucose, dextrose, glucagon (rDNA), dextrose, diphenhydrAMINE **OR** diphenhydrAMINE, morphine, morphine, acetaminophen, albuterol sulfate HFA, acetaminophen, ondansetron, metoclopramide, promethazine, promethazine  OBJECTIVE   CURRENT VITALS:  height is 5' 9\" (1.753 m) and weight is 320 lb (145.2 kg) (abnormal). Her oral temperature is 97.9 °F (36.6 °C). Her blood pressure is 131/77 and her pulse is 96. Her respiration is 16 and oxygen saturation is 94%.    Temperature Range (24h):Temp: 97.9 °F (36.6 °C) Temp  Av.4 °F (36.9 °C)  Min: 97.9 °F (36.6 °C)  Max: 99.5 °F (37.5 °C)  BP Range (03D): Systolic (87BNB), KIRILL:926 , Min:104 , CTT:693     Diastolic (76SIB), MTJ:20, Min:54, Max:77    Pulse Range (24h): Pulse  Av.6  Min: 89  Max: 112  Respiration Range (24h): Resp  Av  Min: 16  Max: 16  Current Pulse Ox (24h):  SpO2: 94 %  Pulse Ox Range (24h):  SpO2  Av.8 %  Min: 93 %  Max: 96 %  Oxygen Amount and Delivery: O2 Flow Rate (L/min): 0 L/min  Incentive Spirometry Tx: Respiratory Effort:  (encouraged use) Treatment Tolerance: Other (Comment) (encouraged pt to use)        GENERAL: alert, no distress  LUNGS: clear to ausculation, without wheezes, rales or rhonci  HEART: normal rate and regular rhythm  ABDOMEN: non-distended, soft, incisional tenderness, bowel sounds present. No guarding or peritoneal signs. DOYLE drain serous. INCISION: healing well, no significant drainage, no significant erythema  EXTERMITY: no cyanosis, clubbing or edema    In: 550 [P.O.:550]  Out: 0     Date 18 0000 - 18 2359   Shift 6942-9531 4041-5173 7002-9076 24 Hour Total   I  N  T  A  K  E   P.O. 275   275    Shift Total  (mL/kg) 275  (1.9)   275  (1.9)   O  U  T  P  U  T   Drains 0   0    Shift Total  (mL/kg) 0  (0)   0  (0)   Weight (kg) 145. 1 145. 1 145. 1 145.1     LABS     Recent Labs      18   0547   WBC  7.4   HGB  12.4   HCT  36.8*   PLT  235   NA  139   K  4.1   CL  103   CO2  26   BUN  10   CREATININE  0.7   MG  2.2   PHOS  3.0   CALCIUM  8.9      RADIOLOGY      Narrative   PROCEDURE: CT ABDOMEN PELVIS W IV CONTRAST       CLINICAL INFORMATION: ABD PAIN, FEVER, POST-OP, NO ABSCESS ON CT WITHIN THE WEEK,  . Mid abdominal pain with fever, nausea and vomiting.  Gastric sleeve surgery 2 weeks ago.       COMPARISON: 2/15/2018.       TECHNIQUE: 5 mm axial CT images were obtained through the abdomen and pelvis after the administration of intravenous and oral contrast. Coronal and sagittal reconstructions were obtained.       All CT scans at this facility use dose modulation, iterative reconstruction, and/or weight-based dosing when appropriate to reduce radiation dose to as low as reasonably achievable.       FINDINGS:           There is some mild atelectasis in the left lung base.   The base of the heart is within appropriate limits.       Adjacent to the gastric fundus, there is a new collection which contains fluid and gas. This extends beyond the expected boundaries of the surgical site. This can represent a perforation or a small abscess. This component measures 5.3 x 1.9 cm. This is    best demonstrated on axial image 13 and coronal image 41.        The liver is normal. The spleen is of normal size. There is some fluid and fat stranding along the anterior medial border of the spleen where it abuts the gastric surgical site. There is a possible linear defect in the anterior superior corner of the    spleen. This can represent a small splenic injury.        The gallbladder is normal.    There is no hydronephrosis or stones of either kidney. No renal masses are noted.           No abnormalities of the small bowel loops are noted.  The IVC and aorta are of normal caliber. There is no adenopathy.       The urinary bladder is normal. There is no pelvic free fluid.  There is some dense oral contrast in the colon. There is diverticulosis. There is no evidence of diverticulitis.  There are phleboliths in the pelvis. The uterus and adnexa are within    appropriate limits. No suspicious osseous lesions are identified.                   Impression       1. Changes at the gastric fundus. There is a small area of fluid with adjacent air which appears separate from the gastric lumen. This may represent a perforation versus an abscess. This is new.       2. The anterior medial corner of the spleen may have a small splenic injury. There is some adjacent free fluid and linear hypoenhancement.    3. Diverticulosis         Narrative   PROCEDURE: FL UGI       CLINICAL INFORMATION: Nausea, vomiting, recent gastric surgery.       TECHNIQUE: Preliminary images of the abdomen were obtained. Following the oral ingestion of dilute Gastrografin and thin barium, the anatomy of the distal esophagus, stomach and duodenum were evaluated in a limited upper GI examination. A total of 19    spot images were obtained. Total fluoroscopy time 1.0 minutes.       COMPARISON: Upper GI examination 2/13/2018       FINDINGS:        Preliminary image demonstrates a nonobstructive bowel gas pattern. Surgical suture material is present in the left upper quadrant of the abdomen. Osseous structures are unremarkable.       There is no stenosis or gastroesophageal reflux in the distal esophagus. No large hiatal hernia is identified. There are surgical changes in the stomach of prior sleeve gastrectomy. The gastric contour is stable compared to the prior study. There is    normal transit of contrast through the stomach and into the duodenum. There is no extravasation of contrast to suggest a leak. The duodenal sweep is normal.           Impression       No evidence of obstruction or leak. Narrative   PROCEDURE: XR CHEST (2 VW)       CLINICAL INFORMATION: Cough and fever       TECHNIQUE: PA and lateral chest radiographs.       COMPARISON: [facility] chest radiograph 2/21/2018       FINDINGS: Cardiomediastinal silhouette is within normal limits. Lungs are clear. Mild endplate changes are present in the thoracic spine. There is contrast from same-day upper GI examination in the upper abdomen. .           Impression       No acute intrathoracic process.      Narrative   PROCEDURE: CTA CHEST W WO CONTRAST, CT ABDOMEN PELVIS W IV CONTRAST       CLINICAL INFORMATION: CHEST PAIN, ACUTE, PULMONARY EMBOLISM SUSPECTED, .       COMPARISON: No prior study.       TECHNIQUE: 1.5 mm axial images were obtained through the chest, abdomen and pelvis after the administration of IV contrast.  A non-contrast localizer was obtained.  3D reconstructions were performed weight-based dosing when appropriate to reduce radiation dose to as low as reasonably achievable.       COMPARISON: CT abdomen and pelvis 2/24/2018       FINDINGS:        Lower thorax: There is a small left-sided pleural effusion with minimal adjacent lung opacification.       Abdomen: There is surgical changes in the stomach of prior sleeve gastrectomy. A fluid collection bordering the gastric fundus contains pockets of gas and now measures approximately 3.5 x 2.3 cm (image 18; prior measurement 2.3 x 2.5 cm). Mesenteric    stranding adjacent to the stomach is stable. An area of hypoattenuation in the posterior spleen adjacent to the fluid collection is stable. Bowel is normal in course and caliber without evidence of obstruction. There are scattered diverticula throughout    the colon without evidence of diverticulitis. The liver, gallbladder, pancreas, adrenal glands, kidneys and abdominal aorta are normal. Prominent retroperitoneal lymph nodes in the upper abdomen are likely reactive. There is no mesenteric    lymphadenopathy. Mild degenerative changes are seen in the lumbar spine without evidence of aggressive osseous lesions.       Pelvis: The urinary bladder is unremarkable. There are multiple phleboliths in the pelvis. There is no pelvic or inguinal lymphadenopathy. No aggressive osseous lesions are identified           Impression   1. Slightly enlarging fluid collection with pockets of gas adjacent to the gastric fundus. This finding may represent a small abscess or contained perforation. 2. Stable hypoattenuating structure in the spleen suspicious for injury. 3. Pan colonic diverticulosis.         Narrative   PROCEDURE: XR CHEST 1 VIEW       CLINICAL INFORMATION: confirm stent placement,  .       COMPARISON: No prior study.       TECHNIQUE: Single image from endoscopy Center.       FINDINGS: Single image demonstrates a esophageal stent.  Exact positioning is indeterminate based on this but does appear to span the region of the lower esophageal sphincter into the stomach.           Impression   Limited operative image.      Electronically signed by Nuha Martinez CNP on 3/6/2018 at 9:38 AM   Patient tolerating diet  Regular discharge today  Will go home with drain in place to follow up in the office next week with Augustine [place]

## 2018-03-06 NOTE — PROGRESS NOTES
IV therapy called to floor to remove central line. Patient instructed to stay in bed for 1 hr post removal and to keep dressing clean dry and intact for 24-48 hrs post central line removal. Patient denies questions at this time.  Primary RN notified

## 2018-03-08 ENCOUNTER — HOSPITAL ENCOUNTER (EMERGENCY)
Age: 28
Discharge: HOME | End: 2018-03-08
Attending: FAMILY MEDICINE
Payer: COMMERCIAL

## 2018-03-08 ENCOUNTER — TELEPHONE (OUTPATIENT)
Dept: BARIATRICS/WEIGHT MGMT | Age: 28
End: 2018-03-08

## 2018-03-08 ENCOUNTER — APPOINTMENT (OUTPATIENT)
Dept: CT IMAGING | Age: 28
End: 2018-03-08
Payer: COMMERCIAL

## 2018-03-08 VITALS
OXYGEN SATURATION: 99 % | BODY MASS INDEX: 44.41 KG/M2 | HEIGHT: 68 IN | HEART RATE: 94 BPM | RESPIRATION RATE: 18 BRPM | WEIGHT: 293 LBS | DIASTOLIC BLOOD PRESSURE: 74 MMHG | TEMPERATURE: 97.6 F | SYSTOLIC BLOOD PRESSURE: 141 MMHG

## 2018-03-08 DIAGNOSIS — Z98.890 STATUS POST SURGERY: ICD-10-CM

## 2018-03-08 DIAGNOSIS — R11.2 PONV (POSTOPERATIVE NAUSEA AND VOMITING): Primary | ICD-10-CM

## 2018-03-08 DIAGNOSIS — Z98.890 PONV (POSTOPERATIVE NAUSEA AND VOMITING): Primary | ICD-10-CM

## 2018-03-08 DIAGNOSIS — R11.2 NON-INTRACTABLE VOMITING WITH NAUSEA, UNSPECIFIED VOMITING TYPE: Primary | ICD-10-CM

## 2018-03-08 LAB
ALBUMIN SERPL-MCNC: 2.8 GM/DL (ref 3.4–5)
ALP BLD-CCNC: 113 U/L (ref 46–116)
ALT SERPL-CCNC: 250 U/L (ref 14–63)
AMYLASE: 41 U/L (ref 25–115)
ANION GAP: 13 MEQ/L (ref 8–16)
AST SERPL-CCNC: 132 U/L (ref 15–37)
BILIRUB SERPL-MCNC: 0.8 MG/DL (ref 0.2–1)
BILIRUBIN DIRECT: 0.42 MG/DL (ref 0–0.2)
BUN BLDV-MCNC: 11 MG/DL (ref 7–18)
CHLORIDE BLD-SCNC: 101 MEQ/L (ref 98–107)
CO2: 24 MEQ/L (ref 21–32)
CREAT SERPL-MCNC: 1 MG/DL (ref 0.6–1.3)
GFR, ESTIMATED: 69 ML/MIN/1.73M2
GLUCOSE BLD-MCNC: 127 MG/DL (ref 74–106)
HCT VFR BLD CALC: 40.1 % (ref 37–47)
HEMOGLOBIN: 13.4 GM/DL (ref 12–16)
LIPASE: 282 U/L (ref 73–393)
MCH RBC QN AUTO: 30 PG (ref 27–31)
MCHC RBC AUTO-ENTMCNC: 33.4 GM/DL (ref 33–37)
MCV RBC AUTO: 89.8 FL (ref 81–99)
PDW BLD-RTO: 13 % (ref 11.5–14.5)
PLATELET # BLD: 254 THOU/MM3 (ref 130–400)
PMV BLD AUTO: 9.7 FL (ref 7.4–10.4)
POC CALCIUM: 9.2 MG/DL (ref 8.5–10.1)
POTASSIUM SERPL-SCNC: 3.7 MEQ/L (ref 3.5–5.1)
RBC # BLD: 4.47 MILL/MM3 (ref 4.2–5.4)
SODIUM BLD-SCNC: 138 MEQ/L (ref 136–145)
TOTAL PROTEIN: 7.7 GM/DL (ref 6.4–8.2)
WBC # BLD: 10.8 THOU/MM3 (ref 4.8–10.8)

## 2018-03-08 PROCEDURE — 83690 ASSAY OF LIPASE: CPT

## 2018-03-08 PROCEDURE — 96374 THER/PROPH/DIAG INJ IV PUSH: CPT

## 2018-03-08 PROCEDURE — 2580000003 HC RX 258: Performed by: FAMILY MEDICINE

## 2018-03-08 PROCEDURE — 6360000002 HC RX W HCPCS: Performed by: FAMILY MEDICINE

## 2018-03-08 PROCEDURE — 36415 COLL VENOUS BLD VENIPUNCTURE: CPT

## 2018-03-08 PROCEDURE — 96376 TX/PRO/DX INJ SAME DRUG ADON: CPT

## 2018-03-08 PROCEDURE — 82150 ASSAY OF AMYLASE: CPT

## 2018-03-08 PROCEDURE — 99283 EMERGENCY DEPT VISIT LOW MDM: CPT

## 2018-03-08 PROCEDURE — 96375 TX/PRO/DX INJ NEW DRUG ADDON: CPT

## 2018-03-08 PROCEDURE — 85027 COMPLETE CBC AUTOMATED: CPT

## 2018-03-08 PROCEDURE — 74177 CT ABD & PELVIS W/CONTRAST: CPT

## 2018-03-08 PROCEDURE — 80076 HEPATIC FUNCTION PANEL: CPT

## 2018-03-08 PROCEDURE — 80048 BASIC METABOLIC PNL TOTAL CA: CPT

## 2018-03-08 PROCEDURE — 6360000004 HC RX CONTRAST MEDICATION: Performed by: FAMILY MEDICINE

## 2018-03-08 RX ORDER — SCOLOPAMINE TRANSDERMAL SYSTEM 1 MG/1
1 PATCH, EXTENDED RELEASE TRANSDERMAL
Qty: 3 PATCH | Refills: 0 | Status: SHIPPED | OUTPATIENT
Start: 2018-03-08 | End: 2018-03-08 | Stop reason: SDUPTHER

## 2018-03-08 RX ORDER — ONDANSETRON 2 MG/ML
8 INJECTION INTRAMUSCULAR; INTRAVENOUS ONCE
Status: COMPLETED | OUTPATIENT
Start: 2018-03-08 | End: 2018-03-08

## 2018-03-08 RX ORDER — SCOLOPAMINE TRANSDERMAL SYSTEM 1 MG/1
1 PATCH, EXTENDED RELEASE TRANSDERMAL
Qty: 3 PATCH | Refills: 0 | Status: SHIPPED | OUTPATIENT
Start: 2018-03-08 | End: 2018-03-14 | Stop reason: SDUPTHER

## 2018-03-08 RX ORDER — ONDANSETRON 2 MG/ML
4 INJECTION INTRAMUSCULAR; INTRAVENOUS ONCE
Status: COMPLETED | OUTPATIENT
Start: 2018-03-08 | End: 2018-03-08

## 2018-03-08 RX ORDER — HALOPERIDOL 0.5 MG/1
0.5 TABLET ORAL 4 TIMES DAILY
Qty: 20 TABLET | Refills: 0 | Status: SHIPPED | OUTPATIENT
Start: 2018-03-08 | End: 2018-03-19

## 2018-03-08 RX ORDER — 0.9 % SODIUM CHLORIDE 0.9 %
1000 INTRAVENOUS SOLUTION INTRAVENOUS ONCE
Status: COMPLETED | OUTPATIENT
Start: 2018-03-08 | End: 2018-03-08

## 2018-03-08 RX ADMIN — IOPAMIDOL 100 ML: 755 INJECTION, SOLUTION INTRAVENOUS at 03:17

## 2018-03-08 RX ADMIN — ONDANSETRON 8 MG: 2 INJECTION INTRAMUSCULAR; INTRAVENOUS at 04:10

## 2018-03-08 RX ADMIN — ONDANSETRON 4 MG: 2 INJECTION INTRAMUSCULAR; INTRAVENOUS at 02:44

## 2018-03-08 RX ADMIN — ONDANSETRON 8 MG: 2 INJECTION INTRAMUSCULAR; INTRAVENOUS at 03:37

## 2018-03-08 RX ADMIN — HYDROMORPHONE HYDROCHLORIDE 1 MG: 1 INJECTION, SOLUTION INTRAMUSCULAR; INTRAVENOUS; SUBCUTANEOUS at 02:44

## 2018-03-08 RX ADMIN — SODIUM CHLORIDE 1000 ML: 9 INJECTION, SOLUTION INTRAVENOUS at 02:44

## 2018-03-08 RX ADMIN — HYDROMORPHONE HYDROCHLORIDE 1 MG: 1 INJECTION, SOLUTION INTRAMUSCULAR; INTRAVENOUS; SUBCUTANEOUS at 03:34

## 2018-03-08 ASSESSMENT — PAIN SCALES - GENERAL
PAINLEVEL_OUTOF10: 8
PAINLEVEL_OUTOF10: 7

## 2018-03-08 NOTE — DISCHARGE SUMMARY
CT chest abdomen and pelvis demonstrating no concerning findings. Tip of spleen expected finding after sleeve gastrectomy due to transection of short gastric vessels to mobilize fundus of stomach. SUBJECTIVE:   Chief Complaint: fevers     History of Present Illness:  John Orozco Was admitted last night secondary to postoperative fevers. She is postoperative day 5 status post robotic sleeve gastrectomy. She has had postoperative nausea and some emesis postoperatively. She thinks this is getting a little better. Had poor by mouth intake but again starting to improve. She states she was able to get 4060 milliliters and over the last 2436 hours. She received IV fluid hydration postoperatively as outpatient. Incisions healing well with no breakdown, bleeding or signs of infection. Passing flatus. Abdominal pain minimal.  She states she is taking very little pain medication anymore. Urinating spontaneously. Denies headache. No lightheadedness or dizziness. No change in vision. She was obtaining a outpatient upper GI yesterday which was normal but upon leaving family states she got lightheaded dizzy and felt like she was going to pass out.  states her lips were grayish/bluish. Because of all of this she went to the emergency department. Denies any chest pain. No new shortness of breath.   Has been able to ambulate okay postoperatively.     Past Medical History  Past Medical History             Diagnosis Date    Anxiety      Asthma      Chronic back pain      Depression      Diabetes mellitus (HCC)      GERD (gastroesophageal reflux disease)      Headache      Hypertension      Infertility, female      Thyroid disease      Ulcer ([address]      UTI (urinary tract infection)           Past Surgical History  Past Surgical History             Procedure Laterality Date    BREAST SURGERY Right 2016     biopsy    COLONOSCOPY   2015?     Dr. Dori Rivera    [place] LAP,STOMACH,OTHER,W/O TUBE N/A 2/12/2018   GASTRECTOMY SLEEVE LAPAROSCOPIC ROBOTIC performed by Dimas Dumont MD at [facility]   ?     Dr. Kiah Robb        Dr. Trip Moy         Medications  Home Medications           Prior to Admission medications    Medication Sig Start Date End Date Taking?  Authorizing Provider   levofloxacin (LEVAQUIN) 500 MG tablet Take 1 tablet by mouth daily for 10 days 2/15/18 2/25/18 Yes Leidy William MD   ketorolac (TORADOL) 10 MG tablet Take 1 tablet by mouth every 8 hours as needed for Pain 18   Yes Edith Rodriguez CNP   metoclopramide (REGLAN) 10 MG tablet Take 1 tablet by mouth every 6 hours as needed (nausea/vomiting) 18 Yes Dimas Dumont MD   enoxaparin (LOVENOX) 40 MG/0.4ML injection Inject 0.4 mLs into the skin daily for 14 days 18 Yes Dimas Dumont MD   Docusate Sodium 100 MG TABS Take 100 mg by mouth 2 times daily Take as needed to prevent constipation 18   Yes Dimas Dumont MD   omeprazole (PRILOSEC) 40 MG delayed release capsule Take 1 capsule by mouth daily Open capsule and take in liquid 18 Yes Dimas Dumont MD   ondansetron (ZOFRAN) 4 MG tablet Take 1 tablet by mouth every 4 hours as needed for Nausea or Vomiting 18 Yes Dimas Dumont MD   Prenatal Multivit-Min-Fe-FA (PRE- PO) Take 1 tablet by mouth daily     Yes Historical Provider, MD   levothyroxine (SYNTHROID) 125 MCG tablet Take 125 mcg by mouth every morning     Yes Historical Provider, MD   Melatonin 10 MG TABS Take 1 tablet by mouth nightly     Yes Historical Provider, MD   Flaxseed Linseed, (FLAXSEED OIL PO) Take 1,500 mg by mouth every morning     Yes Historical Provider, MD   Lysine 500 MG TABS Take 500 mg by mouth nightly     Yes Historical Provider, MD   Norgestimate-Eth Estradiol (SPRINTEC 28 PO) Take by mouth daily      Yes Historical Provider, MD   Calcium Carbonate-Vitamin D (OS-ADIN 500 + D PO) Take by mouth 2 times daily     Yes Historical Provider, MD   labetalol (NORMODYNE) 100 MG tablet 200 mg 2 times daily  1/29/17   Yes Historical Provider, MD   Albuterol Sulfate (PROVENTIL HFA IN) Inhale 2 puffs into the lungs every 4-6 hours as needed        Historical Provider, MD   EPINEPHrine HCl, Anaphylaxis, (EPIPEN IM) Inject into the muscle       Historical Provider, MD       Scheduled Meds:  Scheduled Medications    docusate sodium  100 mg Oral BID    levothyroxine  125 mcg Oral QAM    sodium chloride flush  10 mL Intravenous 2 times per day    enoxaparin  40 mg Subcutaneous Daily    ketorolac  15 mg Intravenous Q6H    pantoprazole  40 mg Intravenous Daily     And    sodium chloride (PF)  10 mL Intravenous Daily         Continuous Infusions:  Infusions Meds    sodium chloride 100 mL/hr at 02/23/18 1707    lactated ringers 125 mL/hr at 02/24/18 0350         PRN Meds:. PRN Medications   albuterol sulfate HFA, sodium chloride flush, acetaminophen, morphine **OR** morphine, ondansetron, metoclopramide, hyoscyamine, promethazine, promethazine     Allergies  is allergic to bee venom; bactrim [sulfamethoxazole-trimethoprim]; mirapex [pramipexole]; oxycodone; and requip [ropinirole]. Family History  family history includes Birth Defects in her mother; Diabetes in her father and maternal grandmother; Heart Disease in her maternal grandfather; High Blood Pressure in her brother and father; Obesity in her brother, father, and mother. Social History   reports that she quit smoking about 3 years ago. Her smoking use included Cigarettes. She smoked 0.50 packs per day. She has never used smokeless tobacco. She reports that she does not drink alcohol or use drugs. Review of Systems:  General  Fever chills and some sweats since OR. No significant unexpected weight change. HEENT Denies any diplopia, tinnitus or vertigo. No chronic headaches.   Resp Denies any shortness of breath, cough or wheezing  Cardiac Denies any chest pain, palpitations, claudication or edema  GI Denies any melena, hematochezia, hematemesis or pyrosis. Nausea and abdominal pain as mentioned above   Denies any frequency, urgency, hesitancy or incontinence  Heme Denies bruising or bleeding easily  Endocrine Denies any history of diabetes or thyroid disease  Neuro Denies any focal motor or sensory deficits  Musculoskeletal  Denies osteoarthritis. No gout. No weakness. Psychiatric  Denies any severe depression or agitation. No panic attacks. No suicidal ideation. OBJECTIVE:   CURRENT VITALS:  height is 5' 9\" (1.753 m) and weight is 320 lb (145.2 kg) (abnormal). Her temperature is 99.8 °F (37.7 °C). Her blood pressure is 132/61 and her pulse is 96. Her respiration is 18 and oxygen saturation is 92%. Temperature Range (24h):Temp: 99.8 °F (37.7 °C) Temp  Av.8 °F (38.2 °C)  Min: 99.8 °F (37.7 °C)  Max: 102.4 °F (39.1 °C)  BP Range (14O): Systolic (00ACC), CMQ:697 , Min:114 , UED:275     Diastolic (16QLG), DBL:62, Min:46, Max:80     Pulse Range (24h): Pulse  Av.7  Min: 80  Max: 104  Respiration Range (24h): Resp  Av  Min: 18  Max: 24  Current Pulse Ox (24h):  SpO2: 92 %  Pulse Ox Range (24h):  SpO2  Av.6 %  Min: 92 %  Max: 98 %  Oxygen Amount and Delivery:    CONSTITUTIONAL: Alert and oriented times 3, no acute distress and cooperative to examination with proper mood and affect. SKIN: Skin color, texture, turgor normal. No rashes or lesions. LYMPH: no cervical nodes, no inguinal nodes  HEENT: Head is normocephalic, atraumatic. EOMI, PERRLA. NECK: Supple, symmetrical, trachea midline, no adenopathy, thyroid symmetric, not enlarged and no tenderness, skin normal.  CHEST/LUNGS: chest symmetric with normal A/P diameter, normal respiratory rate and rhythm, lungs clear to auscultation without wheezes, rales or rhonchi. No accessory muscle use.  Scars None   CARDIOVASCULAR: Heart sounds are normal.  Regular There is no stenosis or gastroesophageal reflux in the distal esophagus. No large hiatal hernia is identified. There are surgical changes in the stomach of prior sleeve gastrectomy. The gastric contour is stable compared to the prior study. There is    normal transit of contrast through the stomach and into the duodenum. There is no extravasation of contrast to suggest a leak. The duodenal sweep is normal.           Impression       No evidence of obstruction or leak.      Narrative   PROCEDURE: XR CHEST (2 VW)       CLINICAL INFORMATION: Cough and fever       TECHNIQUE: PA and lateral chest radiographs.       COMPARISON: [facility] chest radiograph 2/21/2018       FINDINGS: Cardiomediastinal silhouette is within normal limits. Lungs are clear. Mild endplate changes are present in the thoracic spine. There is contrast from same-day upper GI examination in the upper abdomen. .           Impression       No acute intrathoracic process.      Narrative   PROCEDURE: CTA CHEST W WO CONTRAST, CT ABDOMEN PELVIS W IV CONTRAST       CLINICAL INFORMATION: CHEST PAIN, ACUTE, PULMONARY EMBOLISM SUSPECTED, .       COMPARISON: No prior study.       TECHNIQUE: 1.5 mm axial images were obtained through the chest, abdomen and pelvis after the administration of IV contrast.  A non-contrast localizer was obtained.  3D reconstructions were performed on the scanner to include oblique coronal MIP images    through both the right and left pulmonary arteries. was the intravenous contrast utilized.       All CT scans at this facility use dose modulation, iterative reconstruction, and/or weight-based dosing when appropriate to reduce radiation dose to as low as reasonably achievable.       FINDINGS:         CT chest       There is adequate opacification of the pulmonary arterial system.  No pulmonary emboli are present.  The aorta is within acceptable limits.       There is a pleural-based parenchymal opacity with air bronchograms beginning in easily to be broken down. Because of this, I  elected to hold on any further exploration because I was fearful that if  there was a leak as when I simply open up that area to the intraabdominal  cavity as currently the omentum appears to be buttressing it by omental  patch. There was no contamination on the outside at all. At that time, I  elected to place a #19 round Steve drain in this region. Some Dusty was  placed to ensure hemostasis. After irrigation was used and irrigant return  was clear and hemostasis appeared to be adequate. The #19 round Arti Whitney  drain was attached to the skin with silk suture. This was actually brought  through the most lateral 8 mm trocar site. Instruments at that time were  removed and I scrubbed back into the table. The robot was undocked. Other  8 mm trocars and 5 mm trocars were all taken out after Liver retractor was  removed under direct vision. The patient tolerated the desufflation well. Hemostasis adequate. Skin was reapproximated at all the incisional sites  with 4-0 Vicryl in a subcuticular fashion. Closed incisions were then  cleaned, dried, and Steri-Strips applied. Dry sterile dressings were  applied. Local anesthetic was used and the patient tolerated this well  with no apparent complications. At that time,  _____ came in and  proceeded with upper endoscopy. At the very most proximal aspect of the  sleeve there on the left/lateral side there was a defect that appeared to  be breakdown on the staple line. The rest of the sleeve overall looked  really good and well healed. The defect was probably about 2 cm in  diameter. He felt that she would be a good candidate for stent placement  as he was able to actually get it scoped through that defect and suction  out the small area of concern. It appeared to be otherwise completely  walled off by omentum. The stent was then deployed which was a 18 mm 12 cm  stent.   The patient tolerated this well and what retroflexed in the fundus. Findings and maneuvers are listed in impression below. The patient tolerated the procedure well. The scope was removed. There were no immediate complications.     IMPRESSION:  1. Esophagus - normal   2. Stomach - s/p gastric sleeve with large, 2 cm, perforation in distal esophagus with visible staples. The defect is traversed and any debris suctioned. There is a large walled off area. I cannot enter the rest of the peritoneum. 3.  Successful placement of 18 mm x 12 cm fully coated esophageal stent over the GEJ in to the stomach using fluoro. 4.  Duodenum - normal      RECOMMENDATIONS:    1. Liquid diet per Dr Harleen More. Do not advance. 2.  Weekly CXR to confirm placement. 3.  Call if development of trouble swallowing, pain, or nausea. 4.  Repeat EGD with propofol to remove stent in 4 weeks. 5.  Bid ppi while stent in place.  Milla Corrales MD  3:06 PM 3/2/2018     Hospital Course: Cornelio Seip is a 25year old patient who was admitted to [place] [address] on 2/24/18 for postoperative fevers. She is status post robotic assisted sleeve gastrectomy on 2/12/18. CT imaging did not reveal any concerning findings and Upper GI did not reveal evidence of leak. Due to the fevers ID was consulted and placed on appropriate antibiotics. Although abdomen seemed benign, fevers did not improve and was not able to tolerate much by mouth. She was taken to the OR per Dr. Harleen More for a robotic exploration with washout and drain placement was performed on 3/2/18. GI was also consulted and Dr. Kajal Sánchez placed an esophogeal stent. She very slowly was advanced to a clear liquid diet again and was allowed to take medications orally. She was also on TPN through the hospital stay. This was wean as she was able to take more by mouth. Fevers then resolved. She was ability to tolerate activity better and pain under control with liquid pain medication. She was able to void on her own and bowel function returned. She was tolerating clear liquids with protein shakes prior to discharge. DOYLE drain with minimal serous drainage, but left in place for 1 week. Discharge Condition: stable    Disposition: Home with mother    Labs:  Lab Results   Component Value Date    WBC 7.4 03/04/2018    HGB 12.4 03/04/2018    HCT 36.8 (L) 03/04/2018    MCV 91.5 03/04/2018     03/04/2018     Lab Results   Component Value Date     03/04/2018    K 4.1 03/04/2018    K 3.7 02/24/2018     03/04/2018    CO2 26 03/04/2018    BUN 10 03/04/2018    CREATININE 0.7 03/04/2018    GLUCOSE 135 03/04/2018    CALCIUM 8.9 03/04/2018     Discharge Instructions:  Pt Name: Nicole Coker  Medical Record Number: [id number]  Today's Date: 3/6/2018    ACTIVITY INSTRUCTIONS:  [] Rest today. Resume light to normal activity tomorrow.   [] You may resume normal activity tomorrow. Do not engage in strenuous activity that may place stress on your incision. [x] Do not drive for 3-5 days or while taking pain medication. Avoid heavy lifting, tugging, pullings greater than 10 lbs until seen in the office. DIET INSTRUCTIONS:  [x]Other: Sugar-free clear liquid diet. Continue two protein shakes a day. If you are not getting in your 64 ounces please call office right away. DO NOT ADVANCE DIET UNTIL INSTRUCTED BY PHYSICIAN. MEDICATIONS  [x]Prescription sent with you to be used as directed. []Percocet   []Vicodin   [x]Hycet   []Tylenol #3   []Oxycontin  Do not drink alcohol or drive while taking these medications. You may experience dizziness or drowsiness with these medications. You may also experience constipation which can be relieved with stool softners or laxatives. [x]You may resume your daily prescription medication schedule unless otherwise specified. Take the Zofran exactly as prescribed to control nausea and vomiting. Used a scopolamine patch exactly as prescribed to control nausea and vomiting.     Use the Phenergan suppositories

## 2018-03-08 NOTE — ED PROVIDER NOTES
(TRANSDERM-SCOP, 1.5 MG,) transdermal patch Place 1 patch onto the skin every 72 hours 3 patch 0    FLUoxetine (PROZAC) 20 MG/5ML solution Take 5 mLs by mouth daily 150 mL 1    promethazine (PROMETHEGAN) 25 MG suppository Place 1 suppository rectally every 6 hours as needed for Nausea 10 suppository 1    HYDROcodone-acetaminophen (HYCET) 7.5-325 MG per 15ML solution Take 5-10 mLs by mouth every 4 hours as needed for Pain for up to 7 days. 200 mL 0    amoxicillin-clavulanate (AUGMENTIN) 250-62.5 MG/5ML suspension Called into pharmacy where they have Augmentin 400 mg suspension. She will take 10 mls BID for 5 days.  100 mL 0    lactulose (CHRONULAC) 10 GM/15ML solution Take 30 mLs by mouth daily as needed (constipation) 900 mL 1    omeprazole (PRILOSEC) 40 MG delayed release capsule Take 1 capsule by mouth 2 times daily (before meals) Open capsule and take in liquid 60 capsule 1    Prenatal Multivit-Min-Fe-FA (PRE-CHANTEL PO) Take 1 tablet by mouth daily      levothyroxine (SYNTHROID) 125 MCG tablet Take 125 mcg by mouth every morning      Melatonin 10 MG TABS Take 1 tablet by mouth nightly      Norgestimate-Eth Estradiol (SPRINTEC 28 PO) Take by mouth daily       Calcium Carbonate-Vitamin D (OS-ADIN 500 + D PO) Take by mouth 2 times daily      Albuterol Sulfate (PROVENTIL HFA IN) Inhale 2 puffs into the lungs every 4-6 hours as needed       EPINEPHrine HCl, Anaphylaxis, (EPIPEN IM) Inject into the muscle      ondansetron (ZOFRAN ODT) 4 MG disintegrating tablet Take 1 tablet by mouth every 6 hours as needed for Nausea or Vomiting 40 tablet 0       ALLERGIES    Allergies   Allergen Reactions    Bee Venom Swelling    Bactrim [Sulfamethoxazole-Trimethoprim] Hives    Mirapex [Pramipexole] Other (See Comments)     Leg cramps    Oxycodone Rash    Requip [Ropinirole] Nausea And Vomiting       FAMILY HISTORY    Family History   Problem Relation Age of Onset    Birth Defects Mother     Obesity Mother    [facility] Diabetes Father     High Blood Pressure Father     Obesity Father     High Blood Pressure Brother     Obesity Brother     Diabetes Maternal Grandmother     Heart Disease Maternal Grandfather        SOCIAL HISTORY    Social History     Social History    Marital status: Single     Spouse name: N/A    Number of children: N/A    Years of education: 15     Occupational 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[employer]     Social History Main Topics    Smoking status: Former Smoker     Packs/day: 0.50     Types: Cigarettes     Quit date: 5/18/2014    Smokeless tobacco: Never Used    Alcohol use No    Drug use: No    Sexual activity: Yes     Partners: Male     Other Topics Concern    None     Social History Narrative    None       PHYSICAL EXAM    VITAL SIGNS: BP (!) 141/74   Pulse 94   Temp 97.6 °F (36.4 °C) (Oral)   Resp 18   Ht 5' 8\" (1.727 m)   Wt (!) 320 lb (145.2 kg)   LMP 02/14/2018 (Approximate)   SpO2 99%   BMI 48.66 kg/m²   Constitutional:  Well developed, well nourished, no acute distress, non-toxic appearance   Eyes:  conjunctiva normal   HENT:  Atraumatic, external ears normal, nose normal, oropharynx moist. Neck supple   Respiratory:  No respiratory distress, normal breath sounds   Cardiovascular:  Normal rate, normal rhythm, no murmurs, no gallops, no rubs   GI:  Obese soft nontender nondistended no rebound or guarding   Musculoskeletal:  No edema   Integument:  Well hydrated   Neurologic:  Alert & oriented x 3, no focal deficits       RADIOLOGY/PROCEDURES    Xr Chest Standard (2 Vw)    Result Date: 2/23/2018  PROCEDURE: XR CHEST (2 VW) CLINICAL INFORMATION: Cough and fever TECHNIQUE: PA and lateral chest radiographs. COMPARISON: [facility] chest radiograph 2/21/2018 FINDINGS: Cardiomediastinal silhouette is within normal limits. Lungs are clear. Mild endplate changes are present in the thoracic spine. There is contrast from same-day upper GI examination in the upper abdomen. .     No acute lymphadenopathy, or mass in the abdomen or pelvis. Postoperative changes to the stomach are noted consistent with the recent surgery of gastric sleeve. The stomach otherwise appears normal. The small bowel appears normal. The colon demonstrates moderate diverticular change of the descending colon. The colon otherwise appears normal and contains radiodense oral contrast. The uterus and right ovary are normal. The left ovary is normal but contains a 2.5 x 3 cm homogeneous water density cyst. The bones of the pelvis are within normal limits. 1.  The contrast filled pulmonary arteries are clear of filling defect. No acute pulmonary arterial embolus is detected. 2.  Triangle shaped opacity in the posterior lateral left lung base likely represents atelectasis but could represent changes of early pneumonia. 3.  Small subcapsular hematoma of the medial aspect of the spleen is favored significantly more than an infarct due to the shape of the area of hypodensity. 4.  The nonspecific stranding between the stomach and spleen is probably postoperative change from the recent gastric sleeve procedure. 5.  No free fluid. No abscess. No free air. Findings were discussed with Dr. Albert Ruano by Dr. Baron Pierce at 10:10 PM on 2/15/2018. **This report has been created using voice recognition software. It may contain minor errors which are inherent in voice recognition technology. ** Final report electronically signed by Dr. Kieran Osuna on 2/15/2018 10:10 PM    Desire Chin Cva Device Placement    Result Date: 3/2/2018  PICC LINE INSERTION WITH ULTRASOUND AND FLUOROSCOPIC GUIDANCE: CLINICAL INFORMATION: Abscess/perforation following gastric sleeve operation PERFORMED BY: Kathy Burdick M.D. APPROACH:  Right Internal Jugular Vein, ultrasound guidance, micropuncture technique.  CATHETER: 5 [place] double-lumen power PICC CATHETER LENGTH: 20 CM CATHETER TIP:  Right [facility] FLUOROSCOPY TIME: 0.9 minutes FLUOROSCOPIC IMAGES: 1 PROCEDURE: report electronically signed by Dr. Paty Garrison on 3/2/2018 10:34 AM    Xr Chest Portable    Result Date: 2/21/2018  PROCEDURE: XR CHEST PORTABLE CLINICAL INFORMATION: Fever. Fatigue. COMPARISON: 1/17/2018 TECHNIQUE: A single mobile view of the chest was obtained. Normal mobile chest. **This report has been created using voice recognition software. It may contain minor errors which are inherent in voice recognition technology. ** Final report electronically signed by Dr. Padmini Butt on 2/21/2018 2:53 PM    Xr Chest 1 Vw    Result Date: 3/2/2018  PROCEDURE: XR CHEST 1 VIEW CLINICAL INFORMATION: confirm stent placement,  . COMPARISON: No prior study. TECHNIQUE: Single image from endoscopy Center. FINDINGS: Single image demonstrates a esophageal stent. Exact positioning is indeterminate based on this but does appear to span the region of the lower esophageal sphincter into the stomach. Limited operative image. **This report has been created using voice recognition software. It may contain minor errors which are inherent in voice recognition technology. ** Final report electronically signed by Dr. Paty Garrison on 3/2/2018 3:21 PM    Fluoro For Surgical Procedures    Result Date: 3/2/2018  Radiology exam is complete. No Radiologist dictation. Please follow up with ordering provider. Fl Ugi    Result Date: 2/23/2018  PROCEDURE: FL UGI CLINICAL INFORMATION: Nausea, vomiting, recent gastric surgery. TECHNIQUE: Preliminary images of the abdomen were obtained. Following the oral ingestion of dilute Gastrografin and thin barium, the anatomy of the distal esophagus, stomach and duodenum were evaluated in a limited upper GI examination. A total of 19 spot images were obtained. Total fluoroscopy time 1.0 minutes. COMPARISON: Upper GI examination 2/13/2018 FINDINGS: Preliminary image demonstrates a nonobstructive bowel gas pattern. Surgical suture material is present in the left upper quadrant of the abdomen.  Osseous or further leak or abscess. Her abdomen CTs did not show anything acute per radiologist.  CBC had a normal white count electrolytes were essentially normal amylase lipase is normal recheck her abdomen she sits very comfortably. Appears to have just postoperative pain and not pain for many obstruction I discussed with her her need for close follow-up    FINAL IMPRESSION    Nausea vomiting  Status post abdominal surgery    Plan  Follow-up with Dr. Michelet Degroot in one to 2 days. Scopolamine patch for the nausea vomiting. Continue postoperative orders of medication.     Return sooner for any Problems         Saida Willoughby MD  03/08/18 [id number]

## 2018-03-08 NOTE — ED TRIAGE NOTES
Pt states she was released from [address] [place] [address] 2 days prior to arrival. Now she has abdominal pain and has been vomiting continuously for the last 24 hours.

## 2018-03-09 NOTE — TELEPHONE ENCOUNTER
Received approval of the scopolamine 1.5mg patch via fax today at 10:04 a.m. Called [facility] to give them the PA # and let them know of approval.    Called patient's phone # and left VM that it was approved and that I let [facility] know.   Told pt they should be filling it and to contact them if she doesn't hear from them first.

## 2018-03-12 ENCOUNTER — HOSPITAL ENCOUNTER (OUTPATIENT)
Dept: GENERAL RADIOLOGY | Age: 28
Discharge: HOME | End: 2018-03-12
Payer: COMMERCIAL

## 2018-03-12 ENCOUNTER — OFFICE VISIT (OUTPATIENT)
Dept: SURGERY | Age: 28
End: 2018-03-12

## 2018-03-12 ENCOUNTER — HOSPITAL ENCOUNTER (OUTPATIENT)
Dept: NURSING | Age: 28
Discharge: HOME | End: 2018-03-12
Payer: COMMERCIAL

## 2018-03-12 ENCOUNTER — HOSPITAL ENCOUNTER (OUTPATIENT)
Age: 28
Discharge: HOME | End: 2018-03-12
Payer: COMMERCIAL

## 2018-03-12 VITALS
TEMPERATURE: 96.7 F | OXYGEN SATURATION: 97 % | HEART RATE: 129 BPM | BODY MASS INDEX: 43.4 KG/M2 | HEIGHT: 69 IN | DIASTOLIC BLOOD PRESSURE: 74 MMHG | WEIGHT: 293 LBS | RESPIRATION RATE: 18 BRPM | SYSTOLIC BLOOD PRESSURE: 124 MMHG

## 2018-03-12 VITALS
TEMPERATURE: 98 F | DIASTOLIC BLOOD PRESSURE: 72 MMHG | OXYGEN SATURATION: 96 % | RESPIRATION RATE: 15 BRPM | SYSTOLIC BLOOD PRESSURE: 138 MMHG | HEART RATE: 75 BPM

## 2018-03-12 DIAGNOSIS — Z48.03 CHANGE OR REMOVAL OF DRAINS: ICD-10-CM

## 2018-03-12 DIAGNOSIS — Z98.84 S/P BARIATRIC SURGERY: ICD-10-CM

## 2018-03-12 DIAGNOSIS — Z98.84 STATUS POST BARIATRIC SURGERY: ICD-10-CM

## 2018-03-12 DIAGNOSIS — Z09 S/P ABDOMINAL SURGERY, FOLLOW-UP EXAM: Primary | ICD-10-CM

## 2018-03-12 DIAGNOSIS — E86.0 DEHYDRATION: ICD-10-CM

## 2018-03-12 PROCEDURE — 96361 HYDRATE IV INFUSION ADD-ON: CPT

## 2018-03-12 PROCEDURE — 2580000003 HC RX 258: Performed by: PHYSICIAN ASSISTANT

## 2018-03-12 PROCEDURE — 71046 X-RAY EXAM CHEST 2 VIEWS: CPT

## 2018-03-12 PROCEDURE — 96360 HYDRATION IV INFUSION INIT: CPT

## 2018-03-12 PROCEDURE — 99024 POSTOP FOLLOW-UP VISIT: CPT | Performed by: NURSE PRACTITIONER

## 2018-03-12 RX ORDER — SODIUM CHLORIDE 9 MG/ML
INJECTION, SOLUTION INTRAVENOUS CONTINUOUS
Status: CANCELLED
Start: 2018-03-12

## 2018-03-12 RX ORDER — SODIUM CHLORIDE 9 MG/ML
2000 INJECTION, SOLUTION INTRAVENOUS CONTINUOUS
Status: ACTIVE | OUTPATIENT
Start: 2018-03-12 | End: 2018-03-12

## 2018-03-12 RX ORDER — SODIUM CHLORIDE 9 MG/ML
INJECTION, SOLUTION INTRAVENOUS CONTINUOUS
Status: DISCONTINUED | OUTPATIENT
Start: 2018-03-12 | End: 2018-03-15 | Stop reason: HOSPADM

## 2018-03-12 RX ADMIN — SODIUM CHLORIDE 2000 ML: 9 INJECTION, SOLUTION INTRAVENOUS at 14:53

## 2018-03-12 ASSESSMENT — ENCOUNTER SYMPTOMS
VOICE CHANGE: 0
EYE ITCHING: 0
ABDOMINAL PAIN: 1
BLOOD IN STOOL: 0
SHORTNESS OF BREATH: 0
COUGH: 0
RHINORRHEA: 0
CHOKING: 0
RECTAL PAIN: 0
CONSTIPATION: 0
APNEA: 0
EYE REDNESS: 0
NAUSEA: 1
STRIDOR: 0
SORE THROAT: 0
TROUBLE SWALLOWING: 0
COLOR CHANGE: 0
BACK PAIN: 0
FACIAL SWELLING: 0
ANAL BLEEDING: 0
WHEEZING: 0
CHEST TIGHTNESS: 0
EYE DISCHARGE: 0
SINUS PRESSURE: 0
EYE PAIN: 0
PHOTOPHOBIA: 0

## 2018-03-12 NOTE — PLAN OF CARE
Problem: Nutrition  Goal: Optimal nutrition therapy  Outcome: Met This Shift  Instructed pt to take small sips of water/fluids at a time. To try to eat small amt bland/soft foods. Pt voiced understanding.

## 2018-03-12 NOTE — PROGRESS NOTES
[facility]  [facility]  [address] [address]  [address]  Dept: [phone]  Dept Fax: [phone]  Loc: [phone]      Visit Date:  3/12/2018  Weight Management Postop Follow-up         Amado Fonseca is a 25 y.o. female who is 1 month s/p robotic sleeve gastrectomy by Dr. Leobardo Vale. She had a complication of a leak and is currently 10 days s/p drain and stent placement. She is being seen today by Agustina De Oliveira CNP, for drain removal. I stopped up to see Sammy Stovall during this visit to discuss fluid and protein intake. Her finance has been tracking protein and fluids. The last 2 days she has not gotten in more than 32 oz of fluid. She has only taken in 8-18 grams of protein over the last 2 days. Having difficulty tolerating protein. Has tried several options. Feeling dehydrated and nauseated today. Has had less nausea since starting the Scopolamine patch. Has been sleeping 11-12 hours each night and per records starting to drink fluids around 1130 am and stopping around 7 pm.  C/o fatigue. Plan:    Stay well hydrated. Drink a minimum of 64 oz of non-carbonated, non-caffeinated fluids daily. Advised to set an alarm to wake up and start fluids- Do not sleep more than 8 hours  IV fluids ordered- 2 liters- Will complete at [facility] at 2pm today per patient request  Nutritional education occurred during visit. Follow  recommendations as directed. Minimum of 60 grams daily. Multiple samples of protein given. Advised to add unflavored protein powder, as needed. Try Muscle Milk- handout given- as she has been able to tolerate milk. No soft or solid foods. Liquids only. Signs and symptoms reviewed with patient that would be concerning and need her to return to office for re-evaluation. Patient will call if any questions or concerns arrise.   Advised to call daily ans report water and protein intake  No lifting, pushing, pulling over 20#  No abdominal exercises  Wear abdominal binder prn  Complete Lovenox as rx  Importance of physical activity discussed with patient. Advised to increase activity as tolerated. Continue taking Multivitamin, Calcium and B12 as directed  Continue PPI for at least 3 months post-op. Encouraged to attend support groups  Follow-up in 1 week with provider and dietitian  If unable to increase fluid and protein intake will consider TPN.     Electronically signed by NYDIA Pfeiffer on 3/12/2018 at 4:03 PM

## 2018-03-12 NOTE — PROGRESS NOTES
No adverse reaction noted from hydration therapy. Respirations regular and easy. Denies pain. Released in satisfactory condition. Ambulates out. Gait steady.

## 2018-03-12 NOTE — PROGRESS NOTES
Pt presents amb per self for 2 liters  IV saline. Pt pink, warm, dry. States she had gastric sleeve surgery 1 month ago and is still nauseated. Not taking many fluids. Also has some diarrhea.

## 2018-03-12 NOTE — PROGRESS NOTES
[facility]ESSIONAL [facility]S  [facility]  [address]  [address] [address]  Dept: [phone]  Dept Fax: [phone]  Loc: [phone]    Visit Date: 3/12/2018    Prabhu Allen is a 25 y.o. female who presents today for:  Chief Complaint   Patient presents with    Post-Op Check     s/p Robotic exploration with wash out and drain placement 3/2/18--drain removal       HPI:     HPI    Jackeline Del Valle is a 25year-old female who presents today for DOYLE drain removal. She is status post robotic sleeve gastrectomy on 5/67/81 with a complicated gastric leak. She is status post robotic exploration with wash out and drain placement on 3/2/18. She also had an esophogeal stent placed per Dr. Shira Gordon. She needs weekly chest x-rays to confirm placement of stent. Patient has been struggling to keep her fluids and protein down. NYDIA Cifuentes from weight management also saw patient today to discuss other protein options. She is taking Haldol, Reglan, Zofran orally and using scope patches for nausea. Lisa is at home to help and keeping track of her I&Os. She has been sleeping over 12 hours at night and therefore is not up long enough during the day to get her liquids in. She complains of the protein flavors being too sweet. She is tolerating some water and milk. Appetite is poor. She is spitting up after trying to drink the protein shakes. No vomiting. No fevers or chills. Bowel function normal. She feels extremely weak and tired. She is likely dehydrated although lab work from ED visit on 3/8/18 were normal. No issues urinating. No SOB or chest pain. Abdominal incisions are tender, but no real pain. Incisions are without signs of infection. DOYLE drain site to left abdomen is without infection.  She has only had out 2 cc serous drainage since leaving the hospital.     Past Medical History:   Diagnosis Date    Anxiety     Asthma     Chronic back pain     Depression     Diabetes mellitus ([address]     suppository 1    HYDROcodone-acetaminophen (HYCET) 7.5-325 MG per 15ML solution Take 5-10 mLs by mouth every 4 hours as needed for Pain for up to 7 days. 200 mL 0    lactulose (CHRONULAC) 10 GM/15ML solution Take 30 mLs by mouth daily as needed (constipation) 900 mL 1    omeprazole (PRILOSEC) 40 MG delayed release capsule Take 1 capsule by mouth 2 times daily (before meals) Open capsule and take in liquid 60 capsule 1    Prenatal Multivit-Min-Fe-FA (PRE- PO) Take 1 tablet by mouth daily      levothyroxine (SYNTHROID) 125 MCG tablet Take 125 mcg by mouth every morning      Melatonin 10 MG TABS Take 1 tablet by mouth nightly      Norgestimate-Eth Estradiol (SPRINTEC 28 PO) Take by mouth daily       Calcium Carbonate-Vitamin D (OS-ADIN 500 + D PO) Take by mouth 2 times daily      Albuterol Sulfate (PROVENTIL HFA IN) Inhale 2 puffs into the lungs every 4-6 hours as needed       EPINEPHrine HCl, Anaphylaxis, (EPIPEN IM) Inject into the muscle       No current facility-administered medications for this visit. Allergies   Allergen Reactions    Bee Venom Swelling    Bactrim [Sulfamethoxazole-Trimethoprim] Hives    Mirapex [Pramipexole] Other (See Comments)     Leg cramps    Oxycodone Rash    Requip [Ropinirole] Nausea And Vomiting       Subjective:      Review of Systems   Constitutional: Positive for activity change and appetite change. Negative for chills, diaphoresis, fatigue, fever and unexpected weight change. HENT: Negative for congestion, dental problem, drooling, ear discharge, ear pain, facial swelling, hearing loss, mouth sores, nosebleeds, postnasal drip, rhinorrhea, sinus pressure, sneezing, sore throat, tinnitus, trouble swallowing and voice change. Eyes: Negative for photophobia, pain, discharge, redness, itching and visual disturbance. Respiratory: Negative for apnea, cough, choking, chest tightness, shortness of breath, wheezing and stridor.     Cardiovascular: Negative for 7. Stool softeners as needed  8. Pain control   9. Activity/lifting restrictions discussed. Questions answered. 10. Follow up in 1 week in weight management. Signs and symptoms reviewed with patient that would be concerning and need her to return to office for re-evaluation. Patient states she will call if she has questions or concerns. 11. Appointment in 3 weeks with Dr. Marcelo Bowen for esophogeal stent placement  12.  Follow up with PCP as directed       Electronically signed by Salome Hankins CNP on 3/13/2018 at 8:18 PM

## 2018-03-13 ASSESSMENT — ENCOUNTER SYMPTOMS
DIARRHEA: 0
ABDOMINAL DISTENTION: 0
VOMITING: 1

## 2018-03-14 ENCOUNTER — TELEPHONE (OUTPATIENT)
Dept: BARIATRICS/WEIGHT MGMT | Age: 28
End: 2018-03-14

## 2018-03-14 DIAGNOSIS — Z98.890 PONV (POSTOPERATIVE NAUSEA AND VOMITING): Primary | ICD-10-CM

## 2018-03-14 DIAGNOSIS — Z98.84 S/P LAPAROSCOPIC SLEEVE GASTRECTOMY: ICD-10-CM

## 2018-03-14 DIAGNOSIS — R11.2 PONV (POSTOPERATIVE NAUSEA AND VOMITING): Primary | ICD-10-CM

## 2018-03-14 RX ORDER — PROMETHAZINE HYDROCHLORIDE 25 MG/1
25 SUPPOSITORY RECTAL EVERY 6 HOURS PRN
Qty: 10 SUPPOSITORY | Refills: 1 | Status: SHIPPED | OUTPATIENT
Start: 2018-03-14 | End: 2018-03-21

## 2018-03-14 RX ORDER — SCOLOPAMINE TRANSDERMAL SYSTEM 1 MG/1
1 PATCH, EXTENDED RELEASE TRANSDERMAL
Qty: 3 PATCH | Refills: 0 | Status: SHIPPED | OUTPATIENT
Start: 2018-03-14 | End: 2018-03-29 | Stop reason: SDUPTHER

## 2018-03-15 ENCOUNTER — TELEPHONE (OUTPATIENT)
Dept: BARIATRICS/WEIGHT MGMT | Age: 28
End: 2018-03-15

## 2018-03-16 ENCOUNTER — TELEPHONE (OUTPATIENT)
Dept: BARIATRICS/WEIGHT MGMT | Age: 28
End: 2018-03-16

## 2018-03-16 NOTE — TELEPHONE ENCOUNTER
Called patient-we cannot refill Scopolamine patch- until 3/18/218 -insurance wont' approve refill. You had 3 patches -apply one every 72 hours. Patient c/o nausea, \"I've vomited 2 times on 3- and twice 2 today- fluid intake on 3-15 is 55 ounces and 15 ounces today   -I am in [place] with my fiancee today. \"Are you taking Haldol? Is that the same as zofran?-no-it's not. My mom called Dr. Fatoumata Villa and she told me not to take that as its a psych meds and I threw it away. I explained that we do prescribe Haldol in lose dose an a antinausea med. \"   Do you feel dehydrated today - a little\"  I will see if we can order IV fluids for March 17 at [facility] call her back.

## 2018-03-17 ENCOUNTER — HOSPITAL ENCOUNTER (OUTPATIENT)
Dept: GENERAL RADIOLOGY | Age: 28
Discharge: HOME | End: 2018-03-17
Payer: COMMERCIAL

## 2018-03-17 VITALS
RESPIRATION RATE: 18 BRPM | SYSTOLIC BLOOD PRESSURE: 146 MMHG | HEIGHT: 69 IN | BODY MASS INDEX: 43.4 KG/M2 | OXYGEN SATURATION: 99 % | DIASTOLIC BLOOD PRESSURE: 88 MMHG | HEART RATE: 88 BPM | TEMPERATURE: 98.1 F | WEIGHT: 293 LBS

## 2018-03-17 PROCEDURE — 96361 HYDRATE IV INFUSION ADD-ON: CPT

## 2018-03-17 PROCEDURE — 96360 HYDRATION IV INFUSION INIT: CPT

## 2018-03-17 PROCEDURE — 2580000003 HC RX 258: Performed by: SURGERY

## 2018-03-17 RX ORDER — SODIUM CHLORIDE 9 MG/ML
INJECTION, SOLUTION INTRAVENOUS CONTINUOUS
Status: ACTIVE | OUTPATIENT
Start: 2018-03-17 | End: 2018-03-17

## 2018-03-17 RX ADMIN — SODIUM CHLORIDE: 9 INJECTION, SOLUTION INTRAVENOUS at 16:17

## 2018-03-17 RX ADMIN — SODIUM CHLORIDE: 9 INJECTION, SOLUTION INTRAVENOUS at 15:14

## 2018-03-17 NOTE — PLAN OF CARE
Problem: Discharge Planning:  Goal: Patients continuum of care needs are met  Patients continuum of care needs are met  Outcome: Ongoing  Patient encouraged to take oral fluids on a regular basis.

## 2018-03-17 NOTE — PROGRESS NOTES
Presents ambulatory for infusion of IV fluids for dehydration. Patient alert and cooperative. Skin warm and dry, color normal for ethnicity.

## 2018-03-19 ENCOUNTER — OFFICE VISIT (OUTPATIENT)
Dept: BARIATRICS/WEIGHT MGMT | Age: 28
End: 2018-03-19

## 2018-03-19 ENCOUNTER — HOSPITAL ENCOUNTER (OUTPATIENT)
Age: 28
Discharge: HOME | End: 2018-03-19
Payer: COMMERCIAL

## 2018-03-19 ENCOUNTER — HOSPITAL ENCOUNTER (OUTPATIENT)
Dept: GENERAL RADIOLOGY | Age: 28
Discharge: HOME | End: 2018-03-19
Payer: COMMERCIAL

## 2018-03-19 VITALS
TEMPERATURE: 99.1 F | DIASTOLIC BLOOD PRESSURE: 86 MMHG | BODY MASS INDEX: 43.4 KG/M2 | RESPIRATION RATE: 16 BRPM | WEIGHT: 293 LBS | SYSTOLIC BLOOD PRESSURE: 122 MMHG | HEART RATE: 100 BPM | HEIGHT: 69 IN

## 2018-03-19 DIAGNOSIS — Z09 S/P ABDOMINAL SURGERY, FOLLOW-UP EXAM: ICD-10-CM

## 2018-03-19 DIAGNOSIS — Z98.84 S/P LAPAROSCOPIC SLEEVE GASTRECTOMY: ICD-10-CM

## 2018-03-19 DIAGNOSIS — K91.89 LEAK OF ANASTOMOSIS BETWEEN GASTROINTESTINAL STRUCTURES: ICD-10-CM

## 2018-03-19 DIAGNOSIS — E66.01 MORBID OBESITY WITH BMI OF 40.0-44.9, ADULT (HCC): Primary | ICD-10-CM

## 2018-03-19 DIAGNOSIS — Z13.21 SCREENING FOR MALNUTRITION: ICD-10-CM

## 2018-03-19 DIAGNOSIS — K91.2 POSTSURGICAL MALABSORPTION: ICD-10-CM

## 2018-03-19 PROCEDURE — 99024 POSTOP FOLLOW-UP VISIT: CPT | Performed by: PHYSICIAN ASSISTANT

## 2018-03-19 PROCEDURE — 71046 X-RAY EXAM CHEST 2 VIEWS: CPT

## 2018-03-19 NOTE — PROGRESS NOTES
[facility]ESSION[facility]  [facility]  [address]  [address] [address]  Dept: [phone]  Loc: [phone]      Visit Date:  3/19/2018  Weight Management Postop Follow-up    HPI:      Mariya Galicia is a 25 y.o. female who is here today for 5 weeks follow up since  robotic-assisted sleeve gastrectomy performed by Dr. Sophia Pearson  on 9/70/18 complicated by a gastric leak. She had esophageal stent placed 3/2/18 by Dr. Taiwo Moya- scheduled to get stent removed 4/9/18. She had CXR prior to arrival showing proper placement of stent. Down 38# since surgery. BMI 44 . Drinking 64 oz of fluid and 60 grams of protein daily. No problems with bowel movements. Has taking OTC stool softener, prn. Intermittent nausea an occasional emesis. Has had blood tinged emesis x 1 yesterday. She is using the phenergen suppositories twice daily and taking Zofran twice daily. Using Scopolamine patch with benefit. She has not been taking Haldol as her PCP advised her not to. No GERD/ Reflux-continues to take PPI once daily. Advised to increase to BID per recommendation from Dr. Taiwo Moya. Denies CP/SOB. No Dizziness. No abdominal pain. No incisional discomfort. No sx of dehydration. No fever or chills. No problems urinating. Taking and tolerating Calcium and B12. Bariatric MV causes increased nausea so stopped taking. Advised to start Flinstone complete twice daily. Physical Activity:  walking  Days per week: daily  Time per session: 60 minutes    Current BMI: Body mass index is 44.18 kg/m².   Current Weight:   Wt Readings from Last 3 Encounters:   03/19/18 299 lb 3.2 oz (135.7 kg)   03/17/18 299 lb (135.6 kg)   03/12/18 (!) 301 lb (136.5 kg)     Pre-op Body Weight : 337      Past Medical History:  Past Medical History:   Diagnosis Date    Anxiety     Asthma     Chronic back pain     Depression     Diabetes mellitus (HCC)     GERD (gastroesophageal reflux disease)     Headache     for Nausea or Vomiting 40 tablet 0    lactulose (CHRONULAC) 10 GM/15ML solution Take 30 mLs by mouth daily as needed (constipation) 900 mL 1    omeprazole (PRILOSEC) 40 MG delayed release capsule Take 1 capsule by mouth 2 times daily (before meals) Open capsule and take in liquid 60 capsule 1    Prenatal Multivit-Min-Fe-FA (PRE-CHANTEL PO) Take 1 tablet by mouth daily      levothyroxine (SYNTHROID) 125 MCG tablet Take 125 mcg by mouth every morning      Melatonin 10 MG TABS Take 1 tablet by mouth nightly      Norgestimate-Eth Estradiol (SPRINTEC 28 PO) Take by mouth daily       Calcium Carbonate-Vitamin D (OS-ADIN 500 + D PO) Take by mouth 2 times daily      Albuterol Sulfate (PROVENTIL HFA IN) Inhale 2 puffs into the lungs every 4-6 hours as needed       EPINEPHrine HCl, Anaphylaxis, (EPIPEN IM) Inject into the muscle       No current facility-administered medications for this visit. Allergies: Allergies   Allergen Reactions    Bee Venom Swelling    Bactrim [Sulfamethoxazole-Trimethoprim] Hives    Mirapex [Pramipexole] Other (See Comments)     Leg cramps    Oxycodone Rash    Requip [Ropinirole] Nausea And Vomiting       Subjective:    Constitutional: Denies any fever, chills, fatigue. Wound: Denies any rash, skin color changes or wound problems. Resp: Denies any cough, shortness of breath. CV: Denies any chest pain, orthopnea or syncope. MS: Denies myalgias, arthralgias. GI: Denies any (+)nausea, (+)vomiting/ spit up- blood tinged x1 , diarrhea, constipation, melena, hematochezia. No incisional discomfort. : Denies any hematuria, hesitancy or dysuria. NEURO: Denies seizures, headache.     Objective:    /86 (Site: Right Arm, Position: Sitting, Cuff Size: Large Adult)   Pulse 100   Temp 99.1 °F (37.3 °C) (Oral)   Resp 16   Ht 5' 9\" (1.753 m)   Wt 299 lb 3.2 oz (135.7 kg)   LMP 2018 (Approximate)   BMI 44.18 kg/m²     Physical Examination:   Constitutional: Alert and 5. Screening for malnutrition  CBC Auto Differential    Comprehensive Metabolic Panel    Prealbumin    XR CHEST STANDARD (2 VW)     Plan:    Stay well hydrated. Drink a minimum of 64 oz of non-carbonated, non-caffeinated fluids daily. Nutritional education occurred during visit. Continue  60-80 grams of protein each day. Liquid diet only. Signs and symptoms reviewed with patient that would be concerning and need her to return to office for re-evaluation. Patient will call if any questions or concerns arrise. No lifting, pushing, pulling over 20#  No abdominal exercises  Wear abdominal binder prn  Importance of physical activity discussed with patient. Increase physical activity as tolerated  Continue taking  Calcium and B12 as directed  Add Flinstone complete- 2 daily  Continue PPI - increase to BID  Encouraged to attend support groups  Liquid diet only  6 week labs ordered- to be drawn prior to next apt  CXR ordered- complete prior to next apt for stent placement  Continue Phenergen suppositories- has one refill remaining  Has another bottle of Zofran and Reglan at home- advised to take as rx  Continue Scope patch prn   To follow-up with Dr. Ruba Yo, as scheduled April 9th. Return in about 1 week (around 3/26/2018) for postop follow up. I spent over 25 minutes with the patient, with greater that 50% of that time spent on face counseling for nutrition and exercise.     Electronically signed by NYDIA Pizarro on 3/19/2018 at 4:27 PM

## 2018-03-20 DIAGNOSIS — T81.43XA POSTPROCEDURAL INTRAABDOMINAL ABSCESS: ICD-10-CM

## 2018-03-20 DIAGNOSIS — G89.18 ACUTE POSTOPERATIVE PAIN: ICD-10-CM

## 2018-03-20 DIAGNOSIS — Z98.84 STATUS POST BARIATRIC SURGERY: ICD-10-CM

## 2018-03-21 ENCOUNTER — HOSPITAL ENCOUNTER (EMERGENCY)
Age: 28
Discharge: HOME | End: 2018-03-21
Attending: FAMILY MEDICINE
Payer: COMMERCIAL

## 2018-03-21 ENCOUNTER — APPOINTMENT (OUTPATIENT)
Dept: CT IMAGING | Age: 28
End: 2018-03-21
Payer: COMMERCIAL

## 2018-03-21 VITALS
OXYGEN SATURATION: 98 % | DIASTOLIC BLOOD PRESSURE: 71 MMHG | RESPIRATION RATE: 20 BRPM | HEART RATE: 71 BPM | SYSTOLIC BLOOD PRESSURE: 137 MMHG | TEMPERATURE: 97 F

## 2018-03-21 DIAGNOSIS — R10.13 ABDOMINAL PAIN, EPIGASTRIC: Primary | ICD-10-CM

## 2018-03-21 LAB
ALBUMIN SERPL-MCNC: 3.1 GM/DL (ref 3.4–5)
ALP BLD-CCNC: 80 U/L (ref 46–116)
ALT SERPL-CCNC: 53 U/L (ref 14–63)
AMORPHOUS: ABNORMAL
ANION GAP: 10 MEQ/L (ref 8–16)
AST SERPL-CCNC: 26 U/L (ref 15–37)
BACTERIA: ABNORMAL
BASOPHILS # BLD: 0.5 % (ref 0–3)
BILIRUB SERPL-MCNC: 0.6 MG/DL (ref 0.2–1)
BILIRUBIN URINE: ABNORMAL
BLOOD, URINE: ABNORMAL
BUN BLDV-MCNC: 11 MG/DL (ref 7–18)
CASTS UA: ABNORMAL /LPF
CHARACTER, URINE: ABNORMAL
CHLORIDE BLD-SCNC: 102 MEQ/L (ref 98–107)
CO2: 27 MEQ/L (ref 21–32)
COLOR: ABNORMAL
CREAT SERPL-MCNC: 0.8 MG/DL (ref 0.6–1.3)
CRYSTALS, UA: ABNORMAL
EOSINOPHILS RELATIVE PERCENT: 4.5 % (ref 0–4)
EPITHELIAL CELLS, UA: ABNORMAL /HPF
GFR, ESTIMATED: 89 ML/MIN/1.73M2
GLUCOSE BLD-MCNC: 86 MG/DL (ref 74–106)
GLUCOSE, URINE: NEGATIVE MG/DL
HCT VFR BLD CALC: 37.7 % (ref 37–47)
HEMOGLOBIN: 12.6 GM/DL (ref 12–16)
ICTOTEST: NEGATIVE
KETONES, URINE: 15
LEUKOCYTE ESTERASE, URINE: NEGATIVE
LIPASE: 309 U/L (ref 73–393)
LYMPHOCYTES # BLD: 22.1 % (ref 15–47)
MCH RBC QN AUTO: 30.1 PG (ref 27–31)
MCHC RBC AUTO-ENTMCNC: 33.5 GM/DL (ref 33–37)
MCV RBC AUTO: 89.6 FL (ref 81–99)
MONOCYTES: 9.4 % (ref 0–12)
MUCUS: ABNORMAL
NITRITE, URINE: NEGATIVE
PDW BLD-RTO: 14 % (ref 11.5–14.5)
PH UA: 5.5 (ref 5–9)
PLATELET # BLD: 153 THOU/MM3 (ref 130–400)
PLATELET ESTIMATE: ABNORMAL
PMV BLD AUTO: 9.9 FL (ref 7.4–10.4)
POC CALCIUM: 9.2 MG/DL (ref 8.5–10.1)
POTASSIUM SERPL-SCNC: 3.8 MEQ/L (ref 3.5–5.1)
PREGNANCY, SERUM: NEGATIVE
PROTEIN UA: 30 MG/DL
RBC # BLD: 4.2 MILL/MM3 (ref 4.2–5.4)
RBC UA: > 100 /HPF
REFLEX TO URINE C & S: ABNORMAL
SEGS: 63.5 % (ref 43–75)
SODIUM BLD-SCNC: 139 MEQ/L (ref 136–145)
SPECIFIC GRAVITY UA: 1.02 (ref 1–1.03)
TOTAL PROTEIN: 7.7 GM/DL (ref 6.4–8.2)
TROPONIN I: < 0.017 NG/ML (ref 0.02–0.05)
UROBILINOGEN, URINE: 1 EU/DL (ref 0–1)
WBC # BLD: 7.6 THOU/MM3 (ref 4.8–10.8)
WBC UA: ABNORMAL /HPF

## 2018-03-21 PROCEDURE — 96374 THER/PROPH/DIAG INJ IV PUSH: CPT

## 2018-03-21 PROCEDURE — 96361 HYDRATE IV INFUSION ADD-ON: CPT

## 2018-03-21 PROCEDURE — 85025 COMPLETE CBC W/AUTO DIFF WBC: CPT

## 2018-03-21 PROCEDURE — 6360000004 HC RX CONTRAST MEDICATION: Performed by: FAMILY MEDICINE

## 2018-03-21 PROCEDURE — 96375 TX/PRO/DX INJ NEW DRUG ADDON: CPT

## 2018-03-21 PROCEDURE — 99284 EMERGENCY DEPT VISIT MOD MDM: CPT

## 2018-03-21 PROCEDURE — 6360000002 HC RX W HCPCS: Performed by: FAMILY MEDICINE

## 2018-03-21 PROCEDURE — 74177 CT ABD & PELVIS W/CONTRAST: CPT

## 2018-03-21 PROCEDURE — 80053 COMPREHEN METABOLIC PANEL: CPT

## 2018-03-21 PROCEDURE — 81001 URINALYSIS AUTO W/SCOPE: CPT

## 2018-03-21 PROCEDURE — 36415 COLL VENOUS BLD VENIPUNCTURE: CPT

## 2018-03-21 PROCEDURE — 84484 ASSAY OF TROPONIN QUANT: CPT

## 2018-03-21 PROCEDURE — 87086 URINE CULTURE/COLONY COUNT: CPT

## 2018-03-21 PROCEDURE — 84703 CHORIONIC GONADOTROPIN ASSAY: CPT

## 2018-03-21 PROCEDURE — 83690 ASSAY OF LIPASE: CPT

## 2018-03-21 PROCEDURE — 2580000003 HC RX 258: Performed by: FAMILY MEDICINE

## 2018-03-21 RX ORDER — KETOROLAC TROMETHAMINE 30 MG/ML
30 INJECTION, SOLUTION INTRAMUSCULAR; INTRAVENOUS ONCE
Status: COMPLETED | OUTPATIENT
Start: 2018-03-21 | End: 2018-03-21

## 2018-03-21 RX ORDER — ONDANSETRON 2 MG/ML
8 INJECTION INTRAMUSCULAR; INTRAVENOUS ONCE
Status: COMPLETED | OUTPATIENT
Start: 2018-03-21 | End: 2018-03-21

## 2018-03-21 RX ORDER — 0.9 % SODIUM CHLORIDE 0.9 %
1000 INTRAVENOUS SOLUTION INTRAVENOUS ONCE
Status: COMPLETED | OUTPATIENT
Start: 2018-03-21 | End: 2018-03-21

## 2018-03-21 RX ADMIN — HYDROMORPHONE HYDROCHLORIDE 1 MG: 1 INJECTION, SOLUTION INTRAMUSCULAR; INTRAVENOUS; SUBCUTANEOUS at 17:06

## 2018-03-21 RX ADMIN — SODIUM CHLORIDE 1000 ML: 9 INJECTION, SOLUTION INTRAVENOUS at 15:09

## 2018-03-21 RX ADMIN — IOPAMIDOL 100 ML: 755 INJECTION, SOLUTION INTRAVENOUS at 15:32

## 2018-03-21 RX ADMIN — KETOROLAC TROMETHAMINE 30 MG: 30 INJECTION, SOLUTION INTRAMUSCULAR at 15:13

## 2018-03-21 RX ADMIN — ONDANSETRON 8 MG: 2 INJECTION INTRAMUSCULAR; INTRAVENOUS at 15:11

## 2018-03-21 ASSESSMENT — ENCOUNTER SYMPTOMS
WHEEZING: 0
SORE THROAT: 0
BACK PAIN: 0
EYE PAIN: 0
SHORTNESS OF BREATH: 0
NAUSEA: 1
EYE DISCHARGE: 0
DIARRHEA: 0
ABDOMINAL PAIN: 1
VOMITING: 1
RHINORRHEA: 0
COUGH: 0

## 2018-03-21 ASSESSMENT — PAIN SCALES - GENERAL
PAINLEVEL_OUTOF10: 6
PAINLEVEL_OUTOF10: 7
PAINLEVEL_OUTOF10: 4

## 2018-03-21 ASSESSMENT — PAIN DESCRIPTION - LOCATION
LOCATION: ABDOMEN

## 2018-03-21 ASSESSMENT — PAIN DESCRIPTION - ORIENTATION: ORIENTATION: UPPER;MID

## 2018-03-21 NOTE — ED PROVIDER NOTES
indicated that her brother is alive. She indicated that her maternal grandmother is . She indicated that her maternal grandfather is . She indicated that her paternal grandmother is . She indicated that her paternal grandfather is . family history includes Birth Defects in her mother; Diabetes in her father and maternal grandmother; Heart Disease in her maternal grandfather; High Blood Pressure in her brother and father; Obesity in her brother, father, and mother. SOCIAL HISTORY      reports that she quit smoking about 3 years ago. Her smoking use included Cigarettes. She smoked 0.50 packs per day. She has never used smokeless tobacco. She reports that she does not drink alcohol or use drugs. PHYSICAL EXAM     INITIAL VITALS:  oral temperature is 97 °F (36.1 °C). Her blood pressure is 137/71 and her pulse is 71. Her respiration is 20 and oxygen saturation is 98%. Physical Exam   Constitutional: She is oriented to person, place, and time. She appears well-developed and well-nourished. HENT:   Head: Normocephalic and atraumatic. Right Ear: External ear normal.   Left Ear: External ear normal.   Eyes: Right eye exhibits no discharge. Left eye exhibits no discharge. No scleral icterus. Neck: Normal range of motion. No JVD present. No tracheal deviation present. No thyromegaly present. Cardiovascular: Normal rate and regular rhythm. Exam reveals no gallop and no friction rub. No murmur heard. Pulmonary/Chest: Effort normal and breath sounds normal. No stridor. No respiratory distress. She has no wheezes. She has no rales. Abdominal: Soft. She exhibits no distension. There is tenderness. There is no rebound and no guarding. Epigastric tenderness to palpation   Musculoskeletal: She exhibits no edema or tenderness. Neurological: She is alert and oriented to person, place, and time. Coordination normal.   Skin: Skin is warm and dry.  No rash (On exposed body

## 2018-03-21 NOTE — ED NOTES
Patient states desire to go home. Father here to take patient home.      Mary Munoz RN  03/21/18 0797

## 2018-03-22 ENCOUNTER — TELEPHONE (OUTPATIENT)
Dept: BARIATRICS/WEIGHT MGMT | Age: 28
End: 2018-03-22

## 2018-03-22 NOTE — TELEPHONE ENCOUNTER
Patient called stating she is having continued pain where she has stent inserted. Patient asked for another pain medication and states Hycet, which she has been taking is not helping her pain.     I informed Jessenia Jackson and then transferred phone call to her upon her request.

## 2018-03-22 NOTE — TELEPHONE ENCOUNTER
Spoke to [facility]. Reports that she went to the urgent care yesterday as she was not holding down fluids. Given IV fluids. Doing better with fluids today. c/o increased pain with stent. Currently taking Hycet 10ml every 6 hours with minimal benefit. Advised to increase Hycet to 15ml every 6 hours. Pt voiced understanding. Has plenty of Hycet- picked up 200ml bottle yesterday.

## 2018-03-23 ENCOUNTER — TELEPHONE (OUTPATIENT)
Dept: BARIATRICS/WEIGHT MGMT | Age: 28
End: 2018-03-23

## 2018-03-23 DIAGNOSIS — Z98.84 STATUS POST BARIATRIC SURGERY: ICD-10-CM

## 2018-03-23 DIAGNOSIS — R11.0 NAUSEA: Primary | ICD-10-CM

## 2018-03-23 LAB
ORGANISM: ABNORMAL
URINE CULTURE REFLEX: ABNORMAL

## 2018-03-23 RX ORDER — HALOPERIDOL 0.5 MG/1
0.5 TABLET ORAL PRN
Qty: 20 TABLET | Refills: 0 | Status: SHIPPED | OUTPATIENT
Start: 2018-03-23 | End: 2018-08-14 | Stop reason: ALTCHOICE

## 2018-03-23 RX ORDER — METOCLOPRAMIDE 10 MG/1
10 TABLET ORAL EVERY 6 HOURS PRN
Qty: 30 TABLET | Refills: 1 | Status: SHIPPED | OUTPATIENT
Start: 2018-03-23 | End: 2018-08-14

## 2018-03-23 RX ORDER — ONDANSETRON 4 MG/1
4 TABLET, FILM COATED ORAL EVERY 4 HOURS PRN
Qty: 30 TABLET | Refills: 1 | Status: SHIPPED | OUTPATIENT
Start: 2018-03-23 | End: 2018-08-14

## 2018-03-23 NOTE — TELEPHONE ENCOUNTER
Dr. Kaya Cruz ordered Haldol , Zofran and Reglan. Are you taking omeprazole twice per day- yes - good Dr. Yrn Brannon recommended that. Are you taking Reglan?- yes  Get our prescriptions and start taking the Haldol only- see if that helps with your nausea-pt voiced understanding and agreed to begin low dose Haldol. Dr. Kaya Cruz is not going to order additional pain medication right now- liquid Hycet is the best absorped pain option at this time. Patient has drank 11 oz of protein today-I encouraged her to try water and sugar free popcicles. I also ask about her activity today- patient related she had went to [facility] and walked around for 30-45 minutes. I explained that the increased activity could be the cause for her discomfort. While she does need to walk-result will be some abdominal discomfort versus if she is sitting down relaxing. Patient agreed. \"Can the stent come out any sooner than April 9? \" No-I replied. We want to be sure you are completely healed before removing that stent.

## 2018-03-26 ENCOUNTER — HOSPITAL ENCOUNTER (EMERGENCY)
Age: 28
Discharge: HOME | End: 2018-03-26
Attending: FAMILY MEDICINE
Payer: COMMERCIAL

## 2018-03-26 VITALS
BODY MASS INDEX: 43.4 KG/M2 | HEIGHT: 69 IN | RESPIRATION RATE: 20 BRPM | TEMPERATURE: 98.3 F | WEIGHT: 293 LBS | SYSTOLIC BLOOD PRESSURE: 152 MMHG | OXYGEN SATURATION: 96 % | DIASTOLIC BLOOD PRESSURE: 107 MMHG | HEART RATE: 99 BPM

## 2018-03-26 DIAGNOSIS — G89.18 ACUTE POSTOPERATIVE PAIN: ICD-10-CM

## 2018-03-26 DIAGNOSIS — R11.0 NAUSEA: ICD-10-CM

## 2018-03-26 DIAGNOSIS — Z98.84 STATUS POST BARIATRIC SURGERY: ICD-10-CM

## 2018-03-26 DIAGNOSIS — R10.13 ABDOMINAL PAIN, EPIGASTRIC: Primary | ICD-10-CM

## 2018-03-26 DIAGNOSIS — T81.43XA POSTPROCEDURAL INTRAABDOMINAL ABSCESS: ICD-10-CM

## 2018-03-26 LAB
ALBUMIN SERPL-MCNC: 3.1 GM/DL (ref 3.4–5)
ALP BLD-CCNC: 80 U/L (ref 46–116)
ALT SERPL-CCNC: 35 U/L (ref 14–63)
AMYLASE: 32 U/L (ref 25–115)
ANION GAP: 12 MEQ/L (ref 8–16)
AST SERPL-CCNC: 22 U/L (ref 15–37)
BASOPHILS # BLD: 0.4 % (ref 0–3)
BILIRUB SERPL-MCNC: 0.5 MG/DL (ref 0.2–1)
BILIRUBIN DIRECT: 0.18 MG/DL (ref 0–0.2)
BUN BLDV-MCNC: 6 MG/DL (ref 7–18)
CHLORIDE BLD-SCNC: 103 MEQ/L (ref 98–107)
CO2: 24 MEQ/L (ref 21–32)
CREAT SERPL-MCNC: 0.8 MG/DL (ref 0.6–1.3)
EOSINOPHILS RELATIVE PERCENT: 3.6 % (ref 0–4)
GFR, ESTIMATED: 89 ML/MIN/1.73M2
GLUCOSE BLD-MCNC: 99 MG/DL (ref 74–106)
HCT VFR BLD CALC: 38.6 % (ref 37–47)
HEMOGLOBIN: 12.9 GM/DL (ref 12–16)
LIPASE: 236 U/L (ref 73–393)
LYMPHOCYTES # BLD: 20.4 % (ref 15–47)
MCH RBC QN AUTO: 30 PG (ref 27–31)
MCHC RBC AUTO-ENTMCNC: 33.3 GM/DL (ref 33–37)
MCV RBC AUTO: 90.1 FL (ref 81–99)
MONOCYTES: 7.8 % (ref 0–12)
PDW BLD-RTO: 13.5 % (ref 11.5–14.5)
PLATELET # BLD: 183 THOU/MM3 (ref 130–400)
PMV BLD AUTO: 9.6 FL (ref 7.4–10.4)
POC CALCIUM: 9.2 MG/DL (ref 8.5–10.1)
POTASSIUM SERPL-SCNC: 3.4 MEQ/L (ref 3.5–5.1)
RBC # BLD: 4.29 MILL/MM3 (ref 4.2–5.4)
SEGS: 67.8 % (ref 43–75)
SODIUM BLD-SCNC: 139 MEQ/L (ref 136–145)
TOTAL PROTEIN: 7.5 GM/DL (ref 6.4–8.2)
WBC # BLD: 7.8 THOU/MM3 (ref 4.8–10.8)

## 2018-03-26 PROCEDURE — 2580000003 HC RX 258: Performed by: FAMILY MEDICINE

## 2018-03-26 PROCEDURE — 36415 COLL VENOUS BLD VENIPUNCTURE: CPT

## 2018-03-26 PROCEDURE — 83690 ASSAY OF LIPASE: CPT

## 2018-03-26 PROCEDURE — 80048 BASIC METABOLIC PNL TOTAL CA: CPT

## 2018-03-26 PROCEDURE — 6360000002 HC RX W HCPCS: Performed by: FAMILY MEDICINE

## 2018-03-26 PROCEDURE — 82150 ASSAY OF AMYLASE: CPT

## 2018-03-26 PROCEDURE — 96375 TX/PRO/DX INJ NEW DRUG ADDON: CPT

## 2018-03-26 PROCEDURE — 99283 EMERGENCY DEPT VISIT LOW MDM: CPT

## 2018-03-26 PROCEDURE — 96374 THER/PROPH/DIAG INJ IV PUSH: CPT

## 2018-03-26 PROCEDURE — 80076 HEPATIC FUNCTION PANEL: CPT

## 2018-03-26 PROCEDURE — 85025 COMPLETE CBC W/AUTO DIFF WBC: CPT

## 2018-03-26 RX ORDER — ONDANSETRON 2 MG/ML
8 INJECTION INTRAMUSCULAR; INTRAVENOUS ONCE
Status: COMPLETED | OUTPATIENT
Start: 2018-03-26 | End: 2018-03-26

## 2018-03-26 RX ORDER — 0.9 % SODIUM CHLORIDE 0.9 %
1000 INTRAVENOUS SOLUTION INTRAVENOUS ONCE
Status: COMPLETED | OUTPATIENT
Start: 2018-03-26 | End: 2018-03-26

## 2018-03-26 RX ADMIN — HYDROMORPHONE HYDROCHLORIDE 1 MG: 1 INJECTION, SOLUTION INTRAMUSCULAR; INTRAVENOUS; SUBCUTANEOUS at 16:40

## 2018-03-26 RX ADMIN — ONDANSETRON 8 MG: 2 INJECTION INTRAMUSCULAR; INTRAVENOUS at 16:40

## 2018-03-26 RX ADMIN — SODIUM CHLORIDE 1000 ML: 9 INJECTION, SOLUTION INTRAVENOUS at 16:17

## 2018-03-26 ASSESSMENT — PAIN DESCRIPTION - PAIN TYPE: TYPE: ACUTE PAIN

## 2018-03-26 ASSESSMENT — PAIN SCALES - GENERAL
PAINLEVEL_OUTOF10: 8
PAINLEVEL_OUTOF10: 8

## 2018-03-26 ASSESSMENT — PAIN DESCRIPTION - ORIENTATION: ORIENTATION: MID;UPPER

## 2018-03-26 ASSESSMENT — PAIN DESCRIPTION - DESCRIPTORS: DESCRIPTORS: CONSTANT;ACHING

## 2018-03-26 ASSESSMENT — PAIN DESCRIPTION - ONSET: ONSET: ON-GOING

## 2018-03-26 ASSESSMENT — PAIN DESCRIPTION - FREQUENCY: FREQUENCY: CONTINUOUS

## 2018-03-26 ASSESSMENT — PAIN DESCRIPTION - LOCATION: LOCATION: ABDOMEN

## 2018-03-26 ASSESSMENT — PAIN DESCRIPTION - PROGRESSION: CLINICAL_PROGRESSION: NOT CHANGED

## 2018-03-26 NOTE — ED TRIAGE NOTES
Arrived to the [facility] for the evaluation of issues following a gastric sleeve bypass surgery. Surgery was 2/12 and has had issues since. Had to have a stent placed between esophagus and stomach on 3/2/18. Stent due to come out April 9. Continues to have pain, nausea and vomiting. Follows up with Dr Kaya Cruz and Dr Carmen Oviedo. Out of pain medication, liquid tylenol does not work. Using prescription antiemetic meds with no effectiveness. Dr David Jarrett assessed.

## 2018-03-27 ENCOUNTER — HOSPITAL ENCOUNTER (OUTPATIENT)
Dept: GENERAL RADIOLOGY | Age: 28
Discharge: HOME | End: 2018-03-27
Payer: COMMERCIAL

## 2018-03-27 ENCOUNTER — HOSPITAL ENCOUNTER (OUTPATIENT)
Age: 28
Discharge: HOME | End: 2018-03-27
Payer: COMMERCIAL

## 2018-03-27 ENCOUNTER — OFFICE VISIT (OUTPATIENT)
Dept: BARIATRICS/WEIGHT MGMT | Age: 28
End: 2018-03-27

## 2018-03-27 ENCOUNTER — TELEPHONE (OUTPATIENT)
Dept: BARIATRICS/WEIGHT MGMT | Age: 28
End: 2018-03-27

## 2018-03-27 VITALS
BODY MASS INDEX: 43.4 KG/M2 | HEIGHT: 69 IN | HEART RATE: 76 BPM | WEIGHT: 293 LBS | DIASTOLIC BLOOD PRESSURE: 98 MMHG | SYSTOLIC BLOOD PRESSURE: 144 MMHG | TEMPERATURE: 98.5 F

## 2018-03-27 DIAGNOSIS — Z98.890 PONV (POSTOPERATIVE NAUSEA AND VOMITING): ICD-10-CM

## 2018-03-27 DIAGNOSIS — E66.01 MORBID OBESITY WITH BMI OF 40.0-44.9, ADULT (HCC): ICD-10-CM

## 2018-03-27 DIAGNOSIS — K91.89 LEAK OF ANASTOMOSIS BETWEEN GASTROINTESTINAL STRUCTURES: ICD-10-CM

## 2018-03-27 DIAGNOSIS — Z13.21 SCREENING FOR MALNUTRITION: ICD-10-CM

## 2018-03-27 DIAGNOSIS — K91.2 POSTSURGICAL MALABSORPTION: ICD-10-CM

## 2018-03-27 DIAGNOSIS — R11.2 PONV (POSTOPERATIVE NAUSEA AND VOMITING): ICD-10-CM

## 2018-03-27 DIAGNOSIS — Z98.84 S/P LAPAROSCOPIC SLEEVE GASTRECTOMY: Primary | ICD-10-CM

## 2018-03-27 DIAGNOSIS — Z98.84 S/P LAPAROSCOPIC SLEEVE GASTRECTOMY: ICD-10-CM

## 2018-03-27 DIAGNOSIS — Z13.21 MALNUTRITION SCREEN: ICD-10-CM

## 2018-03-27 DIAGNOSIS — E66.01 MORBID OBESITY WITH BMI OF 45.0-49.9, ADULT (HCC): ICD-10-CM

## 2018-03-27 LAB
ALBUMIN SERPL-MCNC: 3.5 G/DL (ref 3.5–5.1)
ALP BLD-CCNC: 76 U/L (ref 38–126)
ALT SERPL-CCNC: 24 U/L (ref 11–66)
ANION GAP SERPL CALCULATED.3IONS-SCNC: 15 MEQ/L (ref 8–16)
ANISOCYTOSIS: ABNORMAL
AST SERPL-CCNC: 25 U/L (ref 5–40)
BASOPHILS # BLD: 0.8 %
BASOPHILS ABSOLUTE: 0 THOU/MM3 (ref 0–0.1)
BILIRUB SERPL-MCNC: 0.5 MG/DL (ref 0.3–1.2)
BUN BLDV-MCNC: 4 MG/DL (ref 7–22)
CALCIUM SERPL-MCNC: 9.1 MG/DL (ref 8.5–10.5)
CHLORIDE BLD-SCNC: 103 MEQ/L (ref 98–111)
CO2: 22 MEQ/L (ref 23–33)
CREAT SERPL-MCNC: 0.8 MG/DL (ref 0.4–1.2)
EOSINOPHIL # BLD: 3.8 %
EOSINOPHILS ABSOLUTE: 0.2 THOU/MM3 (ref 0–0.4)
GFR SERPL CREATININE-BSD FRML MDRD: 84 ML/MIN/1.73M2
GLUCOSE BLD-MCNC: 120 MG/DL (ref 70–108)
HCT VFR BLD CALC: 37.8 % (ref 37–47)
HEMOGLOBIN: 12.4 GM/DL (ref 12–16)
LYMPHOCYTES # BLD: 22.4 %
LYMPHOCYTES ABSOLUTE: 1.3 THOU/MM3 (ref 1–4.8)
MCH RBC QN AUTO: 29.9 PG (ref 27–31)
MCHC RBC AUTO-ENTMCNC: 32.7 GM/DL (ref 33–37)
MCV RBC AUTO: 91.7 FL (ref 81–99)
MONOCYTES # BLD: 8.4 %
MONOCYTES ABSOLUTE: 0.5 THOU/MM3 (ref 0.4–1.3)
NUCLEATED RED BLOOD CELLS: 0 /100 WBC
PDW BLD-RTO: 16 % (ref 11.5–14.5)
PLATELET # BLD: 197 THOU/MM3 (ref 130–400)
PMV BLD AUTO: 11.4 FL (ref 7.4–10.4)
POTASSIUM SERPL-SCNC: 3.6 MEQ/L (ref 3.5–5.2)
RBC # BLD: 4.13 MILL/MM3 (ref 4.2–5.4)
SEG NEUTROPHILS: 64.6 %
SEGMENTED NEUTROPHILS ABSOLUTE COUNT: 3.6 THOU/MM3 (ref 1.8–7.7)
SODIUM BLD-SCNC: 140 MEQ/L (ref 135–145)
TOTAL PROTEIN: 6.8 G/DL (ref 6.1–8)
WBC # BLD: 5.6 THOU/MM3 (ref 4.8–10.8)

## 2018-03-27 PROCEDURE — 80053 COMPREHEN METABOLIC PANEL: CPT

## 2018-03-27 PROCEDURE — 99024 POSTOP FOLLOW-UP VISIT: CPT | Performed by: PHYSICIAN ASSISTANT

## 2018-03-27 PROCEDURE — 36415 COLL VENOUS BLD VENIPUNCTURE: CPT

## 2018-03-27 PROCEDURE — 74018 RADEX ABDOMEN 1 VIEW: CPT

## 2018-03-27 PROCEDURE — 71046 X-RAY EXAM CHEST 2 VIEWS: CPT

## 2018-03-27 PROCEDURE — 84134 ASSAY OF PREALBUMIN: CPT

## 2018-03-27 PROCEDURE — 85025 COMPLETE CBC W/AUTO DIFF WBC: CPT

## 2018-03-27 NOTE — PROGRESS NOTES
[facility]ESSIONAL [facility]  [facility]  [address]  [address] [address]  Dept: [phone]  Loc: [phone]      Visit Date:  3/27/2018  Weight Management Postop Follow-up    HPI:      Morgan Iqbal is a 25 y.o. female who is here today for 6 weeks follow up since  robotic-assisted sleeve gastrectomy performed by Dr. Juvencio Shaver  on 8/27/63 complicated by a gastric leak. She had esophageal stent placed 3/2/18 by Dr. Ruba Yo- scheduled to get stent removed 4/9/18. Down 38# since surgery. BMI 44. She had CXR prior to apt. Unable to verify stent placement. Spoke with Dr. David Nice who recommended a abdominal film to confirm lower esophogeal stent placement. She went to the Urgent Care in [facility] yesterday and received IV fluids/ nausea meds and dilaudid. She is struggling to get in her fluids. She has only gotten in 16oz of fluids today. Denies feeling dehydrated. C/o nausea. Occasional emesis. Taking reglan, zofran, haldol, scopolomine patch and phenergen suppository. Feels that the suppository and haldol seem to help the most- taking both twice daily. Drinking 2 protein shakes, most days. Taking Omeprazole BID as recommended. No fevers or chills. Taking 2 Flinstone complete vitamins daily, taking calcium chew 3 times daily and B12 daily. BP elevated today- has not been taking BP medication. Physical Activity: Walking  Days per week: daily  Time per session: 30-45 minutes    Current BMI: Body mass index is 44.15 kg/m².   Current Weight:   Wt Readings from Last 3 Encounters:   03/27/18 299 lb (135.6 kg)   03/26/18 297 lb (134.7 kg)   03/19/18 299 lb 3.2 oz (135.7 kg)     Pre-op Body Weight : 337      Past Medical History:  Past Medical History:   Diagnosis Date    Anxiety     Asthma     Chronic back pain     Depression     Diabetes mellitus ([address]     GERD (gastroesophageal reflux disease)     Headache     Hypertension     Infertility, female     Nausea 60-80 grams of protein each day. Liquid diet only. Signs and symptoms reviewed with patient that would be concerning and need her to return to office for re-evaluation. Patient will call if any questions or concerns arrise. No lifting, pushing, pulling over 20#  No abdominal exercises  Wear abdominal binder prn  Importance of physical activity discussed with patient. Increase physical activity as tolerated  Continue taking Flinstone complete, Calcium and B12 as discussed  Take PPI BID  Encouraged to attend support groups  Continue full liquid diet only  Continue nausea medications as needed  Call office if sx of dehydration occur. Will order IV fluids prn. Advised to discuss BP medication with PCP- last several BP's wrote down for patient today   To follow-up with Dr. Ruba Yo, as scheduled April 9th. Return in about 1 week (around 4/3/2018) for postop follow up. I spent over 15 minutes with the patient, with greater that 50% of that time spent on face counseling for nutrition and exercise.     Electronically signed by NYDIA Pizarro on 3/27/2018 at 3:06 PM

## 2018-03-28 ENCOUNTER — TELEPHONE (OUTPATIENT)
Dept: BARIATRICS/WEIGHT MGMT | Age: 28
End: 2018-03-28

## 2018-03-28 LAB — PREALBUMIN: 15.7 MG/DL (ref 20–40)

## 2018-03-29 ENCOUNTER — HOSPITAL ENCOUNTER (EMERGENCY)
Age: 28
Discharge: HOME | End: 2018-03-30
Attending: EMERGENCY MEDICINE
Payer: COMMERCIAL

## 2018-03-29 ENCOUNTER — TELEPHONE (OUTPATIENT)
Dept: BARIATRICS/WEIGHT MGMT | Age: 28
End: 2018-03-29

## 2018-03-29 DIAGNOSIS — Z98.890 PONV (POSTOPERATIVE NAUSEA AND VOMITING): ICD-10-CM

## 2018-03-29 DIAGNOSIS — R11.2 PONV (POSTOPERATIVE NAUSEA AND VOMITING): ICD-10-CM

## 2018-03-29 DIAGNOSIS — Z98.84 S/P LAPAROSCOPIC SLEEVE GASTRECTOMY: ICD-10-CM

## 2018-03-29 DIAGNOSIS — R10.13 ABDOMINAL PAIN, EPIGASTRIC: Primary | ICD-10-CM

## 2018-03-29 PROCEDURE — 99284 EMERGENCY DEPT VISIT MOD MDM: CPT

## 2018-03-29 RX ORDER — SCOLOPAMINE TRANSDERMAL SYSTEM 1 MG/1
1 PATCH, EXTENDED RELEASE TRANSDERMAL
Qty: 3 PATCH | Refills: 0 | Status: SHIPPED | OUTPATIENT
Start: 2018-03-29 | End: 2018-04-10 | Stop reason: SDUPTHER

## 2018-03-29 RX ORDER — MEPERIDINE HYDROCHLORIDE 50 MG/ML
50 INJECTION INTRAMUSCULAR; INTRAVENOUS; SUBCUTANEOUS ONCE
Status: COMPLETED | OUTPATIENT
Start: 2018-03-30 | End: 2018-03-30

## 2018-03-29 RX ORDER — METOCLOPRAMIDE HYDROCHLORIDE 5 MG/ML
10 INJECTION INTRAMUSCULAR; INTRAVENOUS ONCE
Status: COMPLETED | OUTPATIENT
Start: 2018-03-30 | End: 2018-03-30

## 2018-03-29 RX ORDER — 0.9 % SODIUM CHLORIDE 0.9 %
1000 INTRAVENOUS SOLUTION INTRAVENOUS ONCE
Status: COMPLETED | OUTPATIENT
Start: 2018-03-30 | End: 2018-03-30

## 2018-03-29 RX ORDER — SCOLOPAMINE TRANSDERMAL SYSTEM 1 MG/1
1 PATCH, EXTENDED RELEASE TRANSDERMAL
Qty: 3 PATCH | Refills: 0 | Status: CANCELLED | OUTPATIENT
Start: 2018-03-29

## 2018-03-29 ASSESSMENT — PAIN DESCRIPTION - PAIN TYPE: TYPE: ACUTE PAIN

## 2018-03-29 ASSESSMENT — PAIN SCALES - GENERAL: PAINLEVEL_OUTOF10: 8

## 2018-03-30 ENCOUNTER — APPOINTMENT (OUTPATIENT)
Dept: GENERAL RADIOLOGY | Age: 28
End: 2018-03-30
Payer: COMMERCIAL

## 2018-03-30 VITALS
SYSTOLIC BLOOD PRESSURE: 135 MMHG | HEART RATE: 81 BPM | DIASTOLIC BLOOD PRESSURE: 84 MMHG | RESPIRATION RATE: 16 BRPM | OXYGEN SATURATION: 97 % | TEMPERATURE: 98.9 F

## 2018-03-30 DIAGNOSIS — R10.13 EPIGASTRIC PAIN: Primary | ICD-10-CM

## 2018-03-30 DIAGNOSIS — R10.13 EPIGASTRIC ABDOMINAL PAIN: Primary | ICD-10-CM

## 2018-03-30 LAB
ALBUMIN SERPL-MCNC: 3 GM/DL (ref 3.4–5)
ALP BLD-CCNC: 81 U/L (ref 46–116)
ALT SERPL-CCNC: 32 U/L (ref 14–63)
AMYLASE: 25 U/L (ref 25–115)
ANION GAP: 11 MEQ/L (ref 8–16)
AST SERPL-CCNC: 21 U/L (ref 15–37)
BASOPHILS # BLD: 0.5 % (ref 0–3)
BILIRUB SERPL-MCNC: 0.5 MG/DL (ref 0.2–1)
BUN BLDV-MCNC: 5 MG/DL (ref 7–18)
CHLORIDE BLD-SCNC: 103 MEQ/L (ref 98–107)
CO2: 26 MEQ/L (ref 21–32)
CREAT SERPL-MCNC: 0.9 MG/DL (ref 0.6–1.3)
EOSINOPHILS RELATIVE PERCENT: 3.2 % (ref 0–4)
GFR, ESTIMATED: 78 ML/MIN/1.73M2
GLUCOSE BLD-MCNC: 95 MG/DL (ref 74–106)
HCT VFR BLD CALC: 37.9 % (ref 37–47)
HEMOGLOBIN: 12.6 GM/DL (ref 12–16)
LIPASE: 145 U/L (ref 73–393)
LYMPHOCYTES # BLD: 26.1 % (ref 15–47)
MCH RBC QN AUTO: 30 PG (ref 27–31)
MCHC RBC AUTO-ENTMCNC: 33.3 GM/DL (ref 33–37)
MCV RBC AUTO: 90 FL (ref 81–99)
MONOCYTES: 9.2 % (ref 0–12)
PDW BLD-RTO: 14.6 % (ref 11.5–14.5)
PLATELET # BLD: 181 THOU/MM3 (ref 130–400)
PLATELET ESTIMATE: ABNORMAL
PMV BLD AUTO: 9.3 FL (ref 7.4–10.4)
POC CALCIUM: 9 MG/DL (ref 8.5–10.1)
POTASSIUM SERPL-SCNC: 3.4 MEQ/L (ref 3.5–5.1)
RBC # BLD: 4.21 MILL/MM3 (ref 4.2–5.4)
SEGS: 61 % (ref 43–75)
SODIUM BLD-SCNC: 140 MEQ/L (ref 136–145)
TOTAL PROTEIN: 7.3 GM/DL (ref 6.4–8.2)
WBC # BLD: 7.1 THOU/MM3 (ref 4.8–10.8)

## 2018-03-30 PROCEDURE — 85025 COMPLETE CBC W/AUTO DIFF WBC: CPT

## 2018-03-30 PROCEDURE — 82150 ASSAY OF AMYLASE: CPT

## 2018-03-30 PROCEDURE — 96374 THER/PROPH/DIAG INJ IV PUSH: CPT

## 2018-03-30 PROCEDURE — 6360000002 HC RX W HCPCS: Performed by: EMERGENCY MEDICINE

## 2018-03-30 PROCEDURE — 96375 TX/PRO/DX INJ NEW DRUG ADDON: CPT

## 2018-03-30 PROCEDURE — 2580000003 HC RX 258: Performed by: EMERGENCY MEDICINE

## 2018-03-30 PROCEDURE — 6370000000 HC RX 637 (ALT 250 FOR IP): Performed by: EMERGENCY MEDICINE

## 2018-03-30 PROCEDURE — 80053 COMPREHEN METABOLIC PANEL: CPT

## 2018-03-30 PROCEDURE — 74022 RADEX COMPL AQT ABD SERIES: CPT

## 2018-03-30 PROCEDURE — 36415 COLL VENOUS BLD VENIPUNCTURE: CPT

## 2018-03-30 PROCEDURE — 96372 THER/PROPH/DIAG INJ SC/IM: CPT

## 2018-03-30 PROCEDURE — 83690 ASSAY OF LIPASE: CPT

## 2018-03-30 RX ORDER — MORPHINE SULFATE 20 MG/5ML
2.5-5 SOLUTION ORAL EVERY 6 HOURS PRN
Qty: 40 ML | Refills: 0 | Status: SHIPPED | OUTPATIENT
Start: 2018-03-30 | End: 2018-04-04 | Stop reason: SDUPTHER

## 2018-03-30 RX ORDER — MORPHINE SULFATE 4 MG/ML
4 INJECTION, SOLUTION INTRAMUSCULAR; INTRAVENOUS ONCE
Status: COMPLETED | OUTPATIENT
Start: 2018-03-30 | End: 2018-03-30

## 2018-03-30 RX ORDER — MORPHINE SULFATE 10 MG/5ML
5 SOLUTION ORAL EVERY 4 HOURS PRN
Qty: 75 ML | Refills: 0 | Status: SHIPPED | OUTPATIENT
Start: 2018-03-30 | End: 2018-03-30 | Stop reason: RX

## 2018-03-30 RX ORDER — PROMETHAZINE HYDROCHLORIDE 25 MG/ML
25 INJECTION, SOLUTION INTRAMUSCULAR; INTRAVENOUS ONCE
Status: COMPLETED | OUTPATIENT
Start: 2018-03-30 | End: 2018-03-30

## 2018-03-30 RX ORDER — MORPHINE SULFATE 4 MG/ML
4 INJECTION, SOLUTION INTRAMUSCULAR; INTRAVENOUS ONCE
Status: DISCONTINUED | OUTPATIENT
Start: 2018-03-30 | End: 2018-03-30

## 2018-03-30 RX ADMIN — PROMETHAZINE HYDROCHLORIDE 25 MG: 25 INJECTION INTRAMUSCULAR; INTRAVENOUS at 01:20

## 2018-03-30 RX ADMIN — MEPERIDINE HYDROCHLORIDE 50 MG: 50 INJECTION, SOLUTION INTRAMUSCULAR; INTRAVENOUS; SUBCUTANEOUS at 00:25

## 2018-03-30 RX ADMIN — SODIUM CHLORIDE 1000 ML: 9 INJECTION, SOLUTION INTRAVENOUS at 00:25

## 2018-03-30 RX ADMIN — METOCLOPRAMIDE 10 MG: 5 INJECTION, SOLUTION INTRAMUSCULAR; INTRAVENOUS at 00:25

## 2018-03-30 RX ADMIN — Medication 15 ML: at 00:58

## 2018-03-30 RX ADMIN — MORPHINE SULFATE 4 MG: 4 INJECTION INTRAVENOUS at 00:58

## 2018-03-30 ASSESSMENT — ENCOUNTER SYMPTOMS
WHEEZING: 0
DIARRHEA: 0
ABDOMINAL PAIN: 1
SHORTNESS OF BREATH: 0
SORE THROAT: 0
VOMITING: 1
BLOOD IN STOOL: 0

## 2018-03-30 ASSESSMENT — PAIN SCALES - GENERAL
PAINLEVEL_OUTOF10: 6
PAINLEVEL_OUTOF10: 8
PAINLEVEL_OUTOF10: 8

## 2018-03-31 ENCOUNTER — HOSPITAL ENCOUNTER (EMERGENCY)
Age: 28
Discharge: HOME | End: 2018-03-31
Attending: EMERGENCY MEDICINE
Payer: COMMERCIAL

## 2018-03-31 VITALS
HEART RATE: 90 BPM | WEIGHT: 293 LBS | DIASTOLIC BLOOD PRESSURE: 84 MMHG | SYSTOLIC BLOOD PRESSURE: 161 MMHG | RESPIRATION RATE: 16 BRPM | TEMPERATURE: 98.3 F | HEIGHT: 69 IN | BODY MASS INDEX: 43.4 KG/M2 | OXYGEN SATURATION: 97 %

## 2018-03-31 DIAGNOSIS — R11.2 INTRACTABLE VOMITING WITH NAUSEA, UNSPECIFIED VOMITING TYPE: Primary | ICD-10-CM

## 2018-03-31 LAB
ALBUMIN SERPL-MCNC: 3 GM/DL (ref 3.4–5)
ALP BLD-CCNC: 88 U/L (ref 46–116)
ALT SERPL-CCNC: 31 U/L (ref 14–63)
AMORPHOUS: ABNORMAL
ANION GAP: 10 MEQ/L (ref 8–16)
AST SERPL-CCNC: 24 U/L (ref 15–37)
BACTERIA: ABNORMAL
BASOPHILS # BLD: 0.4 % (ref 0–3)
BILIRUB SERPL-MCNC: 0.6 MG/DL (ref 0.2–1)
BILIRUBIN URINE: NEGATIVE
BLOOD, URINE: NEGATIVE
BUN BLDV-MCNC: 5 MG/DL (ref 7–18)
CASTS UA: ABNORMAL /LPF
CHARACTER, URINE: ABNORMAL
CHLORIDE BLD-SCNC: 103 MEQ/L (ref 98–107)
CO2: 28 MEQ/L (ref 21–32)
COLOR: YELLOW
CREAT SERPL-MCNC: 0.8 MG/DL (ref 0.6–1.3)
CRYSTALS, UA: ABNORMAL
EOSINOPHILS RELATIVE PERCENT: 2.3 % (ref 0–4)
EPITHELIAL CELLS, UA: ABNORMAL /HPF
GFR, ESTIMATED: 89 ML/MIN/1.73M2
GLUCOSE BLD-MCNC: 96 MG/DL (ref 74–106)
GLUCOSE, URINE: NEGATIVE MG/DL
HCT VFR BLD CALC: 37.6 % (ref 37–47)
HEMOGLOBIN: 12.5 GM/DL (ref 12–16)
KETONES, URINE: ABNORMAL
LEUKOCYTE ESTERASE, URINE: ABNORMAL
LIPASE: 223 U/L (ref 73–393)
LYMPHOCYTES # BLD: 14.8 % (ref 15–47)
MCH RBC QN AUTO: 30 PG (ref 27–31)
MCHC RBC AUTO-ENTMCNC: 33.3 GM/DL (ref 33–37)
MCV RBC AUTO: 90.3 FL (ref 81–99)
MONOCYTES: 7 % (ref 0–12)
MUCUS: ABNORMAL
NITRITE, URINE: NEGATIVE
PDW BLD-RTO: 14.2 % (ref 11.5–14.5)
PH UA: 6.5 (ref 5–9)
PLATELET # BLD: 172 THOU/MM3 (ref 130–400)
PMV BLD AUTO: 9 FL (ref 7.4–10.4)
POC CALCIUM: 9 MG/DL (ref 8.5–10.1)
POTASSIUM SERPL-SCNC: 3.7 MEQ/L (ref 3.5–5.1)
PROTEIN UA: ABNORMAL MG/DL
RBC # BLD: 4.17 MILL/MM3 (ref 4.2–5.4)
RBC UA: ABNORMAL /HPF
REFLEX TO URINE C & S: ABNORMAL
SEGS: 75.5 % (ref 43–75)
SODIUM BLD-SCNC: 141 MEQ/L (ref 136–145)
SPECIFIC GRAVITY UA: 1.02 (ref 1–1.03)
TOTAL PROTEIN: 7.7 GM/DL (ref 6.4–8.2)
UROBILINOGEN, URINE: 2 EU/DL (ref 0–1)
WBC # BLD: 8 THOU/MM3 (ref 4.8–10.8)
WBC UA: ABNORMAL /HPF

## 2018-03-31 PROCEDURE — 80053 COMPREHEN METABOLIC PANEL: CPT

## 2018-03-31 PROCEDURE — 99283 EMERGENCY DEPT VISIT LOW MDM: CPT

## 2018-03-31 PROCEDURE — 36415 COLL VENOUS BLD VENIPUNCTURE: CPT

## 2018-03-31 PROCEDURE — 87086 URINE CULTURE/COLONY COUNT: CPT

## 2018-03-31 PROCEDURE — C9113 INJ PANTOPRAZOLE SODIUM, VIA: HCPCS | Performed by: EMERGENCY MEDICINE

## 2018-03-31 PROCEDURE — 2580000003 HC RX 258: Performed by: EMERGENCY MEDICINE

## 2018-03-31 PROCEDURE — 6360000002 HC RX W HCPCS: Performed by: EMERGENCY MEDICINE

## 2018-03-31 PROCEDURE — 96375 TX/PRO/DX INJ NEW DRUG ADDON: CPT

## 2018-03-31 PROCEDURE — 81001 URINALYSIS AUTO W/SCOPE: CPT

## 2018-03-31 PROCEDURE — 85025 COMPLETE CBC W/AUTO DIFF WBC: CPT

## 2018-03-31 PROCEDURE — 96374 THER/PROPH/DIAG INJ IV PUSH: CPT

## 2018-03-31 PROCEDURE — 83690 ASSAY OF LIPASE: CPT

## 2018-03-31 RX ORDER — PANTOPRAZOLE SODIUM 40 MG/10ML
40 INJECTION, POWDER, LYOPHILIZED, FOR SOLUTION INTRAVENOUS DAILY
Status: DISCONTINUED | OUTPATIENT
Start: 2018-03-31 | End: 2018-03-31 | Stop reason: HOSPADM

## 2018-03-31 RX ORDER — MORPHINE SULFATE 4 MG/ML
4 INJECTION, SOLUTION INTRAMUSCULAR; INTRAVENOUS ONCE
Status: COMPLETED | OUTPATIENT
Start: 2018-03-31 | End: 2018-03-31

## 2018-03-31 RX ORDER — SODIUM CHLORIDE 9 MG/ML
INJECTION, SOLUTION INTRAVENOUS CONTINUOUS
Status: DISCONTINUED | OUTPATIENT
Start: 2018-03-31 | End: 2018-03-31 | Stop reason: HOSPADM

## 2018-03-31 RX ORDER — 0.9 % SODIUM CHLORIDE 0.9 %
1000 INTRAVENOUS SOLUTION INTRAVENOUS ONCE
Status: COMPLETED | OUTPATIENT
Start: 2018-03-31 | End: 2018-03-31

## 2018-03-31 RX ORDER — DIPHENHYDRAMINE HYDROCHLORIDE 50 MG/ML
25 INJECTION INTRAMUSCULAR; INTRAVENOUS ONCE
Status: COMPLETED | OUTPATIENT
Start: 2018-03-31 | End: 2018-03-31

## 2018-03-31 RX ORDER — SUCRALFATE ORAL 1 G/10ML
1 SUSPENSION ORAL 4 TIMES DAILY
Qty: 1200 ML | Refills: 3 | Status: ON HOLD | OUTPATIENT
Start: 2018-03-31 | End: 2018-04-09 | Stop reason: ALTCHOICE

## 2018-03-31 RX ORDER — METOCLOPRAMIDE HYDROCHLORIDE 5 MG/ML
10 INJECTION INTRAMUSCULAR; INTRAVENOUS ONCE
Status: COMPLETED | OUTPATIENT
Start: 2018-03-31 | End: 2018-03-31

## 2018-03-31 RX ORDER — ONDANSETRON 2 MG/ML
4 INJECTION INTRAMUSCULAR; INTRAVENOUS ONCE
Status: COMPLETED | OUTPATIENT
Start: 2018-03-31 | End: 2018-03-31

## 2018-03-31 RX ADMIN — PANTOPRAZOLE SODIUM 40 MG: 40 INJECTION, POWDER, FOR SOLUTION INTRAVENOUS at 13:40

## 2018-03-31 RX ADMIN — DIPHENHYDRAMINE HYDROCHLORIDE 25 MG: 50 INJECTION, SOLUTION INTRAMUSCULAR; INTRAVENOUS at 13:34

## 2018-03-31 RX ADMIN — ONDANSETRON 4 MG: 2 INJECTION INTRAMUSCULAR; INTRAVENOUS at 14:00

## 2018-03-31 RX ADMIN — SODIUM CHLORIDE: 9 INJECTION, SOLUTION INTRAVENOUS at 14:56

## 2018-03-31 RX ADMIN — SODIUM CHLORIDE 1000 ML: 9 INJECTION, SOLUTION INTRAVENOUS at 13:33

## 2018-03-31 RX ADMIN — METOCLOPRAMIDE 10 MG: 5 INJECTION, SOLUTION INTRAMUSCULAR; INTRAVENOUS at 13:40

## 2018-03-31 RX ADMIN — MORPHINE SULFATE 4 MG: 4 INJECTION INTRAVENOUS at 14:14

## 2018-03-31 ASSESSMENT — PAIN DESCRIPTION - PAIN TYPE: TYPE: ACUTE PAIN

## 2018-03-31 ASSESSMENT — PAIN DESCRIPTION - DESCRIPTORS: DESCRIPTORS: ACHING;DULL

## 2018-03-31 ASSESSMENT — PAIN SCALES - GENERAL
PAINLEVEL_OUTOF10: 8

## 2018-03-31 ASSESSMENT — PAIN DESCRIPTION - ORIENTATION: ORIENTATION: MID;UPPER

## 2018-03-31 ASSESSMENT — PAIN DESCRIPTION - FREQUENCY: FREQUENCY: CONTINUOUS

## 2018-03-31 ASSESSMENT — ENCOUNTER SYMPTOMS: RESPIRATORY NEGATIVE: 1

## 2018-04-02 ENCOUNTER — HOSPITAL ENCOUNTER (OUTPATIENT)
Dept: NURSING | Age: 28
Discharge: HOME | End: 2018-04-02
Payer: COMMERCIAL

## 2018-04-02 ENCOUNTER — HOSPITAL ENCOUNTER (OUTPATIENT)
Dept: GENERAL RADIOLOGY | Age: 28
Discharge: HOME | End: 2018-04-02
Payer: COMMERCIAL

## 2018-04-02 ENCOUNTER — TELEPHONE (OUTPATIENT)
Dept: BARIATRICS/WEIGHT MGMT | Age: 28
End: 2018-04-02

## 2018-04-02 VITALS
RESPIRATION RATE: 18 BRPM | HEART RATE: 81 BPM | TEMPERATURE: 98.5 F | DIASTOLIC BLOOD PRESSURE: 82 MMHG | SYSTOLIC BLOOD PRESSURE: 141 MMHG | OXYGEN SATURATION: 95 %

## 2018-04-02 DIAGNOSIS — R11.2 NAUSEA AND VOMITING, INTRACTABILITY OF VOMITING NOT SPECIFIED, UNSPECIFIED VOMITING TYPE: ICD-10-CM

## 2018-04-02 DIAGNOSIS — R11.2 NAUSEA AND VOMITING, INTRACTABILITY OF VOMITING NOT SPECIFIED, UNSPECIFIED VOMITING TYPE: Primary | ICD-10-CM

## 2018-04-02 DIAGNOSIS — R11.0 NAUSEA: Primary | ICD-10-CM

## 2018-04-02 LAB — URINE CULTURE REFLEX: NORMAL

## 2018-04-02 PROCEDURE — A4641 RADIOPHARM DX AGENT NOC: HCPCS | Performed by: SURGERY

## 2018-04-02 PROCEDURE — 74240 X-RAY XM UPR GI TRC 1CNTRST: CPT

## 2018-04-02 PROCEDURE — 6360000004 HC RX CONTRAST MEDICATION: Performed by: SURGERY

## 2018-04-02 PROCEDURE — 96361 HYDRATE IV INFUSION ADD-ON: CPT

## 2018-04-02 PROCEDURE — 96360 HYDRATION IV INFUSION INIT: CPT

## 2018-04-02 PROCEDURE — 2580000003 HC RX 258: Performed by: SURGERY

## 2018-04-02 RX ORDER — SODIUM CHLORIDE 9 MG/ML
INJECTION, SOLUTION INTRAVENOUS CONTINUOUS
Status: DISCONTINUED | OUTPATIENT
Start: 2018-04-02 | End: 2018-04-03 | Stop reason: HOSPADM

## 2018-04-02 RX ADMIN — SODIUM CHLORIDE: 9 INJECTION, SOLUTION INTRAVENOUS at 14:04

## 2018-04-02 RX ADMIN — BARIUM SULFATE 80 ML: 0.6 SUSPENSION ORAL at 13:33

## 2018-04-02 RX ADMIN — SODIUM CHLORIDE: 9 INJECTION, SOLUTION INTRAVENOUS at 15:09

## 2018-04-02 ASSESSMENT — PAIN DESCRIPTION - DESCRIPTORS: DESCRIPTORS: SORE

## 2018-04-02 ASSESSMENT — PAIN - FUNCTIONAL ASSESSMENT: PAIN_FUNCTIONAL_ASSESSMENT: 0-10

## 2018-04-02 NOTE — PROGRESS NOTES
1520 Pt tolerating infusion well. Denies needs at this time. 1607 Infusion complete. Pt tolerated it well with no complaints. Vitals stable. Pt discharged ambulatory with instructions with no complaints.          _m___ Safety:       (Environmental)   [place] to environment   Ensure ID band is correct and in place/ allergy band as needed   Assess for fall risk   Initiate fall precautions as applicable (fall band, side rails, etc.)   Call light within reach   Bed in low position/ wheels locked    _m___ Pain:        Assess pain level and characteristics   Administer analgesics as ordered   Assess effectiveness of pain management and report to MD as needed    _m___ Knowledge Deficit:   Assess baseline knowledge   Provide teaching at level of understanding   Provide teaching via preferred learning method   Evaluate teaching effectiveness    __m__ Hemodynamic/Respiratory Status:       (Pre and Post Procedure Monitoring)   Assess/Monitor vital signs and LOC   Assess Baseline SpO2 prior to any sedation   Obtain weight/height   Assess vital signs/ LOC until patient meets discharge criteria   Monitor procedure site and notify MD of any issues

## 2018-04-03 ENCOUNTER — OFFICE VISIT (OUTPATIENT)
Dept: BARIATRICS/WEIGHT MGMT | Age: 28
End: 2018-04-03

## 2018-04-03 VITALS
WEIGHT: 293 LBS | HEIGHT: 69 IN | DIASTOLIC BLOOD PRESSURE: 80 MMHG | HEART RATE: 80 BPM | BODY MASS INDEX: 43.4 KG/M2 | SYSTOLIC BLOOD PRESSURE: 118 MMHG | TEMPERATURE: 98.8 F

## 2018-04-03 DIAGNOSIS — R11.2 PONV (POSTOPERATIVE NAUSEA AND VOMITING): ICD-10-CM

## 2018-04-03 DIAGNOSIS — Z98.890 PONV (POSTOPERATIVE NAUSEA AND VOMITING): ICD-10-CM

## 2018-04-03 DIAGNOSIS — K21.9 GASTROESOPHAGEAL REFLUX DISEASE, ESOPHAGITIS PRESENCE NOT SPECIFIED: ICD-10-CM

## 2018-04-03 DIAGNOSIS — Z98.84 S/P LAPAROSCOPIC SLEEVE GASTRECTOMY: Primary | ICD-10-CM

## 2018-04-03 DIAGNOSIS — K91.89 LEAK OF ANASTOMOSIS BETWEEN GASTROINTESTINAL STRUCTURES: ICD-10-CM

## 2018-04-03 PROCEDURE — 99024 POSTOP FOLLOW-UP VISIT: CPT | Performed by: PHYSICIAN ASSISTANT

## 2018-04-03 RX ORDER — PANTOPRAZOLE SODIUM 40 MG/1
40 GRANULE, DELAYED RELEASE ORAL
Qty: 30 EACH | Refills: 3 | Status: ON HOLD | OUTPATIENT
Start: 2018-04-03 | End: 2018-04-09 | Stop reason: HOSPADM

## 2018-04-04 DIAGNOSIS — R10.13 EPIGASTRIC ABDOMINAL PAIN: ICD-10-CM

## 2018-04-04 RX ORDER — MORPHINE SULFATE 20 MG/5ML
2.5-5 SOLUTION ORAL EVERY 6 HOURS PRN
Qty: 40 ML | Refills: 0 | Status: SHIPPED | OUTPATIENT
Start: 2018-04-04 | End: 2018-04-10

## 2018-04-05 ENCOUNTER — HOSPITAL ENCOUNTER (OUTPATIENT)
Dept: GENERAL RADIOLOGY | Age: 28
Discharge: HOME | End: 2018-04-05
Payer: COMMERCIAL

## 2018-04-05 VITALS
OXYGEN SATURATION: 99 % | BODY MASS INDEX: 42.95 KG/M2 | HEART RATE: 80 BPM | WEIGHT: 290 LBS | TEMPERATURE: 98.2 F | SYSTOLIC BLOOD PRESSURE: 132 MMHG | DIASTOLIC BLOOD PRESSURE: 69 MMHG | RESPIRATION RATE: 16 BRPM | HEIGHT: 69 IN

## 2018-04-05 DIAGNOSIS — E86.0 DEHYDRATION: ICD-10-CM

## 2018-04-05 PROCEDURE — 96361 HYDRATE IV INFUSION ADD-ON: CPT

## 2018-04-05 PROCEDURE — 2580000003 HC RX 258: Performed by: PHYSICIAN ASSISTANT

## 2018-04-05 PROCEDURE — 96360 HYDRATION IV INFUSION INIT: CPT

## 2018-04-05 RX ORDER — 0.9 % SODIUM CHLORIDE 0.9 %
2000 INTRAVENOUS SOLUTION INTRAVENOUS DAILY
Status: COMPLETED | OUTPATIENT
Start: 2018-04-05 | End: 2018-04-05

## 2018-04-05 RX ORDER — 0.9 % SODIUM CHLORIDE 0.9 %
2000 INTRAVENOUS SOLUTION INTRAVENOUS DAILY
Status: CANCELLED | OUTPATIENT
Start: 2018-04-05

## 2018-04-05 RX ADMIN — SODIUM CHLORIDE 1000 ML: 9 INJECTION, SOLUTION INTRAVENOUS at 13:06

## 2018-04-05 RX ADMIN — SODIUM CHLORIDE 1000 ML: 9 INJECTION, SOLUTION INTRAVENOUS at 12:00

## 2018-04-07 ENCOUNTER — HOSPITAL ENCOUNTER (OUTPATIENT)
Age: 28
End: 2018-04-07
Payer: COMMERCIAL

## 2018-04-07 ENCOUNTER — HOSPITAL ENCOUNTER (OUTPATIENT)
Dept: GENERAL RADIOLOGY | Age: 28
Discharge: HOME | End: 2018-04-07
Payer: COMMERCIAL

## 2018-04-07 VITALS
SYSTOLIC BLOOD PRESSURE: 140 MMHG | BODY MASS INDEX: 42.68 KG/M2 | HEART RATE: 71 BPM | DIASTOLIC BLOOD PRESSURE: 84 MMHG | RESPIRATION RATE: 16 BRPM | TEMPERATURE: 98.4 F | OXYGEN SATURATION: 97 % | WEIGHT: 289 LBS

## 2018-04-07 DIAGNOSIS — E86.0 DEHYDRATION: ICD-10-CM

## 2018-04-07 LAB — MISC. #1 REFERENCE GROUP TEST: NORMAL

## 2018-04-07 PROCEDURE — 96360 HYDRATION IV INFUSION INIT: CPT

## 2018-04-07 PROCEDURE — 2580000003 HC RX 258: Performed by: SURGERY

## 2018-04-07 PROCEDURE — 96361 HYDRATE IV INFUSION ADD-ON: CPT

## 2018-04-07 RX ORDER — 0.9 % SODIUM CHLORIDE 0.9 %
2000 INTRAVENOUS SOLUTION INTRAVENOUS DAILY
Status: CANCELLED | OUTPATIENT
Start: 2018-04-07

## 2018-04-07 RX ORDER — 0.9 % SODIUM CHLORIDE 0.9 %
2000 INTRAVENOUS SOLUTION INTRAVENOUS DAILY
Status: DISCONTINUED | OUTPATIENT
Start: 2018-04-07 | End: 2018-04-08 | Stop reason: HOSPADM

## 2018-04-07 RX ADMIN — SODIUM CHLORIDE 2000 ML: 9 INJECTION, SOLUTION INTRAVENOUS at 12:25

## 2018-04-07 NOTE — PLAN OF CARE
Problem: Intellectual/Education/Knowledge Deficit  Goal: Teaching initiated upon admission  Outcome: Completed Date Met: 04/07/18  Instructed on hydration therapy. States understanding. Goal: Written Disposition Instruction form completed  Outcome: Completed Date Met: 04/07/18  AVS reviewed. Declines copy.

## 2018-04-07 NOTE — PROGRESS NOTES
Respirations regular and easy. Denies pain. Released in satisfactory condition. Ambulates out. Gait steady.

## 2018-04-07 NOTE — PROGRESS NOTES
Pt here for outpatient nursing infusion. Respirations regular and easy. Gait steady. Pt alert and oriented X's 3. Taken to exam 5 for hydration therapy.

## 2018-04-09 ENCOUNTER — ANESTHESIA (OUTPATIENT)
Dept: ENDOSCOPY | Age: 28
End: 2018-04-09
Payer: COMMERCIAL

## 2018-04-09 ENCOUNTER — HOSPITAL ENCOUNTER (OUTPATIENT)
Dept: GENERAL RADIOLOGY | Age: 28
Discharge: HOME | End: 2018-04-09
Payer: COMMERCIAL

## 2018-04-09 ENCOUNTER — ANESTHESIA EVENT (OUTPATIENT)
Dept: ENDOSCOPY | Age: 28
End: 2018-04-09
Payer: COMMERCIAL

## 2018-04-09 ENCOUNTER — HOSPITAL ENCOUNTER (OUTPATIENT)
Age: 28
Setting detail: OUTPATIENT SURGERY
Discharge: HOME | End: 2018-04-09
Attending: INTERNAL MEDICINE | Admitting: INTERNAL MEDICINE
Payer: COMMERCIAL

## 2018-04-09 ENCOUNTER — HOSPITAL ENCOUNTER (OUTPATIENT)
Age: 28
Discharge: HOME | End: 2018-04-09
Payer: COMMERCIAL

## 2018-04-09 VITALS
BODY MASS INDEX: 42.8 KG/M2 | SYSTOLIC BLOOD PRESSURE: 136 MMHG | HEART RATE: 98 BPM | TEMPERATURE: 99.3 F | HEIGHT: 69 IN | WEIGHT: 289 LBS | RESPIRATION RATE: 19 BRPM | DIASTOLIC BLOOD PRESSURE: 96 MMHG | OXYGEN SATURATION: 95 %

## 2018-04-09 VITALS
SYSTOLIC BLOOD PRESSURE: 127 MMHG | OXYGEN SATURATION: 95 % | DIASTOLIC BLOOD PRESSURE: 75 MMHG | RESPIRATION RATE: 22 BRPM

## 2018-04-09 DIAGNOSIS — K21.9 GASTROESOPHAGEAL REFLUX DISEASE, ESOPHAGITIS PRESENCE NOT SPECIFIED: ICD-10-CM

## 2018-04-09 DIAGNOSIS — Z98.84 S/P LAPAROSCOPIC SLEEVE GASTRECTOMY: ICD-10-CM

## 2018-04-09 DIAGNOSIS — E11.8 TYPE 2 DIABETES MELLITUS WITH COMPLICATION, WITHOUT LONG-TERM CURRENT USE OF INSULIN (HCC): ICD-10-CM

## 2018-04-09 DIAGNOSIS — R50.9 FEVER, UNSPECIFIED FEVER CAUSE: ICD-10-CM

## 2018-04-09 DIAGNOSIS — K91.89 LEAK OF ANASTOMOSIS BETWEEN GASTROINTESTINAL STRUCTURES: ICD-10-CM

## 2018-04-09 PROCEDURE — 7100000000 HC PACU RECOVERY - FIRST 15 MIN: Performed by: INTERNAL MEDICINE

## 2018-04-09 PROCEDURE — 3700000000 HC ANESTHESIA ATTENDED CARE: Performed by: INTERNAL MEDICINE

## 2018-04-09 PROCEDURE — 74018 RADEX ABDOMEN 1 VIEW: CPT

## 2018-04-09 PROCEDURE — 2500000003 HC RX 250 WO HCPCS: Performed by: REGISTERED NURSE

## 2018-04-09 PROCEDURE — 7100000001 HC PACU RECOVERY - ADDTL 15 MIN: Performed by: INTERNAL MEDICINE

## 2018-04-09 PROCEDURE — 2580000003 HC RX 258: Performed by: INTERNAL MEDICINE

## 2018-04-09 PROCEDURE — 6360000002 HC RX W HCPCS: Performed by: REGISTERED NURSE

## 2018-04-09 PROCEDURE — 3609012900 HC EGD FOREIGN BODY REMOVAL: Performed by: INTERNAL MEDICINE

## 2018-04-09 PROCEDURE — 2500000003 HC RX 250 WO HCPCS

## 2018-04-09 PROCEDURE — 6360000002 HC RX W HCPCS

## 2018-04-09 PROCEDURE — 3700000001 HC ADD 15 MINUTES (ANESTHESIA): Performed by: INTERNAL MEDICINE

## 2018-04-09 RX ORDER — LIDOCAINE HYDROCHLORIDE 20 MG/ML
INJECTION, SOLUTION INFILTRATION; PERINEURAL PRN
Status: DISCONTINUED | OUTPATIENT
Start: 2018-04-09 | End: 2018-04-09 | Stop reason: SDUPTHER

## 2018-04-09 RX ORDER — PROPOFOL 10 MG/ML
INJECTION, EMULSION INTRAVENOUS PRN
Status: DISCONTINUED | OUTPATIENT
Start: 2018-04-09 | End: 2018-04-09 | Stop reason: SDUPTHER

## 2018-04-09 RX ORDER — ONDANSETRON 2 MG/ML
INJECTION INTRAMUSCULAR; INTRAVENOUS PRN
Status: DISCONTINUED | OUTPATIENT
Start: 2018-04-09 | End: 2018-04-09 | Stop reason: SDUPTHER

## 2018-04-09 RX ORDER — KETAMINE HYDROCHLORIDE 50 MG/ML
INJECTION, SOLUTION, CONCENTRATE INTRAMUSCULAR; INTRAVENOUS PRN
Status: DISCONTINUED | OUTPATIENT
Start: 2018-04-09 | End: 2018-04-09 | Stop reason: SDUPTHER

## 2018-04-09 RX ORDER — SODIUM CHLORIDE 450 MG/100ML
INJECTION, SOLUTION INTRAVENOUS CONTINUOUS
Status: DISCONTINUED | OUTPATIENT
Start: 2018-04-09 | End: 2018-04-09 | Stop reason: HOSPADM

## 2018-04-09 RX ORDER — MIDAZOLAM HYDROCHLORIDE 1 MG/ML
INJECTION INTRAMUSCULAR; INTRAVENOUS
Status: COMPLETED
Start: 2018-04-09 | End: 2018-04-09

## 2018-04-09 RX ORDER — METOCLOPRAMIDE HYDROCHLORIDE 5 MG/ML
INJECTION INTRAMUSCULAR; INTRAVENOUS PRN
Status: DISCONTINUED | OUTPATIENT
Start: 2018-04-09 | End: 2018-04-09 | Stop reason: SDUPTHER

## 2018-04-09 RX ORDER — FENTANYL CITRATE 50 UG/ML
INJECTION, SOLUTION INTRAMUSCULAR; INTRAVENOUS PRN
Status: DISCONTINUED | OUTPATIENT
Start: 2018-04-09 | End: 2018-04-09 | Stop reason: SDUPTHER

## 2018-04-09 RX ORDER — MIDAZOLAM HYDROCHLORIDE 1 MG/ML
INJECTION INTRAMUSCULAR; INTRAVENOUS PRN
Status: DISCONTINUED | OUTPATIENT
Start: 2018-04-09 | End: 2018-04-09 | Stop reason: SDUPTHER

## 2018-04-09 RX ORDER — PANTOPRAZOLE SODIUM 40 MG/1
40 TABLET, DELAYED RELEASE ORAL
Qty: 60 TABLET | Refills: 5 | Status: SHIPPED | OUTPATIENT
Start: 2018-04-09

## 2018-04-09 RX ADMIN — METOCLOPRAMIDE 10 MG: 5 INJECTION, SOLUTION INTRAMUSCULAR; INTRAVENOUS at 12:43

## 2018-04-09 RX ADMIN — MIDAZOLAM HYDROCHLORIDE 2 MG: 1 INJECTION, SOLUTION INTRAMUSCULAR; INTRAVENOUS at 12:47

## 2018-04-09 RX ADMIN — PROPOFOL 50 MG: 10 INJECTION, EMULSION INTRAVENOUS at 13:15

## 2018-04-09 RX ADMIN — PROPOFOL 50 MG: 10 INJECTION, EMULSION INTRAVENOUS at 13:08

## 2018-04-09 RX ADMIN — PROPOFOL 50 MG: 10 INJECTION, EMULSION INTRAVENOUS at 13:01

## 2018-04-09 RX ADMIN — PROPOFOL 50 MG: 10 INJECTION, EMULSION INTRAVENOUS at 12:59

## 2018-04-09 RX ADMIN — PROPOFOL 50 MG: 10 INJECTION, EMULSION INTRAVENOUS at 13:27

## 2018-04-09 RX ADMIN — PROPOFOL 50 MG: 10 INJECTION, EMULSION INTRAVENOUS at 13:06

## 2018-04-09 RX ADMIN — ONDANSETRON 4 MG: 2 INJECTION INTRAMUSCULAR; INTRAVENOUS at 12:43

## 2018-04-09 RX ADMIN — PROPOFOL 50 MG: 10 INJECTION, EMULSION INTRAVENOUS at 13:24

## 2018-04-09 RX ADMIN — PROPOFOL 50 MG: 10 INJECTION, EMULSION INTRAVENOUS at 13:17

## 2018-04-09 RX ADMIN — SODIUM CHLORIDE: 4.5 INJECTION, SOLUTION INTRAVENOUS at 12:02

## 2018-04-09 RX ADMIN — PROPOFOL 50 MG: 10 INJECTION, EMULSION INTRAVENOUS at 13:04

## 2018-04-09 RX ADMIN — PROPOFOL 50 MG: 10 INJECTION, EMULSION INTRAVENOUS at 13:10

## 2018-04-09 RX ADMIN — PROPOFOL 50 MG: 10 INJECTION, EMULSION INTRAVENOUS at 13:21

## 2018-04-09 RX ADMIN — PROPOFOL 50 MG: 10 INJECTION, EMULSION INTRAVENOUS at 13:18

## 2018-04-09 RX ADMIN — PROPOFOL 50 MG: 10 INJECTION, EMULSION INTRAVENOUS at 13:12

## 2018-04-09 RX ADMIN — PROPOFOL 50 MG: 10 INJECTION, EMULSION INTRAVENOUS at 13:30

## 2018-04-09 RX ADMIN — FENTANYL CITRATE 100 MCG: 50 INJECTION INTRAMUSCULAR; INTRAVENOUS at 12:59

## 2018-04-09 RX ADMIN — LIDOCAINE HYDROCHLORIDE 200 MG: 20 INJECTION, SOLUTION INFILTRATION; PERINEURAL at 12:59

## 2018-04-09 RX ADMIN — KETAMINE HYDROCHLORIDE 20 MG: 50 INJECTION, SOLUTION INTRAMUSCULAR; INTRAVENOUS at 12:59

## 2018-04-09 ASSESSMENT — PAIN - FUNCTIONAL ASSESSMENT: PAIN_FUNCTIONAL_ASSESSMENT: 0-10

## 2018-04-10 ENCOUNTER — TELEPHONE (OUTPATIENT)
Dept: BARIATRICS/WEIGHT MGMT | Age: 28
End: 2018-04-10

## 2018-04-10 ENCOUNTER — OFFICE VISIT (OUTPATIENT)
Dept: BARIATRICS/WEIGHT MGMT | Age: 28
End: 2018-04-10

## 2018-04-10 VITALS — BODY MASS INDEX: 42.21 KG/M2 | HEIGHT: 69 IN | WEIGHT: 285 LBS

## 2018-04-10 DIAGNOSIS — Z98.84 STATUS POST BARIATRIC SURGERY: Primary | ICD-10-CM

## 2018-04-10 DIAGNOSIS — Z98.84 S/P LAPAROSCOPIC SLEEVE GASTRECTOMY: Primary | ICD-10-CM

## 2018-04-10 DIAGNOSIS — K91.2 POSTOPERATIVE INTESTINAL MALABSORPTION: ICD-10-CM

## 2018-04-10 DIAGNOSIS — Z98.890 PONV (POSTOPERATIVE NAUSEA AND VOMITING): ICD-10-CM

## 2018-04-10 DIAGNOSIS — Z13.21 MALNUTRITION SCREEN: ICD-10-CM

## 2018-04-10 DIAGNOSIS — R11.2 PONV (POSTOPERATIVE NAUSEA AND VOMITING): ICD-10-CM

## 2018-04-10 DIAGNOSIS — Z98.84 S/P LAPAROSCOPIC SLEEVE GASTRECTOMY: ICD-10-CM

## 2018-04-10 DIAGNOSIS — E66.01 MORBID OBESITY WITH BMI OF 40.0-44.9, ADULT (HCC): ICD-10-CM

## 2018-04-10 RX ORDER — SCOLOPAMINE TRANSDERMAL SYSTEM 1 MG/1
1 PATCH, EXTENDED RELEASE TRANSDERMAL
Qty: 3 PATCH | Refills: 0 | Status: SHIPPED | OUTPATIENT
Start: 2018-04-10 | End: 2018-08-14

## 2018-04-11 ENCOUNTER — TELEPHONE (OUTPATIENT)
Dept: BARIATRICS/WEIGHT MGMT | Age: 28
End: 2018-04-11

## 2018-04-12 ENCOUNTER — TELEPHONE (OUTPATIENT)
Dept: BARIATRICS/WEIGHT MGMT | Age: 28
End: 2018-04-12

## 2018-04-12 DIAGNOSIS — K91.2 POSTOPERATIVE INTESTINAL MALABSORPTION: ICD-10-CM

## 2018-04-12 DIAGNOSIS — E66.01 MORBID OBESITY WITH BMI OF 40.0-44.9, ADULT (HCC): ICD-10-CM

## 2018-04-12 DIAGNOSIS — Z98.84 S/P LAPAROSCOPIC SLEEVE GASTRECTOMY: ICD-10-CM

## 2018-04-12 DIAGNOSIS — R11.2 NAUSEA AND VOMITING, INTRACTABILITY OF VOMITING NOT SPECIFIED, UNSPECIFIED VOMITING TYPE: Primary | ICD-10-CM

## 2018-04-12 DIAGNOSIS — Z13.21 MALNUTRITION SCREEN: ICD-10-CM

## 2018-04-13 ENCOUNTER — HOSPITAL ENCOUNTER (OUTPATIENT)
Dept: GENERAL RADIOLOGY | Age: 28
Discharge: HOME | End: 2018-04-13
Payer: COMMERCIAL

## 2018-04-13 VITALS
DIASTOLIC BLOOD PRESSURE: 96 MMHG | HEIGHT: 70 IN | WEIGHT: 282 LBS | TEMPERATURE: 97.6 F | HEART RATE: 67 BPM | RESPIRATION RATE: 16 BRPM | OXYGEN SATURATION: 100 % | BODY MASS INDEX: 40.37 KG/M2 | SYSTOLIC BLOOD PRESSURE: 138 MMHG

## 2018-04-13 DIAGNOSIS — K91.2 POSTOPERATIVE INTESTINAL MALABSORPTION: ICD-10-CM

## 2018-04-13 DIAGNOSIS — E66.01 MORBID OBESITY WITH BMI OF 40.0-44.9, ADULT (HCC): ICD-10-CM

## 2018-04-13 DIAGNOSIS — E86.0 DEHYDRATION: ICD-10-CM

## 2018-04-13 DIAGNOSIS — Z13.21 MALNUTRITION SCREEN: ICD-10-CM

## 2018-04-13 DIAGNOSIS — Z98.84 S/P LAPAROSCOPIC SLEEVE GASTRECTOMY: ICD-10-CM

## 2018-04-13 LAB
ALBUMIN SERPL-MCNC: 3.5 G/DL (ref 3.5–5.1)
ALP BLD-CCNC: 75 U/L (ref 38–126)
ALT SERPL-CCNC: 22 U/L (ref 11–66)
ANION GAP SERPL CALCULATED.3IONS-SCNC: 18 MEQ/L (ref 8–16)
ANISOCYTOSIS: ABNORMAL
AST SERPL-CCNC: 32 U/L (ref 5–40)
BASOPHILS # BLD: 0.8 %
BASOPHILS ABSOLUTE: 0 THOU/MM3 (ref 0–0.1)
BILIRUB SERPL-MCNC: 0.3 MG/DL (ref 0.3–1.2)
BUN BLDV-MCNC: 7 MG/DL (ref 7–22)
CALCIUM SERPL-MCNC: 9.5 MG/DL (ref 8.5–10.5)
CHLORIDE BLD-SCNC: 99 MEQ/L (ref 98–111)
CO2: 22 MEQ/L (ref 23–33)
CREAT SERPL-MCNC: 0.5 MG/DL (ref 0.4–1.2)
EOSINOPHIL # BLD: 1.7 %
EOSINOPHILS ABSOLUTE: 0.1 THOU/MM3 (ref 0–0.4)
FOLATE: 12.9 NG/ML (ref 4.8–24.2)
GFR SERPL CREATININE-BSD FRML MDRD: > 90 ML/MIN/1.73M2
GLUCOSE BLD-MCNC: 94 MG/DL (ref 70–108)
HCT VFR BLD CALC: 39.5 % (ref 37–47)
HEMOGLOBIN: 12.8 GM/DL (ref 12–16)
LYMPHOCYTES # BLD: 20.9 %
LYMPHOCYTES ABSOLUTE: 1.2 THOU/MM3 (ref 1–4.8)
MAGNESIUM: 1.6 MG/DL (ref 1.6–2.4)
MCH RBC QN AUTO: 29.2 PG (ref 27–31)
MCHC RBC AUTO-ENTMCNC: 32.3 GM/DL (ref 33–37)
MCV RBC AUTO: 90.3 FL (ref 81–99)
MONOCYTES # BLD: 9.9 %
MONOCYTES ABSOLUTE: 0.6 THOU/MM3 (ref 0.4–1.3)
NUCLEATED RED BLOOD CELLS: 0 /100 WBC
PDW BLD-RTO: 15.3 % (ref 11.5–14.5)
PHOSPHORUS: 3.7 MG/DL (ref 2.4–4.7)
PLATELET # BLD: 265 THOU/MM3 (ref 130–400)
PLATELET ESTIMATE: ADEQUATE
PMV BLD AUTO: 11.5 FL (ref 7.4–10.4)
POTASSIUM SERPL-SCNC: 3.7 MEQ/L (ref 3.5–5.2)
PREALBUMIN: 17.9 MG/DL (ref 20–40)
RBC # BLD: 4.38 MILL/MM3 (ref 4.2–5.4)
SCAN OF BLOOD SMEAR: NORMAL
SEG NEUTROPHILS: 66.7 %
SEGMENTED NEUTROPHILS ABSOLUTE COUNT: 3.8 THOU/MM3 (ref 1.8–7.7)
SODIUM BLD-SCNC: 139 MEQ/L (ref 135–145)
TOTAL PROTEIN: 7.4 G/DL (ref 6.1–8)
VITAMIN D 25-HYDROXY: 40 NG/ML (ref 30–100)
WBC # BLD: 5.7 THOU/MM3 (ref 4.8–10.8)

## 2018-04-13 PROCEDURE — 85025 COMPLETE CBC W/AUTO DIFF WBC: CPT

## 2018-04-13 PROCEDURE — 83735 ASSAY OF MAGNESIUM: CPT

## 2018-04-13 PROCEDURE — 82306 VITAMIN D 25 HYDROXY: CPT

## 2018-04-13 PROCEDURE — 82746 ASSAY OF FOLIC ACID SERUM: CPT

## 2018-04-13 PROCEDURE — 2580000003 HC RX 258: Performed by: PHYSICIAN ASSISTANT

## 2018-04-13 PROCEDURE — 84425 ASSAY OF VITAMIN B-1: CPT

## 2018-04-13 PROCEDURE — 84100 ASSAY OF PHOSPHORUS: CPT

## 2018-04-13 PROCEDURE — 84134 ASSAY OF PREALBUMIN: CPT

## 2018-04-13 PROCEDURE — 96361 HYDRATE IV INFUSION ADD-ON: CPT

## 2018-04-13 PROCEDURE — 36415 COLL VENOUS BLD VENIPUNCTURE: CPT

## 2018-04-13 PROCEDURE — 80053 COMPREHEN METABOLIC PANEL: CPT

## 2018-04-13 PROCEDURE — 96360 HYDRATION IV INFUSION INIT: CPT

## 2018-04-13 RX ORDER — 0.9 % SODIUM CHLORIDE 0.9 %
2000 INTRAVENOUS SOLUTION INTRAVENOUS DAILY
Status: DISCONTINUED | OUTPATIENT
Start: 2018-04-13 | End: 2018-04-14 | Stop reason: HOSPADM

## 2018-04-13 RX ORDER — 0.9 % SODIUM CHLORIDE 0.9 %
2000 INTRAVENOUS SOLUTION INTRAVENOUS DAILY
Status: CANCELLED | OUTPATIENT
Start: 2018-04-13

## 2018-04-13 RX ADMIN — SODIUM CHLORIDE 2000 ML: 9 INJECTION, SOLUTION INTRAVENOUS at 14:09

## 2018-04-13 NOTE — FLOWSHEET NOTE
INT removed. Sheath intact. Pt pink warm and dry, breathing with ease. Pt remains alert and oriented. Denies questions or concerns. Pt discharged in stable condition.

## 2018-04-13 NOTE — ED NOTES
Presents to ED for IV fluids per Dr. Elsa Miguel. Pink, warm and dry, lungs clear, C/O Heart Burn pain from ulcer. Patient in pleasant, happy mood after having stent removed.      Mare Stanford LPN  79/63/74 Devin Jacobsen LPN  50/71/66 [id number]

## 2018-04-14 ENCOUNTER — HOSPITAL ENCOUNTER (OUTPATIENT)
Dept: GENERAL RADIOLOGY | Age: 28
Discharge: HOME | End: 2018-04-14
Payer: COMMERCIAL

## 2018-04-14 VITALS
OXYGEN SATURATION: 99 % | HEART RATE: 68 BPM | TEMPERATURE: 97.8 F | RESPIRATION RATE: 18 BRPM | DIASTOLIC BLOOD PRESSURE: 55 MMHG | SYSTOLIC BLOOD PRESSURE: 139 MMHG

## 2018-04-14 PROCEDURE — 2580000003 HC RX 258: Performed by: PHYSICIAN ASSISTANT

## 2018-04-14 PROCEDURE — 96360 HYDRATION IV INFUSION INIT: CPT

## 2018-04-14 PROCEDURE — 96361 HYDRATE IV INFUSION ADD-ON: CPT

## 2018-04-14 RX ORDER — SODIUM CHLORIDE 9 MG/ML
INJECTION, SOLUTION INTRAVENOUS ONCE
Status: COMPLETED | OUTPATIENT
Start: 2018-04-14 | End: 2018-04-14

## 2018-04-14 RX ADMIN — SODIUM CHLORIDE: 9 INJECTION, SOLUTION INTRAVENOUS at 11:12

## 2018-04-14 NOTE — PROGRESS NOTES
Released in stable condition. No questions or concerns noted. Patient remains alert and cooperative.

## 2018-04-14 NOTE — PROGRESS NOTES
Presents for infusion of IV fluids. Alert and cooperative, skin warm and dry. Color normal for ethnicity.

## 2018-04-14 NOTE — PLAN OF CARE
Problem: KNOWLEDGE DEFICIT  Goal: Patient/S.O. demonstrates understanding of disease process, treatment plan, medications, and discharge instructions. Intervention: ASSESS BASELINE KNOWLEDGE  Patient has been on a liquid diet since her surgery. Patient states that she must stick with this until her next doctor appointment.

## 2018-04-17 ENCOUNTER — OFFICE VISIT (OUTPATIENT)
Dept: BARIATRICS/WEIGHT MGMT | Age: 28
End: 2018-04-17

## 2018-04-17 VITALS
SYSTOLIC BLOOD PRESSURE: 104 MMHG | HEIGHT: 69 IN | TEMPERATURE: 97.7 F | DIASTOLIC BLOOD PRESSURE: 80 MMHG | BODY MASS INDEX: 41.77 KG/M2 | WEIGHT: 282 LBS | HEART RATE: 104 BPM

## 2018-04-17 DIAGNOSIS — F32.A DEPRESSION, UNSPECIFIED DEPRESSION TYPE: ICD-10-CM

## 2018-04-17 DIAGNOSIS — K21.9 GASTROESOPHAGEAL REFLUX DISEASE, ESOPHAGITIS PRESENCE NOT SPECIFIED: ICD-10-CM

## 2018-04-17 DIAGNOSIS — E66.01 MORBID OBESITY WITH BMI OF 40.0-44.9, ADULT (HCC): ICD-10-CM

## 2018-04-17 DIAGNOSIS — Z98.84 STATUS POST BARIATRIC SURGERY: Primary | ICD-10-CM

## 2018-04-17 DIAGNOSIS — E03.9 HYPOTHYROIDISM, UNSPECIFIED TYPE: ICD-10-CM

## 2018-04-17 DIAGNOSIS — Z98.84 S/P LAPAROSCOPIC SLEEVE GASTRECTOMY: Primary | ICD-10-CM

## 2018-04-17 PROCEDURE — 99024 POSTOP FOLLOW-UP VISIT: CPT | Performed by: PHYSICIAN ASSISTANT

## 2018-04-22 LAB — VITAMIN B1 WHOLE BLOOD: 75 NMOL/L (ref 70–180)

## 2018-05-09 ENCOUNTER — OFFICE VISIT (OUTPATIENT)
Dept: BARIATRICS/WEIGHT MGMT | Age: 28
End: 2018-05-09

## 2018-05-09 VITALS
HEIGHT: 69 IN | BODY MASS INDEX: 40.73 KG/M2 | HEART RATE: 68 BPM | SYSTOLIC BLOOD PRESSURE: 120 MMHG | TEMPERATURE: 97.9 F | WEIGHT: 275 LBS | DIASTOLIC BLOOD PRESSURE: 82 MMHG

## 2018-05-09 DIAGNOSIS — Z13.21 SCREENING FOR MALNUTRITION: ICD-10-CM

## 2018-05-09 DIAGNOSIS — K91.2 POSTSURGICAL MALABSORPTION: ICD-10-CM

## 2018-05-09 DIAGNOSIS — F32.A DEPRESSION, UNSPECIFIED DEPRESSION TYPE: ICD-10-CM

## 2018-05-09 DIAGNOSIS — Z98.84 S/P LAPAROSCOPIC SLEEVE GASTRECTOMY: Primary | ICD-10-CM

## 2018-05-09 DIAGNOSIS — Z98.84 STATUS POST BARIATRIC SURGERY: Primary | ICD-10-CM

## 2018-05-09 DIAGNOSIS — K21.9 GASTROESOPHAGEAL REFLUX DISEASE, ESOPHAGITIS PRESENCE NOT SPECIFIED: ICD-10-CM

## 2018-05-09 DIAGNOSIS — E66.01 MORBID OBESITY WITH BMI OF 40.0-44.9, ADULT (HCC): ICD-10-CM

## 2018-05-09 DIAGNOSIS — E03.9 HYPOTHYROIDISM, UNSPECIFIED TYPE: ICD-10-CM

## 2018-05-09 PROCEDURE — 99024 POSTOP FOLLOW-UP VISIT: CPT | Performed by: PHYSICIAN ASSISTANT

## 2018-05-24 ENCOUNTER — HOSPITAL ENCOUNTER (OUTPATIENT)
Age: 28
Setting detail: OUTPATIENT SURGERY
Discharge: HOME | End: 2018-05-24
Attending: INTERNAL MEDICINE | Admitting: INTERNAL MEDICINE
Payer: COMMERCIAL

## 2018-05-24 ENCOUNTER — ANESTHESIA EVENT (OUTPATIENT)
Dept: ENDOSCOPY | Age: 28
End: 2018-05-24
Payer: COMMERCIAL

## 2018-05-24 ENCOUNTER — ANESTHESIA (OUTPATIENT)
Dept: ENDOSCOPY | Age: 28
End: 2018-05-24
Payer: COMMERCIAL

## 2018-05-24 VITALS
OXYGEN SATURATION: 100 % | RESPIRATION RATE: 11 BRPM | DIASTOLIC BLOOD PRESSURE: 61 MMHG | SYSTOLIC BLOOD PRESSURE: 130 MMHG

## 2018-05-24 VITALS
BODY MASS INDEX: 39.81 KG/M2 | DIASTOLIC BLOOD PRESSURE: 75 MMHG | HEIGHT: 69 IN | WEIGHT: 268.8 LBS | SYSTOLIC BLOOD PRESSURE: 130 MMHG | OXYGEN SATURATION: 99 % | HEART RATE: 58 BPM | RESPIRATION RATE: 16 BRPM | TEMPERATURE: 97.8 F

## 2018-05-24 PROCEDURE — 2580000003 HC RX 258: Performed by: INTERNAL MEDICINE

## 2018-05-24 PROCEDURE — 3609012400 HC EGD TRANSORAL BIOPSY SINGLE/MULTIPLE: Performed by: INTERNAL MEDICINE

## 2018-05-24 PROCEDURE — 6370000000 HC RX 637 (ALT 250 FOR IP): Performed by: NURSE ANESTHETIST, CERTIFIED REGISTERED

## 2018-05-24 PROCEDURE — 3700000001 HC ADD 15 MINUTES (ANESTHESIA): Performed by: INTERNAL MEDICINE

## 2018-05-24 PROCEDURE — 7100000000 HC PACU RECOVERY - FIRST 15 MIN: Performed by: INTERNAL MEDICINE

## 2018-05-24 PROCEDURE — 88305 TISSUE EXAM BY PATHOLOGIST: CPT

## 2018-05-24 PROCEDURE — 3700000000 HC ANESTHESIA ATTENDED CARE: Performed by: INTERNAL MEDICINE

## 2018-05-24 PROCEDURE — 2500000003 HC RX 250 WO HCPCS: Performed by: NURSE ANESTHETIST, CERTIFIED REGISTERED

## 2018-05-24 PROCEDURE — 6360000002 HC RX W HCPCS: Performed by: NURSE ANESTHETIST, CERTIFIED REGISTERED

## 2018-05-24 RX ORDER — 0.9 % SODIUM CHLORIDE 0.9 %
10 VIAL (ML) INJECTION EVERY 12 HOURS SCHEDULED
Status: DISCONTINUED | OUTPATIENT
Start: 2018-05-24 | End: 2018-05-24 | Stop reason: HOSPADM

## 2018-05-24 RX ORDER — LIDOCAINE HYDROCHLORIDE 20 MG/ML
INJECTION, SOLUTION INFILTRATION; PERINEURAL PRN
Status: DISCONTINUED | OUTPATIENT
Start: 2018-05-24 | End: 2018-05-24 | Stop reason: SDUPTHER

## 2018-05-24 RX ORDER — SODIUM CHLORIDE 450 MG/100ML
INJECTION, SOLUTION INTRAVENOUS CONTINUOUS
Status: DISCONTINUED | OUTPATIENT
Start: 2018-05-24 | End: 2018-05-24 | Stop reason: HOSPADM

## 2018-05-24 RX ORDER — PROPOFOL 10 MG/ML
INJECTION, EMULSION INTRAVENOUS PRN
Status: DISCONTINUED | OUTPATIENT
Start: 2018-05-24 | End: 2018-05-24 | Stop reason: SDUPTHER

## 2018-05-24 RX ORDER — 0.9 % SODIUM CHLORIDE 0.9 %
10 VIAL (ML) INJECTION PRN
Status: DISCONTINUED | OUTPATIENT
Start: 2018-05-24 | End: 2018-05-24 | Stop reason: HOSPADM

## 2018-05-24 RX ADMIN — Medication 1 SPRAY: at 12:07

## 2018-05-24 RX ADMIN — SODIUM CHLORIDE: 4.5 INJECTION, SOLUTION INTRAVENOUS at 11:34

## 2018-05-24 RX ADMIN — LIDOCAINE HYDROCHLORIDE 100 MG: 20 INJECTION, SOLUTION INFILTRATION; PERINEURAL at 12:07

## 2018-05-24 RX ADMIN — PROPOFOL 300 MG: 10 INJECTION, EMULSION INTRAVENOUS at 12:07

## 2018-05-24 ASSESSMENT — PAIN - FUNCTIONAL ASSESSMENT: PAIN_FUNCTIONAL_ASSESSMENT: 0-10

## 2018-05-25 ENCOUNTER — TELEPHONE (OUTPATIENT)
Dept: BARIATRICS/WEIGHT MGMT | Age: 28
End: 2018-05-25

## 2018-08-07 ENCOUNTER — HOSPITAL ENCOUNTER (OUTPATIENT)
Age: 28
Discharge: HOME | End: 2018-08-07
Payer: COMMERCIAL

## 2018-08-07 DIAGNOSIS — K91.2 POSTSURGICAL MALABSORPTION: ICD-10-CM

## 2018-08-07 DIAGNOSIS — Z98.84 S/P LAPAROSCOPIC SLEEVE GASTRECTOMY: ICD-10-CM

## 2018-08-07 DIAGNOSIS — E03.9 HYPOTHYROIDISM, UNSPECIFIED TYPE: ICD-10-CM

## 2018-08-07 DIAGNOSIS — E66.01 MORBID OBESITY WITH BMI OF 40.0-44.9, ADULT (HCC): ICD-10-CM

## 2018-08-07 DIAGNOSIS — Z13.21 SCREENING FOR MALNUTRITION: ICD-10-CM

## 2018-08-07 LAB
ALBUMIN SERPL-MCNC: 3.5 G/DL (ref 3.5–5.1)
ALP BLD-CCNC: 71 U/L (ref 38–126)
ALT SERPL-CCNC: 22 U/L (ref 11–66)
ANION GAP SERPL CALCULATED.3IONS-SCNC: 15 MEQ/L (ref 8–16)
AST SERPL-CCNC: 21 U/L (ref 5–40)
BASOPHILS # BLD: 0.4 %
BASOPHILS ABSOLUTE: 0 THOU/MM3 (ref 0–0.1)
BILIRUB SERPL-MCNC: 0.4 MG/DL (ref 0.3–1.2)
BUN BLDV-MCNC: 8 MG/DL (ref 7–22)
CALCIUM SERPL-MCNC: 9.3 MG/DL (ref 8.5–10.5)
CHLORIDE BLD-SCNC: 104 MEQ/L (ref 98–111)
CO2: 23 MEQ/L (ref 23–33)
CREAT SERPL-MCNC: 0.7 MG/DL (ref 0.4–1.2)
EOSINOPHIL # BLD: 1.8 %
EOSINOPHILS ABSOLUTE: 0.1 THOU/MM3 (ref 0–0.4)
ERYTHROCYTE [DISTWIDTH] IN BLOOD BY AUTOMATED COUNT: 13.1 % (ref 11.5–14.5)
ERYTHROCYTE [DISTWIDTH] IN BLOOD BY AUTOMATED COUNT: 46.3 FL (ref 35–45)
GFR SERPL CREATININE-BSD FRML MDRD: > 90 ML/MIN/1.73M2
GLUCOSE BLD-MCNC: 84 MG/DL (ref 70–108)
HCT VFR BLD CALC: 39.3 % (ref 37–47)
HEMOGLOBIN: 12.9 GM/DL (ref 12–16)
IMMATURE GRANS (ABS): 0.01 THOU/MM3 (ref 0–0.07)
IMMATURE GRANULOCYTES: 0.2 %
LYMPHOCYTES # BLD: 24.4 %
LYMPHOCYTES ABSOLUTE: 1.2 THOU/MM3 (ref 1–4.8)
MCH RBC QN AUTO: 31.7 PG (ref 26–33)
MCHC RBC AUTO-ENTMCNC: 32.8 GM/DL (ref 32.2–35.5)
MCV RBC AUTO: 96.6 FL (ref 81–99)
MONOCYTES # BLD: 9 %
MONOCYTES ABSOLUTE: 0.4 THOU/MM3 (ref 0.4–1.3)
NUCLEATED RED BLOOD CELLS: 0 /100 WBC
PLATELET # BLD: 150 THOU/MM3 (ref 130–400)
PMV BLD AUTO: 13.3 FL (ref 9.4–12.4)
POTASSIUM SERPL-SCNC: 3.9 MEQ/L (ref 3.5–5.2)
RBC # BLD: 4.07 MILL/MM3 (ref 4.2–5.4)
SEG NEUTROPHILS: 64.2 %
SEGMENTED NEUTROPHILS ABSOLUTE COUNT: 3.1 THOU/MM3 (ref 1.8–7.7)
SODIUM BLD-SCNC: 142 MEQ/L (ref 135–145)
TOTAL PROTEIN: 6.7 G/DL (ref 6.1–8)
TSH SERPL DL<=0.05 MIU/L-ACNC: 0.01 UIU/ML (ref 0.4–4.2)
VITAMIN D 25-HYDROXY: 60 NG/ML (ref 30–100)
WBC # BLD: 4.9 THOU/MM3 (ref 4.8–10.8)

## 2018-08-07 PROCEDURE — 82306 VITAMIN D 25 HYDROXY: CPT

## 2018-08-07 PROCEDURE — 84134 ASSAY OF PREALBUMIN: CPT

## 2018-08-07 PROCEDURE — 83540 ASSAY OF IRON: CPT

## 2018-08-07 PROCEDURE — 83550 IRON BINDING TEST: CPT

## 2018-08-07 PROCEDURE — 84425 ASSAY OF VITAMIN B-1: CPT

## 2018-08-07 PROCEDURE — 84439 ASSAY OF FREE THYROXINE: CPT

## 2018-08-07 PROCEDURE — 83970 ASSAY OF PARATHORMONE: CPT

## 2018-08-07 PROCEDURE — 82728 ASSAY OF FERRITIN: CPT

## 2018-08-07 PROCEDURE — 83036 HEMOGLOBIN GLYCOSYLATED A1C: CPT

## 2018-08-07 PROCEDURE — 80053 COMPREHEN METABOLIC PANEL: CPT

## 2018-08-07 PROCEDURE — 85025 COMPLETE CBC W/AUTO DIFF WBC: CPT

## 2018-08-07 PROCEDURE — 36415 COLL VENOUS BLD VENIPUNCTURE: CPT

## 2018-08-07 PROCEDURE — 84443 ASSAY THYROID STIM HORMONE: CPT

## 2018-08-08 LAB
AVERAGE GLUCOSE: 90 MG/DL (ref 70–126)
FERRITIN: 66 NG/ML (ref 10–291)
HBA1C MFR BLD: 5 % (ref 4.4–6.4)
IRON: 43 UG/DL (ref 50–170)
PREALBUMIN: 20.4 MG/DL (ref 20–40)
PTH INTACT: 51.8 PG/ML (ref 15–65)
T4 FREE: 1.78 NG/DL (ref 0.93–1.76)
TOTAL IRON BINDING CAPACITY: 245 UG/DL (ref 171–450)

## 2018-08-14 ENCOUNTER — OFFICE VISIT (OUTPATIENT)
Dept: BARIATRICS/WEIGHT MGMT | Age: 28
End: 2018-08-14

## 2018-08-14 ENCOUNTER — OFFICE VISIT (OUTPATIENT)
Dept: BARIATRICS/WEIGHT MGMT | Age: 28
End: 2018-08-14
Payer: COMMERCIAL

## 2018-08-14 VITALS
DIASTOLIC BLOOD PRESSURE: 70 MMHG | HEART RATE: 76 BPM | WEIGHT: 235 LBS | BODY MASS INDEX: 34.8 KG/M2 | TEMPERATURE: 98.5 F | SYSTOLIC BLOOD PRESSURE: 116 MMHG | HEIGHT: 69 IN

## 2018-08-14 DIAGNOSIS — K21.9 GASTROESOPHAGEAL REFLUX DISEASE, ESOPHAGITIS PRESENCE NOT SPECIFIED: ICD-10-CM

## 2018-08-14 DIAGNOSIS — F32.A DEPRESSION, UNSPECIFIED DEPRESSION TYPE: ICD-10-CM

## 2018-08-14 DIAGNOSIS — Z98.84 STATUS POST LAPAROSCOPIC SLEEVE GASTRECTOMY: Primary | ICD-10-CM

## 2018-08-14 DIAGNOSIS — E03.9 HYPOTHYROIDISM, UNSPECIFIED TYPE: ICD-10-CM

## 2018-08-14 DIAGNOSIS — Z98.84 S/P LAPAROSCOPIC SLEEVE GASTRECTOMY: Primary | ICD-10-CM

## 2018-08-14 DIAGNOSIS — E61.1 LOW SERUM IRON: ICD-10-CM

## 2018-08-14 LAB — VITAMIN B1 WHOLE BLOOD: 101 NMOL/L (ref 70–180)

## 2018-08-14 PROCEDURE — G8417 CALC BMI ABV UP PARAM F/U: HCPCS | Performed by: PHYSICIAN ASSISTANT

## 2018-08-14 PROCEDURE — 1036F TOBACCO NON-USER: CPT | Performed by: PHYSICIAN ASSISTANT

## 2018-08-14 PROCEDURE — G8427 DOCREV CUR MEDS BY ELIG CLIN: HCPCS | Performed by: PHYSICIAN ASSISTANT

## 2018-08-14 PROCEDURE — 99213 OFFICE O/P EST LOW 20 MIN: CPT | Performed by: PHYSICIAN ASSISTANT

## 2018-08-14 RX ORDER — TIZANIDINE 4 MG/1
4 TABLET ORAL NIGHTLY
COMMUNITY

## 2018-08-14 NOTE — PROGRESS NOTES
[facility]  [facility]  [address], [address]  [address] [address]  Dept: [phone]  Loc: [phone]      Visit Date:  8/14/2018  Weight Management Postop Follow-up    HPI:      Ev Juarez is a 25 y.o. female who is here today for 6 month follow up since  robotic-assisted sleeve gastrectomy performed by Dr. Mili Jo  on 7/95/40 complicated by a gastric leak. Doing much better overall. She is very happy with the results from surgery thus far. Notes that she has been feeling more tired recently but feels it is due to working 6 days a week and getting into OT. She has not been getting in any dedicated activity since her hours were increased, per her request, at work. Down 40# since last visit. Down 102# since surgery. BMI 34 . Drinking plenty of fluids. Getting in protein with every meal. Unable to tolerate protein shakes. Has not been able to tolerate red meat or fried foods. No problems with bowel movements. No nausea- she has not been taking any nausea meds. Occasional emesis- with certain foods only. No GERD/ Reflux-continues to take PPI BID. Had repeat EGD with GI and recommended to go back to daily dosing of PPI. Discussed today and plans to cut down to once daily PPI. Denies CP/SOB. No Dizziness. No abdominal pain. No incisional discomfort. No sx of dehydration. Taking and tolerating all vitamins. 6 month labs reviewed- Iron low, TSH low/T4 elevated- medication recently adjusted by PCP. Continues to take Prozac daily with benefit. Current BMI: Body mass index is 34.7 kg/m².   Current Weight:   Wt Readings from Last 3 Encounters:   08/14/18 235 lb (106.6 kg)   05/24/18 268 lb 12.8 oz (121.9 kg)   05/09/18 275 lb (124.7 kg)     Pre-op Body Weight : 337      Past Medical History:  Past Medical History:   Diagnosis Date    Anxiety     Asthma     Chronic back pain     Depression     Diabetes mellitus (HCC)     GERD (gastroesophageal reflux disease)     5    lactulose (CHRONULAC) 10 GM/15ML solution Take 30 mLs by mouth daily as needed (constipation) 900 mL 1    Prenatal Multivit-Min-Fe-FA (PRE-CHANTEL PO) Take 1 tablet by mouth daily      levothyroxine (SYNTHROID) 125 MCG tablet Take 125 mcg by mouth every morning      Norgestimate-Eth Estradiol (SPRINTEC 28 PO) Take by mouth daily       Calcium Carbonate-Vitamin D (OS-ADIN 500 + D PO) Take by mouth 2 times daily      Albuterol Sulfate (PROVENTIL HFA IN) Inhale 2 puffs into the lungs every 4-6 hours as needed       EPINEPHrine HCl, Anaphylaxis, (EPIPEN IM) Inject into the muscle      FLUoxetine (PROZAC) 20 MG/5ML solution Take 5 mLs by mouth daily 150 mL 1     No current facility-administered medications for this visit. Allergies: Allergies   Allergen Reactions    Bee Venom Swelling    Bactrim [Sulfamethoxazole-Trimethoprim] Hives    Mirapex [Pramipexole] Other (See Comments)     Leg cramps    Oxycodone Rash    Requip [Ropinirole] Nausea And Vomiting       Subjective:    Constitutional: Denies any fever, chills, (+)fatigue. Wound: Denies any rash, skin color changes or wound problems. Resp: Denies any cough, shortness of breath. CV: Denies any chest pain, orthopnea or syncope. MS: Denies myalgias, arthralgias. GI: Denies any nausea, (+)vomiting- certain foods only, diarrhea, constipation, melena, hematochezia. No incisional discomfort. : Denies any hematuria, hesitancy or dysuria. NEURO: Denies seizures, headache. Objective:    /70 (Site: Left Arm, Position: Sitting, Cuff Size: Large Adult)   Pulse 76   Temp 98.5 °F (36.9 °C) (Oral)   Ht 5' 9\" (1.753 m)   Wt 235 lb (106.6 kg)   BMI 34.70 kg/m²     Physical Examination:   Constitutional: Alert and oriented to person, place and time. Well-developed, well- nourished. Head: Normocephalic and atraumatic  Neck: Supple. Eyes: EOMI b/l. Conjunctivae normal.  No scleral icterus.   Respiratory: Effort normal. No respiratory distress. Abd: Benign  Ext:  Movement x 4. No edema  Skin; Warm and dry, no visible rashes, lesions or ulcers.    Neuro: Cranial Nerves Grossly Intact; nml coordination        Labs:  CBC   Lab Results   Component Value Date    WBC 4.9 08/07/2018    RBC 4.07 08/07/2018    HGB 12.9 08/07/2018    HCT 39.3 08/07/2018    MCV 96.6 08/07/2018    MCH 31.7 08/07/2018    MCHC 32.8 08/07/2018    RDW 15.3 04/13/2018     08/07/2018    MPV 13.3 08/07/2018    RBCMORP NORMAL 11/03/2016    SEGSPCT 64.2 08/07/2018    LABLYMP 24.4 08/07/2018    MONOPCT 9.0 08/07/2018    LABEOS 1.8 08/07/2018    BASO 0.4 08/07/2018    NRBC 0 08/07/2018    ANISOCYTOSIS 1+ 04/13/2018    SEGSABS 3.1 08/07/2018    LYMPHSABS 1.2 08/07/2018    MONOSABS 0.4 08/07/2018    EOSABS 0.1 08/07/2018    BASOSABS 0.0 08/07/2018        BMP/CMP   Lab Results   Component Value Date    GLUCOSE 84 08/07/2018    CREATININE 0.7 08/07/2018    BUN 8 08/07/2018     08/07/2018    K 3.9 08/07/2018    K 3.7 02/24/2018     08/07/2018    CO2 23 08/07/2018    CALCIUM 9.3 08/07/2018    AST 21 08/07/2018    ALKPHOS 71 08/07/2018    PROT 6.7 08/07/2018    LABALBU 3.5 08/07/2018    BILITOT 0.4 08/07/2018    ALT 22 08/07/2018        PREALBUMIN   Lab Results   Component Value Date    PREALBUMIN 20.4 08/07/2018        VITAMIN B12   Lab Results   Component Value Date    [id number] 296 03/31/2017        24 HOUR URINE CALCIUM   No results found for: URVOLMEAS, [place], CALCIUMUR     VITAMIN D   Lab Results   Component Value Date    VITD25 60 08/07/2018        VITAMIN B1/ THIAMINE   Lab Results   Component Value Date    [id number] 101 08/07/2018        RBC FOLATE   Lab Results   Component Value Date    FOLATE 12.9 04/13/2018        LIPID SCREEN (FASTING)   Lab Results   Component Value Date    CHOL 184 03/31/2017    TRIG 200 03/31/2017    HDL 53 03/31/2017    LDLCALC 91 03/31/2017   ,     HGA1C (T2DM ONLY)   Lab Results   Component Value Date    LABA1C 5.0 08/07/2018    AVGG 90 08/07/2018        TSH   Lab Results   Component Value Date    TSH 0.014 08/07/2018        IRON   Lab Results   Component Value Date    IRON 43 08/07/2018        IONIZED CALCIUM   Lab Results   Component Value Date    CAION 1.18 03/04/2018        Assessment:       Diagnosis Orders   1. S/P laparoscopic sleeve gastrectomy     2. Hypothyroidism, unspecified type     3. Depression, unspecified depression type     4. Gastroesophageal reflux disease, esophagitis presence not specified     5. Low serum iron  CBC Auto Differential    Ferritin    Iron    Iron Binding Capacity     Plan:    Stay well hydrated. Drink a minimum of 64 oz of non-carbonated, non-caffeinated fluids daily. Nutritional education occurred during visit. Tolerating diet. Continue following with dietitian and follow their recommendations as directed. Continue  60-80 grams of protein each day. Signs and symptoms reviewed with patient that would be concerning and need her to return to office for re-evaluation. Patient will call if any questions or concerns arrise. Importance of physical activity discussed with patient. Increase physical activity as tolerated  Add strength training  Continue taking Multivitamin, Calcium and B12 as directed  Continue PPI - try to cut down to once daily dosing as recommended per GI  Encouraged to attend support groups  6 month labs reviewed with patient today- Low Iron-increase Iron as recommended per dietitian today  Repeat Iron studies in 3 months- ordered today  SECA scale completed and reviewed with patient today  Measurements completed and reviewed with patient today  Return in about 3 months (around 11/14/2018) for postop follow up. I spent over 15 minutes with the patient, with greater that 50% of that time spent on face counseling for nutrition and exercise.     Electronically signed by NYDIA Silva on 8/14/2018 at 2:13 PM

## 2018-08-14 NOTE — PROGRESS NOTES
day      Assessment  Pt requires to increase overall water and protein intake without skipping meals to improve energy level in addition low iron. Overall improved tolerance of all food consistencies since three month post op. Is able to recognize foods not tolerated and avoiding them. Plan  Plan/Recommendations: Reviewed recommendations. See instructions below. -  Patient Instructions   1. Continue bariatric vitamins as you are currently taking. Also add iron as your iron level is low. Recommend 36-45 mg every day - can get this over the counter  2. Goal continues to be 60-80 grams protein per day. Focus on choosing protein foods first at meals to remain full and maintain muscle mass while you continue to lose from body fat stores. 3. Regular physical activity is important if desire to continue weight loss efforts. Recommend regular cardiac activity and strength training 2-3 days per week. 4.  Water goal is 64 oz per day and make sure no liquids 30 minutes before meals and no liquids 30 minutes after meals  5.   Take 20-30 minutes to eat meals and chew all foods well for best tolerance  Repeat iron levels in three months (early November)            Recommended Follow-Up: 9 month post op with NYDIA Guzmán RD, LD  Dietitian- [facility]

## 2018-11-01 ENCOUNTER — HOSPITAL ENCOUNTER (OUTPATIENT)
Age: 28
Discharge: HOME | End: 2018-11-01
Payer: COMMERCIAL

## 2018-11-01 DIAGNOSIS — E61.1 LOW SERUM IRON: ICD-10-CM

## 2018-11-01 LAB
BASOPHILS # BLD: 0.4 %
BASOPHILS ABSOLUTE: 0 THOU/MM3 (ref 0–0.1)
EOSINOPHIL # BLD: 1.9 %
EOSINOPHILS ABSOLUTE: 0.1 THOU/MM3 (ref 0–0.4)
ERYTHROCYTE [DISTWIDTH] IN BLOOD BY AUTOMATED COUNT: 12.8 % (ref 11.5–14.5)
ERYTHROCYTE [DISTWIDTH] IN BLOOD BY AUTOMATED COUNT: 44.9 FL (ref 35–45)
HCT VFR BLD CALC: 38.3 % (ref 37–47)
HEMOGLOBIN: 12.8 GM/DL (ref 12–16)
IMMATURE GRANS (ABS): 0.01 THOU/MM3 (ref 0–0.07)
IMMATURE GRANULOCYTES: 0.2 %
LYMPHOCYTES # BLD: 26.3 %
LYMPHOCYTES ABSOLUTE: 1.3 THOU/MM3 (ref 1–4.8)
MCH RBC QN AUTO: 32 PG (ref 26–33)
MCHC RBC AUTO-ENTMCNC: 33.4 GM/DL (ref 32.2–35.5)
MCV RBC AUTO: 95.8 FL (ref 81–99)
MONOCYTES # BLD: 9.1 %
MONOCYTES ABSOLUTE: 0.4 THOU/MM3 (ref 0.4–1.3)
NUCLEATED RED BLOOD CELLS: 0 /100 WBC
PLATELET # BLD: 162 THOU/MM3 (ref 130–400)
PMV BLD AUTO: 13.4 FL (ref 9.4–12.4)
RBC # BLD: 4 MILL/MM3 (ref 4.2–5.4)
SEG NEUTROPHILS: 62.1 %
SEGMENTED NEUTROPHILS ABSOLUTE COUNT: 3 THOU/MM3 (ref 1.8–7.7)
WBC # BLD: 4.9 THOU/MM3 (ref 4.8–10.8)

## 2018-11-01 PROCEDURE — 83540 ASSAY OF IRON: CPT

## 2018-11-01 PROCEDURE — 36415 COLL VENOUS BLD VENIPUNCTURE: CPT

## 2018-11-01 PROCEDURE — 83550 IRON BINDING TEST: CPT

## 2018-11-01 PROCEDURE — 82728 ASSAY OF FERRITIN: CPT

## 2018-11-01 PROCEDURE — 85025 COMPLETE CBC W/AUTO DIFF WBC: CPT

## 2018-11-02 LAB
FERRITIN: 71 NG/ML (ref 10–291)
IRON: 58 UG/DL (ref 50–170)
TOTAL IRON BINDING CAPACITY: 260 UG/DL (ref 171–450)

## 2018-11-04 ENCOUNTER — HOSPITAL ENCOUNTER (EMERGENCY)
Age: 28
Discharge: HOME | End: 2018-11-04
Payer: COMMERCIAL

## 2018-11-04 VITALS
WEIGHT: 212 LBS | DIASTOLIC BLOOD PRESSURE: 94 MMHG | TEMPERATURE: 97.9 F | SYSTOLIC BLOOD PRESSURE: 146 MMHG | OXYGEN SATURATION: 97 % | RESPIRATION RATE: 18 BRPM | HEIGHT: 70 IN | BODY MASS INDEX: 30.35 KG/M2 | HEART RATE: 72 BPM

## 2018-11-04 DIAGNOSIS — B34.9 VIRAL SYNDROME: Primary | ICD-10-CM

## 2018-11-04 DIAGNOSIS — R03.0 ELEVATED BLOOD PRESSURE READING: ICD-10-CM

## 2018-11-04 PROCEDURE — 99283 EMERGENCY DEPT VISIT LOW MDM: CPT

## 2018-11-04 RX ORDER — ONDANSETRON 4 MG/1
4 TABLET, ORALLY DISINTEGRATING ORAL EVERY 8 HOURS PRN
Qty: 10 TABLET | Refills: 0 | Status: SHIPPED | OUTPATIENT
Start: 2018-11-04 | End: 2018-11-06

## 2018-11-04 RX ORDER — FLUOXETINE HYDROCHLORIDE 20 MG/1
40 CAPSULE ORAL DAILY
COMMUNITY

## 2018-11-04 RX ORDER — ONDANSETRON 4 MG/1
4 TABLET, ORALLY DISINTEGRATING ORAL ONCE
Status: DISCONTINUED | OUTPATIENT
Start: 2018-11-04 | End: 2018-11-05 | Stop reason: HOSPADM

## 2018-11-04 ASSESSMENT — ENCOUNTER SYMPTOMS
ABDOMINAL PAIN: 0
COUGH: 0
EYE DISCHARGE: 0
BACK PAIN: 0
SHORTNESS OF BREATH: 0
WHEEZING: 0
RHINORRHEA: 1
SINUS PRESSURE: 0
CHEST TIGHTNESS: 0
EYE REDNESS: 0
VOMITING: 0
SORE THROAT: 1
COLOR CHANGE: 0

## 2018-11-04 ASSESSMENT — PAIN DESCRIPTION - LOCATION: LOCATION: GENERALIZED

## 2018-11-04 ASSESSMENT — PAIN SCALES - GENERAL: PAINLEVEL_OUTOF10: 6

## 2018-11-05 ASSESSMENT — ENCOUNTER SYMPTOMS
NAUSEA: 1
DIARRHEA: 1

## 2018-11-05 NOTE — ED NOTES
Discharge teaching and instructions for condition explained to patient. AVS reviewed. Printed prescriptions given to patient. Patient voiced understanding regarding prescriptions, follow up appointments and care of self at home. Pt discharged to home in stable condition per self.        Antwon Nieves RN  11/04/18 [id number]

## 2018-11-06 ENCOUNTER — OFFICE VISIT (OUTPATIENT)
Dept: BARIATRICS/WEIGHT MGMT | Age: 28
End: 2018-11-06
Payer: COMMERCIAL

## 2018-11-06 VITALS
WEIGHT: 211.2 LBS | TEMPERATURE: 98 F | HEART RATE: 75 BPM | HEIGHT: 69 IN | BODY MASS INDEX: 31.28 KG/M2 | RESPIRATION RATE: 18 BRPM | SYSTOLIC BLOOD PRESSURE: 132 MMHG | DIASTOLIC BLOOD PRESSURE: 80 MMHG

## 2018-11-06 DIAGNOSIS — Z13.21 SCREENING FOR MALNUTRITION: ICD-10-CM

## 2018-11-06 DIAGNOSIS — E66.09 CLASS 1 OBESITY DUE TO EXCESS CALORIES WITH BODY MASS INDEX (BMI) OF 31.0 TO 31.9 IN ADULT, UNSPECIFIED WHETHER SERIOUS COMORBIDITY PRESENT: ICD-10-CM

## 2018-11-06 DIAGNOSIS — F32.A DEPRESSION, UNSPECIFIED DEPRESSION TYPE: ICD-10-CM

## 2018-11-06 DIAGNOSIS — K91.2 POSTSURGICAL MALABSORPTION: ICD-10-CM

## 2018-11-06 DIAGNOSIS — Z98.84 S/P LAPAROSCOPIC SLEEVE GASTRECTOMY: Primary | ICD-10-CM

## 2018-11-06 DIAGNOSIS — E03.9 HYPOTHYROIDISM, UNSPECIFIED TYPE: ICD-10-CM

## 2018-11-06 DIAGNOSIS — K21.9 GASTROESOPHAGEAL REFLUX DISEASE, ESOPHAGITIS PRESENCE NOT SPECIFIED: ICD-10-CM

## 2018-11-06 PROCEDURE — G8417 CALC BMI ABV UP PARAM F/U: HCPCS | Performed by: PHYSICIAN ASSISTANT

## 2018-11-06 PROCEDURE — G8484 FLU IMMUNIZE NO ADMIN: HCPCS | Performed by: PHYSICIAN ASSISTANT

## 2018-11-06 PROCEDURE — 1036F TOBACCO NON-USER: CPT | Performed by: PHYSICIAN ASSISTANT

## 2018-11-06 PROCEDURE — G8427 DOCREV CUR MEDS BY ELIG CLIN: HCPCS | Performed by: PHYSICIAN ASSISTANT

## 2018-11-06 PROCEDURE — 99213 OFFICE O/P EST LOW 20 MIN: CPT | Performed by: PHYSICIAN ASSISTANT

## 2018-11-06 NOTE — PROGRESS NOTES
disease     Ulcer     UTI (urinary tract infection)        Past Surgical History:  Past Surgical History:   Procedure Laterality Date    BREAST SURGERY Right 2016    biopsy    COLONOSCOPY  2015? Dr. Kathryn Padron      gastric sleeve    SD LAP,DIAGNOSTIC ABDOMEN N/A 3/2/2018    ROBOTIC EXPLORATION WITH WASHOUT AND DRAIN PLACEMENT performed by Jerry Phillips MD at [address] LAP,STOMACH,OTHER,W/O TUBE N/A 2/12/2018    GASTRECTOMY SLEEVE LAPAROSCOPIC ROBOTIC performed by Jerry Phillips MD at [address] [facility] OFFICE/OUTPT [facility] N/A 4/9/2018    EGD STENT REMOVAL performed by Jodi Grigsby MD at [address] [facility]  2015?     Dr. Zac Singletary  2017    Dr. Dinorah Gould 3/2/2018    EGD WITH STENT PLACEMENT performed by Jodi Grigsby MD at [address] [facility] N/A 5/24/2018    EGD BIOPSY performed by Jodi Grigsby MD at [address] [facility]       Past Social History:  Social History     Social History    Marital status: Single     Spouse name: N/A    Number of children: N/A    Years of education: 15     Occupational 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[employer]     Social History Main Topics    Smoking status: Former Smoker     Packs/day: 0.50     Types: Cigarettes     Quit date: 5/18/2014    Smokeless tobacco: Never Used    Alcohol use No    Drug use: No    Sexual activity: Yes     Partners: Male     Other Topics Concern    Not on file     Social History Narrative    No narrative on file        Medications:   Current Outpatient Prescriptions   Medication Sig Dispense Refill    FLUoxetine (PROZAC) 20 MG capsule Take 40 mg by mouth daily      tiZANidine (ZANAFLEX) 4 MG tablet Take 4 mg by mouth nightly      pantoprazole (PROTONIX) 40 MG tablet Take 1 tablet by mouth 2 times daily (before meals) 60 tablet 5    lactulose rashes, lesions or ulcers.    Neuro: Cranial Nerves Grossly Intact; nml coordination    Labs:  CBC   Lab Results   Component Value Date    WBC 4.9 11/01/2018    RBC 4.00 11/01/2018    HGB 12.8 11/01/2018    HCT 38.3 11/01/2018    MCV 95.8 11/01/2018    MCH 32.0 11/01/2018    MCHC 33.4 11/01/2018    RDW 15.3 04/13/2018     11/01/2018    MPV 13.4 11/01/2018    RBCMORP NORMAL 11/03/2016    SEGSPCT 62.1 11/01/2018    LABLYMP 26.3 11/01/2018    MONOPCT 9.1 11/01/2018    LABEOS 1.9 11/01/2018    BASO 0.4 11/01/2018    NRBC 0 11/01/2018    ANISOCYTOSIS 1+ 04/13/2018    SEGSABS 3.0 11/01/2018    LYMPHSABS 1.3 11/01/2018    MONOSABS 0.4 11/01/2018    EOSABS 0.1 11/01/2018    BASOSABS 0.0 11/01/2018        BMP/CMP   Lab Results   Component Value Date    GLUCOSE 84 08/07/2018    CREATININE 0.7 08/07/2018    BUN 8 08/07/2018     08/07/2018    K 3.9 08/07/2018    K 3.7 02/24/2018     08/07/2018    CO2 23 08/07/2018    CALCIUM 9.3 08/07/2018    AST 21 08/07/2018    ALKPHOS 71 08/07/2018    PROT 6.7 08/07/2018    LABALBU 3.5 08/07/2018    BILITOT 0.4 08/07/2018    ALT 22 08/07/2018        PREALBUMIN   Lab Results   Component Value Date    PREALBUMIN 20.4 08/07/2018        VITAMIN B12   Lab Results   Component Value Date    [id number] 296 03/31/2017        24 HOUR URINE CALCIUM   No results found for: BRITTANY ButlerUR     VITAMIN D   Lab Results   Component Value Date    VITD25 60 08/07/2018        VITAMIN B1/ THIAMINE   Lab Results   Component Value Date    [id number] 101 08/07/2018        RBC FOLATE   Lab Results   Component Value Date    FOLATE 12.9 04/13/2018        LIPID SCREEN (FASTING)   Lab Results   Component Value Date    CHOL 184 03/31/2017    TRIG 200 03/31/2017    HDL 53 03/31/2017    LDLCALC 91 03/31/2017   ,     HGA1C (T2DM ONLY)   Lab Results   Component Value Date    LABA1C 5.0 08/07/2018    AVGG 90 08/07/2018        TSH   Lab Results   Component Value Date    TSH 0.014 08/07/2018 IRON   Lab Results   Component Value Date    IRON 58 11/01/2018        IONIZED CALCIUM   Lab Results   Component Value Date    CAION 1.18 03/04/2018        Assessment:       Diagnosis Orders   1. S/P laparoscopic sleeve gastrectomy  CBC Auto Differential    Comprehensive Metabolic Panel    Ferritin    Hemoglobin A1C    Iron    Iron Binding Capacity    Lipid Panel    Prealbumin    PTH, Intact    TSH with Reflex    Vitamin B1    Vitamin B12 & Folate    Vitamin D 25 Hydroxy   2. Class 1 obesity due to excess calories with body mass index (BMI) of 31.0 to 31.9 in adult, unspecified whether serious comorbidity present  CBC Auto Differential    Comprehensive Metabolic Panel    Ferritin    Hemoglobin A1C    Iron    Iron Binding Capacity    Lipid Panel    Prealbumin    PTH, Intact    TSH with Reflex    Vitamin B1    Vitamin B12 & Folate    Vitamin D 25 Hydroxy   3. Postsurgical malabsorption  CBC Auto Differential    Comprehensive Metabolic Panel    Ferritin    Hemoglobin A1C    Iron    Iron Binding Capacity    Lipid Panel    Prealbumin    PTH, Intact    TSH with Reflex    Vitamin B1    Vitamin B12 & Folate    Vitamin D 25 Hydroxy   4. Screening for malnutrition  CBC Auto Differential    Comprehensive Metabolic Panel    Ferritin    Hemoglobin A1C    Iron    Iron Binding Capacity    Lipid Panel    Prealbumin    PTH, Intact    TSH with Reflex    Vitamin B1    Vitamin B12 & Folate    Vitamin D 25 Hydroxy   5. Hypothyroidism, unspecified type  TSH with Reflex   6. Depression, unspecified depression type     7. Gastroesophageal reflux disease, esophagitis presence not specified       Plan:    Stay well hydrated. Drink a minimum of 64 oz of non-carbonated, non-caffeinated fluids daily. Nutritional education occurred during visit. Tolerating diet. Continue following with dietitian and follow their recommendations as directed. Continue  60-80 grams of protein each day.     Signs and symptoms reviewed with patient that would be concerning and need her to return to office for re-evaluation. Patient will call if any questions or concerns arrise. Importance of physical activity discussed with patient. Increase physical activity as tolerated  Increase strength training, as tolerated  Continue taking Multivitamin, Calcium and B12 as directed  Encouraged to attend support groups  1 year labs ordered- to be drawn prior to next apt  SECA scale next visit  Measurements next visit  Dietitian follow up at next visit  Advised to wait until 18 months post-op before trying to get pregnant  Return in about 3 months (around 2/6/2019) for postop follow up. I spent over 15 minutes with the patient, with greater that 50% of that time spent on face counseling for nutrition and exercise.     Electronically signed by NYDIA Patel on 11/6/2018 at 1:41 PM

## 2019-01-28 ENCOUNTER — HOSPITAL ENCOUNTER (OUTPATIENT)
Age: 29
Discharge: HOME | End: 2019-01-28
Payer: COMMERCIAL

## 2019-01-28 DIAGNOSIS — K91.2 POSTSURGICAL MALABSORPTION: ICD-10-CM

## 2019-01-28 DIAGNOSIS — Z13.21 SCREENING FOR MALNUTRITION: ICD-10-CM

## 2019-01-28 DIAGNOSIS — E03.9 HYPOTHYROIDISM, UNSPECIFIED TYPE: ICD-10-CM

## 2019-01-28 DIAGNOSIS — Z98.84 S/P LAPAROSCOPIC SLEEVE GASTRECTOMY: ICD-10-CM

## 2019-01-28 DIAGNOSIS — E66.09 CLASS 1 OBESITY DUE TO EXCESS CALORIES WITH BODY MASS INDEX (BMI) OF 31.0 TO 31.9 IN ADULT, UNSPECIFIED WHETHER SERIOUS COMORBIDITY PRESENT: ICD-10-CM

## 2019-01-28 LAB
ALBUMIN SERPL-MCNC: 4 G/DL (ref 3.5–5.1)
ALP BLD-CCNC: 74 U/L (ref 38–126)
ALT SERPL-CCNC: 15 U/L (ref 11–66)
ANION GAP SERPL CALCULATED.3IONS-SCNC: 15 MEQ/L (ref 8–16)
AST SERPL-CCNC: 17 U/L (ref 5–40)
AVERAGE GLUCOSE: 87 MG/DL (ref 70–126)
BASOPHILS # BLD: 0.4 %
BASOPHILS ABSOLUTE: 0 THOU/MM3 (ref 0–0.1)
BILIRUB SERPL-MCNC: 0.3 MG/DL (ref 0.3–1.2)
BUN BLDV-MCNC: 11 MG/DL (ref 7–22)
CALCIUM SERPL-MCNC: 8.9 MG/DL (ref 8.5–10.5)
CHLORIDE BLD-SCNC: 103 MEQ/L (ref 98–111)
CHOLESTEROL, TOTAL: 196 MG/DL (ref 100–199)
CO2: 22 MEQ/L (ref 23–33)
CREAT SERPL-MCNC: 0.7 MG/DL (ref 0.4–1.2)
EOSINOPHIL # BLD: 1.8 %
EOSINOPHILS ABSOLUTE: 0.1 THOU/MM3 (ref 0–0.4)
ERYTHROCYTE [DISTWIDTH] IN BLOOD BY AUTOMATED COUNT: 12 % (ref 11.5–14.5)
ERYTHROCYTE [DISTWIDTH] IN BLOOD BY AUTOMATED COUNT: 44.3 FL (ref 35–45)
FERRITIN: 83 NG/ML (ref 10–291)
FOLATE: > 20 NG/ML (ref 4.8–24.2)
GFR SERPL CREATININE-BSD FRML MDRD: > 90 ML/MIN/1.73M2
GLUCOSE BLD-MCNC: 68 MG/DL (ref 70–108)
HBA1C MFR BLD: 4.9 % (ref 4.4–6.4)
HCT VFR BLD CALC: 39.7 % (ref 37–47)
HDLC SERPL-MCNC: 55 MG/DL
HEMOGLOBIN: 12.8 GM/DL (ref 12–16)
IMMATURE GRANS (ABS): 0.02 THOU/MM3 (ref 0–0.07)
IMMATURE GRANULOCYTES: 0.3 %
IRON: 84 UG/DL (ref 50–170)
LDL CHOLESTEROL CALCULATED: 115 MG/DL
LYMPHOCYTES # BLD: 23.2 %
LYMPHOCYTES ABSOLUTE: 1.7 THOU/MM3 (ref 1–4.8)
MCH RBC QN AUTO: 32.2 PG (ref 26–33)
MCHC RBC AUTO-ENTMCNC: 32.2 GM/DL (ref 32.2–35.5)
MCV RBC AUTO: 99.7 FL (ref 81–99)
MONOCYTES # BLD: 7.7 %
MONOCYTES ABSOLUTE: 0.6 THOU/MM3 (ref 0.4–1.3)
NUCLEATED RED BLOOD CELLS: 0 /100 WBC
PLATELET # BLD: 172 THOU/MM3 (ref 130–400)
PMV BLD AUTO: 13.2 FL (ref 9.4–12.4)
POTASSIUM SERPL-SCNC: 3.8 MEQ/L (ref 3.5–5.2)
PREALBUMIN: 18.1 MG/DL (ref 20–40)
PTH INTACT: 59 PG/ML (ref 15–65)
RBC # BLD: 3.98 MILL/MM3 (ref 4.2–5.4)
SEG NEUTROPHILS: 66.6 %
SEGMENTED NEUTROPHILS ABSOLUTE COUNT: 4.8 THOU/MM3 (ref 1.8–7.7)
SODIUM BLD-SCNC: 140 MEQ/L (ref 135–145)
T4 FREE: 1.53 NG/DL (ref 0.93–1.76)
TOTAL IRON BINDING CAPACITY: 272 UG/DL (ref 171–450)
TOTAL PROTEIN: 6.8 G/DL (ref 6.1–8)
TRIGL SERPL-MCNC: 130 MG/DL (ref 0–199)
TSH SERPL DL<=0.05 MIU/L-ACNC: 0.09 UIU/ML (ref 0.4–4.2)
VITAMIN B-12: > 2000 PG/ML (ref 211–911)
VITAMIN D 25-HYDROXY: 45 NG/ML (ref 30–100)
WBC # BLD: 7.2 THOU/MM3 (ref 4.8–10.8)

## 2019-01-28 PROCEDURE — 83540 ASSAY OF IRON: CPT

## 2019-01-28 PROCEDURE — 83550 IRON BINDING TEST: CPT

## 2019-01-28 PROCEDURE — 82607 VITAMIN B-12: CPT

## 2019-01-28 PROCEDURE — 84425 ASSAY OF VITAMIN B-1: CPT

## 2019-01-28 PROCEDURE — 84439 ASSAY OF FREE THYROXINE: CPT

## 2019-01-28 PROCEDURE — 85025 COMPLETE CBC W/AUTO DIFF WBC: CPT

## 2019-01-28 PROCEDURE — 83036 HEMOGLOBIN GLYCOSYLATED A1C: CPT

## 2019-01-28 PROCEDURE — 80053 COMPREHEN METABOLIC PANEL: CPT

## 2019-01-28 PROCEDURE — 80061 LIPID PANEL: CPT

## 2019-01-28 PROCEDURE — 36415 COLL VENOUS BLD VENIPUNCTURE: CPT

## 2019-01-28 PROCEDURE — 84443 ASSAY THYROID STIM HORMONE: CPT

## 2019-01-28 PROCEDURE — 83970 ASSAY OF PARATHORMONE: CPT

## 2019-01-28 PROCEDURE — 82306 VITAMIN D 25 HYDROXY: CPT

## 2019-01-28 PROCEDURE — 82728 ASSAY OF FERRITIN: CPT

## 2019-01-28 PROCEDURE — 84134 ASSAY OF PREALBUMIN: CPT

## 2019-01-28 PROCEDURE — 82746 ASSAY OF FOLIC ACID SERUM: CPT

## 2019-02-02 LAB — VITAMIN B1 WHOLE BLOOD: 160 NMOL/L (ref 70–180)

## 2019-02-06 ENCOUNTER — OFFICE VISIT (OUTPATIENT)
Dept: BARIATRICS/WEIGHT MGMT | Age: 29
End: 2019-02-06
Payer: COMMERCIAL

## 2019-02-06 ENCOUNTER — OFFICE VISIT (OUTPATIENT)
Dept: BARIATRICS/WEIGHT MGMT | Age: 29
End: 2019-02-06

## 2019-02-06 VITALS
BODY MASS INDEX: 31.7 KG/M2 | WEIGHT: 214 LBS | SYSTOLIC BLOOD PRESSURE: 110 MMHG | HEART RATE: 68 BPM | DIASTOLIC BLOOD PRESSURE: 72 MMHG | TEMPERATURE: 99 F | HEIGHT: 69 IN

## 2019-02-06 DIAGNOSIS — E66.09 CLASS 1 OBESITY DUE TO EXCESS CALORIES WITH BODY MASS INDEX (BMI) OF 31.0 TO 31.9 IN ADULT, UNSPECIFIED WHETHER SERIOUS COMORBIDITY PRESENT: ICD-10-CM

## 2019-02-06 DIAGNOSIS — Z98.84 STATUS POST LAPAROSCOPIC SLEEVE GASTRECTOMY: ICD-10-CM

## 2019-02-06 DIAGNOSIS — R11.0 NAUSEA: Primary | ICD-10-CM

## 2019-02-06 DIAGNOSIS — F32.A DEPRESSION, UNSPECIFIED DEPRESSION TYPE: ICD-10-CM

## 2019-02-06 DIAGNOSIS — E03.9 HYPOTHYROIDISM, UNSPECIFIED TYPE: ICD-10-CM

## 2019-02-06 DIAGNOSIS — Z98.84 S/P LAPAROSCOPIC SLEEVE GASTRECTOMY: Primary | ICD-10-CM

## 2019-02-06 DIAGNOSIS — K21.9 GASTROESOPHAGEAL REFLUX DISEASE, ESOPHAGITIS PRESENCE NOT SPECIFIED: ICD-10-CM

## 2019-02-06 PROCEDURE — G8417 CALC BMI ABV UP PARAM F/U: HCPCS | Performed by: PHYSICIAN ASSISTANT

## 2019-02-06 PROCEDURE — 99214 OFFICE O/P EST MOD 30 MIN: CPT | Performed by: PHYSICIAN ASSISTANT

## 2019-02-06 PROCEDURE — G8484 FLU IMMUNIZE NO ADMIN: HCPCS | Performed by: PHYSICIAN ASSISTANT

## 2019-02-06 PROCEDURE — 1036F TOBACCO NON-USER: CPT | Performed by: PHYSICIAN ASSISTANT

## 2019-02-06 PROCEDURE — G8427 DOCREV CUR MEDS BY ELIG CLIN: HCPCS | Performed by: PHYSICIAN ASSISTANT

## 2019-02-06 RX ORDER — RANITIDINE 150 MG/1
150 TABLET ORAL NIGHTLY
Qty: 30 TABLET | Refills: 3 | Status: SHIPPED | OUTPATIENT
Start: 2019-02-06

## 2019-02-07 ENCOUNTER — HOSPITAL ENCOUNTER (OUTPATIENT)
Dept: ULTRASOUND IMAGING | Age: 29
Discharge: HOME | End: 2019-02-07
Payer: COMMERCIAL

## 2019-02-07 ENCOUNTER — HOSPITAL ENCOUNTER (OUTPATIENT)
Age: 29
End: 2019-02-07
Payer: COMMERCIAL

## 2019-02-07 DIAGNOSIS — R10.31 RIGHT INGUINAL PAIN: ICD-10-CM

## 2019-02-07 PROCEDURE — 76882 US LMTD JT/FCL EVL NVASC XTR: CPT

## (undated) DEVICE — IV START KIT: Brand: MEDLINE INDUSTRIES, INC.

## (undated) DEVICE — PACK,UNIVERSAL,NO GOWNS: Brand: MEDLINE

## (undated) DEVICE — INSUFFLATION NEEDLE TO ESTABLISH PNEUMOPERITONEUM.: Brand: INSUFFLATION NEEDLE

## (undated) DEVICE — CANISTER, RIGID, 2000CC: Brand: MEDLINE INDUSTRIES, INC.

## (undated) DEVICE — TROCAR: Brand: KII FIOS FIRST ENTRY

## (undated) DEVICE — DRAIN PENROSE L12IN DIA1/2IN USED TO PROMOTE DRNAGE IN OPN

## (undated) DEVICE — DIAPER INCONT N PUL ON LG AD HVY ABSORBENCY UNISX LF

## (undated) DEVICE — GOWN,SIRUS,NON REINFRCD,LARGE,SET IN SL: Brand: MEDLINE

## (undated) DEVICE — RELOAD STPLR REINF 60 MM X THCK W/ TRISTAPLE TECHNOLOGY BLK

## (undated) DEVICE — VISIGI 3D®  CALIBRATION SYSTEM  SIZE 36FR STD W/ BULB: Brand: BOEHRINGER® VISIGI 3D™ SLEEVE GASTRECTOMY CALIBRATION SYSTEM, SIZE 36FR W/BULB

## (undated) DEVICE — OBTURATOR ROBOTIC DIA8MM BLDELSS ENDOSCP DISP DA VINCI SI

## (undated) DEVICE — FORCEP RAD JAW W/NEEDLE 160CM

## (undated) DEVICE — CATHETER ETER IV 22GA L1IN POLYUR STR RADPQ INTROCAN SFTY

## (undated) DEVICE — APPLICATOR SURG XL L38CM FOR ARISTA ABSRB HEMSTAT FLEXITIP

## (undated) DEVICE — BINDER ABD 4-PANEL 12INCH 63-74INCH L N ST

## (undated) DEVICE — Device

## (undated) DEVICE — SET LNR RED GRN W/ BASE CLEANASCOPE

## (undated) DEVICE — UNDERPAD HOSP W30XL36IN WHT SUP ABSRB POLYMER AIR PERM DISP

## (undated) DEVICE — GARMENT COMPR STD FOR 17IN CALF UNIF THER FLOTRN

## (undated) DEVICE — REINFORCED RELOAD WITH TRI-STAPLE TECHNOLOGY: Brand: ENDO GIA

## (undated) DEVICE — CONMED SCOPE SAVER BITE BLOCK, 20X27 MM: Brand: SCOPE SAVER

## (undated) DEVICE — [HIGH FLOW INSUFFLATOR,  DO NOT USE IF PACKAGE IS DAMAGED,  KEEP DRY,  KEEP AWAY FROM SUNLIGHT,  PROTECT FROM HEAT AND RADIOACTIVE SOURCES.]: Brand: PNEUMOSURE

## (undated) DEVICE — DRAIN CHN 19FR L0.25IN DIA6.3MM SIL RND HUBLESS FULL FLUT

## (undated) DEVICE — STRIP,CLOSURE,WOUND,MEDI-STRIP,1/2X4: Brand: MEDLINE

## (undated) DEVICE — TROCAR: Brand: KII® SLEEVE

## (undated) DEVICE — ELECTRO LUBE IS A SINGLE PATIENT USE DEVICE THAT IS INTENDED TO BE USED ON ELECTROSURGICAL ELECTRODES TO REDUCE STICKING.: Brand: KEY SURGICAL ELECTRO LUBE

## (undated) DEVICE — VESSEL SEALER: Brand: ENDOWRIST

## (undated) DEVICE — GAUZE,SPONGE,8"X4",12PLY,XRAY,STRL,LF: Brand: MEDLINE

## (undated) DEVICE — CORE TRUMPET FOR SINGLE SOLUTION BAG: Brand: CORE DYNAMICS

## (undated) DEVICE — PACK PROCEDURE SURG SET UP SRMC

## (undated) DEVICE — GUIDEWIRE SURG L260CM DIA0.035IN ANG TRIM DISP JAGWIRE

## (undated) DEVICE — COLUMN DRAPE

## (undated) DEVICE — TETRA-FLEX CF WOVEN LATEX FREE ELASTIC BANDAGE 6" X 5.5 YD: Brand: TETRA-FLEX™CF

## (undated) DEVICE — BANDAGE ADH W1XL3IN NAT FAB WVN FLX DURABLE N ADH PD SEAL

## (undated) DEVICE — BASIC SINGLE BASIN BTC-LF: Brand: MEDLINE INDUSTRIES, INC.

## (undated) DEVICE — HYPODERMIC SAFETY NEEDLE: Brand: MAGELLAN

## (undated) DEVICE — GLOVE SURG SZ 65 THK91MIL LTX FREE SYN POLYISOPRENE

## (undated) DEVICE — 3M™ STERI-STRIP™ COMPOUND BENZOIN TINCTURE 40 BAGS/CARTON 4 CARTONS/CASE C1544: Brand: 3M™ STERI-STRIP™

## (undated) DEVICE — SOLUTION ANTIFOG VIS SYS CLEARIFY LAPSCP

## (undated) DEVICE — SOLUTION IV 1000ML 0.9% SOD CHL PH 5 INJ USP VIAFLX PLAS

## (undated) DEVICE — 35 ML SYRINGE LUER-LOCK TIP: Brand: MONOJECT

## (undated) DEVICE — ENDO KIT: Brand: MEDLINE INDUSTRIES, INC.

## (undated) DEVICE — APPLICATOR PREP 26ML 0.7% IOD POVACRYLEX 74% ISO ALC ST

## (undated) DEVICE — SET ADMIN 25ML L117IN PMP MOD CK VLV RLER CLMP 2 SMRTSITE

## (undated) DEVICE — TUBING IV STOPCOCK 48 CM 3 W

## (undated) DEVICE — CANNULA SEAL

## (undated) DEVICE — SOLUTION IV 1000ML 0.45% SOD CHL PH 5 INJ USP VIAFLX PLAS

## (undated) DEVICE — INTENDED FOR TISSUE SEPARATION, AND OTHER PROCEDURES THAT REQUIRE A SHARP SURGICAL BLADE TO PUNCTURE OR CUT.: Brand: BARD-PARKER ® CARBON RIB-BACK BLADES

## (undated) DEVICE — BLADELESS OBTURATOR: Brand: WECK VISTA

## (undated) DEVICE — AGENT HEMSTAT 3GM PURIFIED PLNT STARCH PWD ABSRB ARISTA AH

## (undated) DEVICE — SEALANT HEMSTAT 5ML HUM FIBRIN THROM 2 VI APPL DEV EVICEL

## (undated) DEVICE — SUTURE VCRL SZ 0 L18IN ABSRB VLT SUTUPAK PRECUT W/O NDL J106T

## (undated) DEVICE — 3M™ WARMING BLANKET, UPPER BODY, 10 PER CASE, 42268: Brand: BAIR HUGGER™

## (undated) DEVICE — SUTURE VCRL SZ 4-0 L27IN ABSRB UD L19MM FS-2 3/8 CIR REV J422H

## (undated) DEVICE — BANDAGE ADH W2XL4IN NITRL FAB STRP CURAD

## (undated) DEVICE — GOWN,SIRUS,NONRNF,SETINSLV,XL,20/CS: Brand: MEDLINE

## (undated) DEVICE — TUBING, SUCTION, 1/4" X 20', STRAIGHT: Brand: MEDLINE INDUSTRIES, INC.

## (undated) DEVICE — TROCARS: Brand: KII® OPTICAL ACCESS SYSTEM

## (undated) DEVICE — COVER ARMBRD W13XL28.5IN IMPERV BLU FOR OP RM

## (undated) DEVICE — ARM DRAPE

## (undated) DEVICE — TIP COVER ACCESSORY

## (undated) DEVICE — GLOVE ORANGE PI 7   MSG9070